# Patient Record
Sex: FEMALE | Race: WHITE | NOT HISPANIC OR LATINO | Employment: UNEMPLOYED | URBAN - METROPOLITAN AREA
[De-identification: names, ages, dates, MRNs, and addresses within clinical notes are randomized per-mention and may not be internally consistent; named-entity substitution may affect disease eponyms.]

---

## 2017-02-10 ENCOUNTER — NON-PROVIDER VISIT (OUTPATIENT)
Dept: OCCUPATIONAL MEDICINE | Facility: CLINIC | Age: 47
End: 2017-02-10

## 2017-02-10 DIAGNOSIS — Z02.1 PRE-EMPLOYMENT DRUG SCREENING: ICD-10-CM

## 2017-02-10 LAB
AMP AMPHETAMINE: NORMAL
COC COCAINE: NORMAL
INT CON NEG: NORMAL
INT CON POS: NORMAL
MET METHAMPHETAMINES: NORMAL
OPI OPIATES: NORMAL
PCP PHENCYCLIDINE: NORMAL
POC DRUG COMMENT 753798-OCCUPATIONAL HEALTH: NORMAL
THC: NORMAL

## 2017-02-10 PROCEDURE — 80305 DRUG TEST PRSMV DIR OPT OBS: CPT | Performed by: PREVENTIVE MEDICINE

## 2017-02-12 ENCOUNTER — HOSPITAL ENCOUNTER (EMERGENCY)
Facility: MEDICAL CENTER | Age: 47
End: 2017-02-12
Attending: EMERGENCY MEDICINE
Payer: COMMERCIAL

## 2017-02-12 VITALS
HEIGHT: 64 IN | SYSTOLIC BLOOD PRESSURE: 131 MMHG | WEIGHT: 159.61 LBS | DIASTOLIC BLOOD PRESSURE: 99 MMHG | BODY MASS INDEX: 27.25 KG/M2 | RESPIRATION RATE: 17 BRPM | HEART RATE: 88 BPM | OXYGEN SATURATION: 96 % | TEMPERATURE: 96.4 F

## 2017-02-12 DIAGNOSIS — K08.89 PAIN, DENTAL: ICD-10-CM

## 2017-02-12 PROCEDURE — A9270 NON-COVERED ITEM OR SERVICE: HCPCS | Performed by: EMERGENCY MEDICINE

## 2017-02-12 PROCEDURE — 700102 HCHG RX REV CODE 250 W/ 637 OVERRIDE(OP): Performed by: EMERGENCY MEDICINE

## 2017-02-12 PROCEDURE — 99283 EMERGENCY DEPT VISIT LOW MDM: CPT

## 2017-02-12 RX ORDER — IBUPROFEN 600 MG/1
600 TABLET ORAL ONCE
Status: COMPLETED | OUTPATIENT
Start: 2017-02-12 | End: 2017-02-12

## 2017-02-12 RX ORDER — TRAMADOL HYDROCHLORIDE 50 MG/1
50 TABLET ORAL EVERY 6 HOURS PRN
Qty: 10 TAB | Refills: 0 | Status: ON HOLD | OUTPATIENT
Start: 2017-02-12 | End: 2017-04-06

## 2017-02-12 RX ORDER — PENICILLIN V POTASSIUM 500 MG/1
500 TABLET ORAL 4 TIMES DAILY
Qty: 40 TAB | Refills: 0 | Status: SHIPPED | OUTPATIENT
Start: 2017-02-12 | End: 2020-05-03

## 2017-02-12 RX ADMIN — IBUPROFEN 600 MG: 600 TABLET, FILM COATED ORAL at 12:18

## 2017-02-12 ASSESSMENT — LIFESTYLE VARIABLES: DO YOU DRINK ALCOHOL: NO

## 2017-02-12 ASSESSMENT — PAIN SCALES - GENERAL: PAINLEVEL_OUTOF10: 10

## 2017-02-12 NOTE — ED AVS SNAPSHOT
ClrTouch Access Code: [email or web address]  Expires: 3/12/2017 12:41 PM    ClrTouch  A secure, online tool to manage your health information     [employer]’s ClrTouch® is a secure, online tool that connects you to your personalized health information from the privacy of your home -- day or night - making it very easy for you to manage your healthcare. Once the activation process is completed, you can even access your medical information using the ClrTouch radha, which is available for free in the Apple Radha store or Google Play store.     ClrTouch provides the following levels of access (as shown below):   My Chart Features   [facility] Primary Care Doctor [facility]  Specialists [facility]  Urgent  Care Non-[facility]  Primary Care  Doctor   Email your healthcare team securely and privately 24/7 X X X X   Manage appointments: schedule your next appointment; view details of past/upcoming appointments X      Request prescription refills. X      View recent personal medical records, including lab and immunizations X X X X   View health record, including health history, allergies, medications X X X X   Read reports about your outpatient visits, procedures, consult and ER notes X X X X   See your discharge summary, which is a recap of your hospital and/or ER visit that includes your diagnosis, lab results, and care plan. X X       How to register for ClrTouch:  1. Go to  [email or web address].  2. Click on the Sign Up Now box, which takes you to the New Member Sign Up page. You will need to provide the following information:  a. Enter your ClrTouch Access Code exactly as it appears at the top of this page. (You will not need to use this code after you’ve completed the sign-up process. If you do not sign up before the expiration date, you must request a new code.)   b. Enter your date of birth.   c. Enter your home email address.   d. Click Submit, and follow the next screen’s instructions.  3. Create a ClrTouch ID. This will be your ClrTouch  login ID and cannot be changed, so think of one that is secure and easy to remember.  4. Create a Ret[employer] password. You can change your password at any time.  5. Enter your Password Reset Question and Answer. This can be used at a later time if you forget your password.   6. Enter your e-mail address. This allows you to receive e-mail notifications when new information is available in RetentionGrid.  7. Click Sign Up. You can now view your health information.    For assistance activating your RetentionGrid account, call [phone]

## 2017-02-12 NOTE — ED AVS SNAPSHOT
Home Care Instructions                                                                                                                Kaela Rhodes   MRN: [id number]    Department:  [facility], Emergency Dept   Date of Visit:  2/12/2017            [facility], Emergency Dept    [address]    [place] [address]    Phone:  [phone]      You were seen by     Alex Adrian D.O.      Your Diagnosis Was     Pain, dental     K08.89       These are the medications you received during your hospitalization from 02/12/2017 1057 to 02/12/2017 1221     Date/Time Order Dose Route Action    02/12/2017 1218 ibuprofen (MOTRIN) tablet 600 mg 600 mg Oral Given      Follow-up Information     1. Follow up with [facility].    Contact information    [facility]  [place] [id number] [phone]        Medication Information     Review all of your home medications and newly ordered medications with your primary doctor and/or pharmacist as soon as possible. Follow medication instructions as directed by your doctor and/or pharmacist.     Please keep your complete medication list with you and share with your physician. Update the information when medications are discontinued, doses are changed, or new medications (including over-the-counter products) are added; and carry medication information at all times in the event of emergency situations.               Medication List      START taking these medications        Instructions    penicillin v potassium 500 MG Tabs   Commonly known as:  VEETID    Take 1 Tab by mouth 4 times a day.   Dose:  500 mg         ASK your doctor about these medications        Instructions    dicyclomine 20 MG Tabs   Commonly known as:  BENTYL    Take 1 Tab by mouth every 6 hours.   Dose:  20 mg       * ibuprofen 200 MG Tabs   Commonly known as:  MOTRIN    Take 800 mg by mouth 2 Times a Day.   Dose:  800 mg       * ibuprofen 600 MG Tabs   Commonly known  as:  MOTRIN    Take 1 Tab by mouth every 6 hours as needed.   Dose:  600 mg       * ibuprofen 600 MG Tabs   Commonly known as:  MOTRIN    Take 1 Tab by mouth 3 times a day, with meals.   Dose:  600 mg       omeprazole 20 MG delayed-release capsule   Commonly known as:  PRILOSEC    Take 1 Cap by mouth every day.   Dose:  20 mg       ondansetron 4 MG Tbdp   Commonly known as:  ZOFRAN ODT    Take 1 Tab by mouth every 8 hours as needed for Nausea/Vomiting.   Dose:  4 mg       * tramadol 50 MG Tabs   What changed:  Another medication with the same name was added. Make sure you understand how and when to take each.   Commonly known as:  ULTRAM   Ask about: Which instructions should I use?    Take 1 Tab by mouth every 6 hours as needed for Moderate Pain.   Dose:  50 mg       * tramadol 50 MG Tabs   What changed:  You were already taking a medication with the same name, and this prescription was added. Make sure you understand how and when to take each.   Commonly known as:  ULTRAM   Ask about: Which instructions should I use?    Take 1 Tab by mouth every 6 hours as needed.   Dose:  50 mg       * Notice:  This list has 5 medication(s) that are the same as other medications prescribed for you. Read the directions carefully, and ask your doctor or other care provider to review them with you.            Patient Information     Patient Information    Following emergency treatment: all patient requiring follow-up care must return either to a private physician or a clinic if your condition worsens before you are able to obtain further medical attention, please return to the emergency room.     Billing Information    At [facility], we work to make the billing process streamlined for our patients.  Our Representatives are here to answer any questions you may have regarding your hospital bill.  If you have insurance coverage and have supplied your insurance information to us, we will submit a claim to your insurer on your behalf.   Should you have any questions regarding your bill, we can be reached online or by phone as follows:  Online: You are able pay your bills online or live chat with our representatives about any billing questions you may have. We are here to help Monday - Friday from 8:00am to 7:30pm and 9:00am - 12:00pm on Saturdays.  Please visit [email or web address]  for more information.   Phone:  [phone] or [phone]    Please note that your emergency physician, surgeon, pathologist, radiologist, anesthesiologist, and other specialists are not employed by [facility] and will therefore bill separately for their services.  Please contact them directly for any questions concerning their bills at the numbers below:     Emergency Physician Services:  [phone]  [facility]:  [phone]  [facility]:  [phone]  [facility]:  [phone]    1. Your final bill may vary from the amount quoted upon discharge if all procedures are not complete at that time, or if your doctor has additional procedures of which we are not aware. You will receive an additional bill if you return to the Emergency Department at [facility] for suture removal regardless of the facility of which the sutures were placed.     2. Please arrange for settlement of this account at the emergency registration.    3. All self-pay accounts are due in full at the time of treatment.  If you are unable to meet this obligation then payment is expected within 4-5 days.     4. If you have had radiology studies (CT, X-ray, Ultrasound, MRI), you have received a preliminary result during your emergency department visit. Please contact the radiology department [phone] to receive a copy of your final result. Please discuss the Final result with your primary physician or with the follow up physician provided.     Crisis Hotline:  National Crisis Hotline:  3-859-GEAKNKL or  1-782.540.4880  [place] Crisis Hotline:    1-125.157.5621 or 928-301-8257         ED Discharge Follow Up Questions    1. In order to provide you with very good care, we would like to follow up with a phone call in the next few days.  May we have your permission to contact you?     YES /  NO    2. What is the best phone number to call you? (       )_____-__________    3. What is the best time to call you?      Morning  /  Afternoon  /  Evening                   Patient Signature:  ____________________________________________________________    Date:  ____________________________________________________________

## 2017-02-12 NOTE — ED PROVIDER NOTES
"ED Provider Note    CHIEF COMPLAINT  Chief Complaint   Patient presents with   • Oral Swelling   • Dental Pain       HPI  Kaela Rhodes is a 46 y.o. female here for evaluation of dental pain.  The pt states that she noticed some swelling over the lower left jaw, with associated pain.  She is able to eat/drink as per the usual.  No fever, no vomiting.  She states she has a long history of dental issues.     PAST MEDICAL HISTORY   has a past medical history of Hypertension.    SOCIAL HISTORY  Social History     Social History Main Topics   • Smoking status: Current Every Day Smoker -- 1.00 packs/day     Types: Cigarettes   • Smokeless tobacco: Not on file   • Alcohol Use: Yes      Comment: occasional   • Drug Use: No   • Sexual Activity: Not on file       SURGICAL HISTORY   has past surgical history that includes other orthopedic surgery.    CURRENT MEDICATIONS  Home Medications     **Home medications have not yet been reviewed for this encounter**          ALLERGIES  No Known Allergies    REVIEW OF SYSTEMS  See HPI for further details. Review of systems as above, otherwise all other systems are negative.     PHYSICAL EXAM  VITAL SIGNS: /99 mmHg  Pulse 91  Temp(Src) 35.8 °C (96.4 °F)  Resp 17  Ht 1.626 m (5' 4\")  Wt 72.4 kg (159 lb 9.8 oz)  BMI 27.38 kg/m2  SpO2 97%    Constitutional: No distress. Well nourished.  HENT: Head is atraumatic. Oropharynx is moist.  Dental caries noted, no abscess.  No trismus.   Eyes: Conjunctivae are normal. EOMI.   Respiratory: No respiratory distress. Equal chest expansion.   Musculoskeletal: Normal range of motion. No edema.   Neurological: Alert. No focal deficits noted.    Skin: No rash. No Pallor.   Psych: Appropriate for clinical situation. Normal affect.    PROCEDURES     MEDICAL RECORD  I have reviewed patient's medical record and pertinent results are listed above.    COURSE & MEDICAL DECISION MAKING  I have reviewed any medical record information, " laboratory studies and radiographic results as noted above.    Differential diagnoses include but not limited to: dental caries vs abscess    12:17 PM  Pt is nontoxic appearing, comfortable and without trismus.  No abscess.  I will start abx, and pain rx.      narc site checked, no sig rx.     Pt will follow up with pcp for blood pressure control    This patient presents with dental pain.  At this time, I have counseled the patient/family regarding their medications, pain control, and follow up.  They will continue their medications, if any, as prescribed.  They will return immediately for any worsening symptoms and/or any other medical concerns.  They will see their doctor, or contact the doctor provided, in 1-2 days for follow up.       FINAL IMPRESSION  1. Pain, dental            Electronically signed by: Alex Adrian, 2/12/2017 12:15 PM

## 2017-02-12 NOTE — ED AVS SNAPSHOT
2/12/2017          Kaela Rhodes  [address]  [place] [address]    Dear Kaela:    [facility] wants to ensure your discharge home is safe and you or your loved ones have had all your questions answered regarding your care after you leave the hospital.    You may receive a telephone call within two days of your discharge.  This call is to make certain you understand your discharge instructions as well as ensure we provided you with the best care possible during your stay with us.     The call will only last approximately 3-5 minutes and will be done by a nurse.    Once again, we want to ensure your discharge home is safe and that you have a clear understanding of any next steps in your care.  If you have any questions or concerns, please do not hesitate to contact us, we are here for you.  Thank you for choosing [facility] for your healthcare needs.    Sincerely,    Alek Oliva    [facility]

## 2017-03-25 ENCOUNTER — APPOINTMENT (OUTPATIENT)
Dept: RADIOLOGY | Facility: MEDICAL CENTER | Age: 47
DRG: 964 | End: 2017-03-25
Attending: DENTIST
Payer: COMMERCIAL

## 2017-03-25 ENCOUNTER — APPOINTMENT (OUTPATIENT)
Dept: RADIOLOGY | Facility: MEDICAL CENTER | Age: 47
DRG: 964 | End: 2017-03-25
Attending: EMERGENCY MEDICINE
Payer: COMMERCIAL

## 2017-03-25 ENCOUNTER — RESOLUTE PROFESSIONAL BILLING HOSPITAL PROF FEE (OUTPATIENT)
Dept: HOSPITALIST | Facility: MEDICAL CENTER | Age: 47
End: 2017-03-25
Payer: COMMERCIAL

## 2017-03-25 ENCOUNTER — APPOINTMENT (OUTPATIENT)
Dept: RADIOLOGY | Facility: MEDICAL CENTER | Age: 47
DRG: 964 | End: 2017-03-25
Attending: NEUROLOGICAL SURGERY
Payer: COMMERCIAL

## 2017-03-25 ENCOUNTER — APPOINTMENT (OUTPATIENT)
Dept: RADIOLOGY | Facility: MEDICAL CENTER | Age: 47
DRG: 964 | End: 2017-03-25
Attending: SURGERY
Payer: COMMERCIAL

## 2017-03-25 ENCOUNTER — HOSPITAL ENCOUNTER (INPATIENT)
Facility: MEDICAL CENTER | Age: 47
LOS: 12 days | DRG: 964 | End: 2017-04-06
Attending: EMERGENCY MEDICINE | Admitting: SURGERY
Payer: COMMERCIAL

## 2017-03-25 DIAGNOSIS — M25.512 ACUTE PAIN OF LEFT SHOULDER: ICD-10-CM

## 2017-03-25 DIAGNOSIS — S22.42XA CLOSED FRACTURE OF MULTIPLE RIBS OF LEFT SIDE, INITIAL ENCOUNTER: ICD-10-CM

## 2017-03-25 DIAGNOSIS — S36.039A SPLENIC LACERATION, INITIAL ENCOUNTER: ICD-10-CM

## 2017-03-25 PROBLEM — Z79.01 INADEQUATE ANTICOAGULATION: Status: ACTIVE | Noted: 2017-03-25

## 2017-03-25 PROBLEM — S27.0XXA TRAUMATIC PNEUMOTHORAX: Status: ACTIVE | Noted: 2017-03-25

## 2017-03-25 PROBLEM — F10.929 ALCOHOL INTOXICATION (HCC): Status: ACTIVE | Noted: 2017-03-25

## 2017-03-25 PROBLEM — S32.009A CLOSED FRACTURE OF LUMBAR VERTEBRA (HCC): Status: ACTIVE | Noted: 2017-03-25

## 2017-03-25 PROBLEM — S02.92XA CLOSED FRACTURE OF FACIAL BONE DUE TO MOTOR VEHICLE ACCIDENT (HCC): Status: ACTIVE | Noted: 2017-03-25
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
## 2017-03-25 PROBLEM — Z51.81 INADEQUATE ANTICOAGULATION: Status: ACTIVE | Noted: 2017-03-25

## 2017-03-25 LAB
ABO GROUP BLD: NORMAL
ABO GROUP BLD: NORMAL
ALBUMIN SERPL BCP-MCNC: 3.9 G/DL (ref 3.2–4.9)
ALBUMIN/GLOB SERPL: 1.5 G/DL
ALP SERPL-CCNC: 56 U/L (ref 30–99)
ALT SERPL-CCNC: 42 U/L (ref 2–50)
ANION GAP SERPL CALC-SCNC: 8 MMOL/L (ref 0–11.9)
APTT PPP: <20 SEC (ref 24.7–36)
AST SERPL-CCNC: 49 U/L (ref 12–45)
BILIRUB SERPL-MCNC: 0.3 MG/DL (ref 0.1–1.5)
BLD GP AB SCN SERPL QL: NORMAL
BUN SERPL-MCNC: 12 MG/DL (ref 8–22)
CALCIUM SERPL-MCNC: 8.9 MG/DL (ref 8.5–10.5)
CHLORIDE SERPL-SCNC: 106 MMOL/L (ref 96–112)
CO2 SERPL-SCNC: 24 MMOL/L (ref 20–33)
CREAT SERPL-MCNC: 0.7 MG/DL (ref 0.5–1.4)
ERYTHROCYTE [DISTWIDTH] IN BLOOD BY AUTOMATED COUNT: 44 FL (ref 35.9–50)
ETHANOL BLD-MCNC: 0.21 G/DL
GFR SERPL CREATININE-BSD FRML MDRD: >60 ML/MIN/1.73 M 2
GLOBULIN SER CALC-MCNC: 2.6 G/DL (ref 1.9–3.5)
GLUCOSE SERPL-MCNC: 131 MG/DL (ref 65–99)
HCG SERPL QL: NEGATIVE
HCT VFR BLD AUTO: 43.1 % (ref 37–47)
HGB BLD-MCNC: 13 G/DL (ref 12–16)
HGB BLD-MCNC: 14 G/DL (ref 12–16)
HGB BLD-MCNC: 14.2 G/DL (ref 12–16)
HGB BLD-MCNC: 14.3 G/DL (ref 12–16)
HGB BLD-MCNC: 15.4 G/DL (ref 12–16)
INR PPP: 0.93 (ref 0.87–1.13)
MCH RBC QN AUTO: 31 PG (ref 27–33)
MCHC RBC AUTO-ENTMCNC: 32.9 G/DL (ref 33.6–35)
MCV RBC AUTO: 94.1 FL (ref 81.4–97.8)
PLATELET # BLD AUTO: 247 K/UL (ref 164–446)
PMV BLD AUTO: 9.9 FL (ref 9–12.9)
POTASSIUM SERPL-SCNC: 3.6 MMOL/L (ref 3.6–5.5)
PROT SERPL-MCNC: 6.5 G/DL (ref 6–8.2)
PROTHROMBIN TIME: 12.8 SEC (ref 12–14.6)
RBC # BLD AUTO: 4.58 M/UL (ref 4.2–5.4)
RH BLD: NORMAL
SODIUM SERPL-SCNC: 138 MMOL/L (ref 135–145)
WBC # BLD AUTO: 13.3 K/UL (ref 4.8–10.8)

## 2017-03-25 PROCEDURE — 85610 PROTHROMBIN TIME: CPT

## 2017-03-25 PROCEDURE — 94668 MNPJ CHEST WALL SBSQ: CPT

## 2017-03-25 PROCEDURE — 80307 DRUG TEST PRSMV CHEM ANLYZR: CPT

## 2017-03-25 PROCEDURE — 70450 CT HEAD/BRAIN W/O DYE: CPT

## 2017-03-25 PROCEDURE — 85018 HEMOGLOBIN: CPT

## 2017-03-25 PROCEDURE — 80053 COMPREHEN METABOLIC PANEL: CPT

## 2017-03-25 PROCEDURE — 86850 RBC ANTIBODY SCREEN: CPT

## 2017-03-25 PROCEDURE — 96375 TX/PRO/DX INJ NEW DRUG ADDON: CPT

## 2017-03-25 PROCEDURE — 99291 CRITICAL CARE FIRST HOUR: CPT | Performed by: SURGERY

## 2017-03-25 PROCEDURE — 96376 TX/PRO/DX INJ SAME DRUG ADON: CPT

## 2017-03-25 PROCEDURE — 71010 DX-CHEST-PORTABLE (1 VIEW): CPT

## 2017-03-25 PROCEDURE — 96374 THER/PROPH/DIAG INJ IV PUSH: CPT

## 2017-03-25 PROCEDURE — 86901 BLOOD TYPING SEROLOGIC RH(D): CPT

## 2017-03-25 PROCEDURE — 85730 THROMBOPLASTIN TIME PARTIAL: CPT

## 2017-03-25 PROCEDURE — 770022 HCHG ROOM/CARE - ICU (200)

## 2017-03-25 PROCEDURE — 700111 HCHG RX REV CODE 636 W/ 250 OVERRIDE (IP)

## 2017-03-25 PROCEDURE — 99291 CRITICAL CARE FIRST HOUR: CPT

## 2017-03-25 PROCEDURE — 94667 MNPJ CHEST WALL 1ST: CPT

## 2017-03-25 PROCEDURE — 72125 CT NECK SPINE W/O DYE: CPT

## 2017-03-25 PROCEDURE — 86900 BLOOD TYPING SEROLOGIC ABO: CPT

## 2017-03-25 PROCEDURE — 72146 MRI CHEST SPINE W/O DYE: CPT

## 2017-03-25 PROCEDURE — 71260 CT THORAX DX C+: CPT

## 2017-03-25 PROCEDURE — 700101 HCHG RX REV CODE 250: Performed by: SURGERY

## 2017-03-25 PROCEDURE — 72131 CT LUMBAR SPINE W/O DYE: CPT

## 2017-03-25 PROCEDURE — 51798 US URINE CAPACITY MEASURE: CPT

## 2017-03-25 PROCEDURE — 84703 CHORIONIC GONADOTROPIN ASSAY: CPT

## 2017-03-25 PROCEDURE — 85027 COMPLETE CBC AUTOMATED: CPT

## 2017-03-25 PROCEDURE — 70486 CT MAXILLOFACIAL W/O DYE: CPT

## 2017-03-25 PROCEDURE — 700111 HCHG RX REV CODE 636 W/ 250 OVERRIDE (IP): Performed by: SURGERY

## 2017-03-25 PROCEDURE — 700105 HCHG RX REV CODE 258: Performed by: SURGERY

## 2017-03-25 PROCEDURE — 72128 CT CHEST SPINE W/O DYE: CPT

## 2017-03-25 PROCEDURE — G0390 TRAUMA RESPONS W/HOSP CRITI: HCPCS

## 2017-03-25 RX ORDER — FAMOTIDINE 20 MG/1
20 TABLET, FILM COATED ORAL 2 TIMES DAILY
Status: DISCONTINUED | OUTPATIENT
Start: 2017-03-25 | End: 2017-04-06 | Stop reason: HOSPADM

## 2017-03-25 RX ORDER — CHLORHEXIDINE GLUCONATE ORAL RINSE 1.2 MG/ML
15 SOLUTION DENTAL EVERY 12 HOURS
Status: DISCONTINUED | OUTPATIENT
Start: 2017-03-25 | End: 2017-03-25

## 2017-03-25 RX ORDER — ONDANSETRON 2 MG/ML
4 INJECTION INTRAMUSCULAR; INTRAVENOUS EVERY 4 HOURS PRN
Status: DISCONTINUED | OUTPATIENT
Start: 2017-03-25 | End: 2017-04-06 | Stop reason: HOSPADM

## 2017-03-25 RX ORDER — MORPHINE SULFATE 4 MG/ML
2-4 INJECTION, SOLUTION INTRAMUSCULAR; INTRAVENOUS
Status: DISCONTINUED | OUTPATIENT
Start: 2017-03-25 | End: 2017-03-31

## 2017-03-25 RX ORDER — THIAMINE MONONITRATE (VIT B1) 100 MG
100 TABLET ORAL DAILY
Status: DISCONTINUED | OUTPATIENT
Start: 2017-03-26 | End: 2017-03-29

## 2017-03-25 RX ORDER — ENEMA 19; 7 G/133ML; G/133ML
1 ENEMA RECTAL
Status: DISCONTINUED | OUTPATIENT
Start: 2017-03-25 | End: 2017-04-06 | Stop reason: HOSPADM

## 2017-03-25 RX ORDER — LORAZEPAM 2 MG/ML
3-4 INJECTION INTRAMUSCULAR
Status: DISCONTINUED | OUTPATIENT
Start: 2017-03-25 | End: 2017-03-31

## 2017-03-25 RX ORDER — DOCUSATE SODIUM 100 MG/1
100 CAPSULE, LIQUID FILLED ORAL 2 TIMES DAILY
Status: DISCONTINUED | OUTPATIENT
Start: 2017-03-25 | End: 2017-04-06 | Stop reason: HOSPADM

## 2017-03-25 RX ORDER — BISACODYL 10 MG
10 SUPPOSITORY, RECTAL RECTAL
Status: DISCONTINUED | OUTPATIENT
Start: 2017-03-25 | End: 2017-04-06 | Stop reason: HOSPADM

## 2017-03-25 RX ORDER — LORAZEPAM 2 MG/ML
1-2 INJECTION INTRAMUSCULAR
Status: DISCONTINUED | OUTPATIENT
Start: 2017-03-25 | End: 2017-03-31

## 2017-03-25 RX ORDER — FOLIC ACID 1 MG/1
1 TABLET ORAL DAILY
Status: DISCONTINUED | OUTPATIENT
Start: 2017-03-26 | End: 2017-03-29

## 2017-03-25 RX ORDER — SODIUM CHLORIDE, SODIUM LACTATE, POTASSIUM CHLORIDE, CALCIUM CHLORIDE 600; 310; 30; 20 MG/100ML; MG/100ML; MG/100ML; MG/100ML
INJECTION, SOLUTION INTRAVENOUS CONTINUOUS
Status: DISCONTINUED | OUTPATIENT
Start: 2017-03-25 | End: 2017-03-28

## 2017-03-25 RX ORDER — AMOXICILLIN 250 MG
1 CAPSULE ORAL NIGHTLY
Status: DISCONTINUED | OUTPATIENT
Start: 2017-03-25 | End: 2017-04-06 | Stop reason: HOSPADM

## 2017-03-25 RX ORDER — AMOXICILLIN 250 MG
1 CAPSULE ORAL
Status: DISCONTINUED | OUTPATIENT
Start: 2017-03-25 | End: 2017-04-06 | Stop reason: HOSPADM

## 2017-03-25 RX ORDER — POLYETHYLENE GLYCOL 3350 17 G/17G
1 POWDER, FOR SOLUTION ORAL 2 TIMES DAILY
Status: DISCONTINUED | OUTPATIENT
Start: 2017-03-25 | End: 2017-04-06 | Stop reason: HOSPADM

## 2017-03-25 RX ADMIN — MORPHINE SULFATE 4 MG: 4 INJECTION INTRAVENOUS at 12:00

## 2017-03-25 RX ADMIN — MORPHINE SULFATE 4 MG: 4 INJECTION INTRAVENOUS at 04:12

## 2017-03-25 RX ADMIN — FOLIC ACID: 5 INJECTION, SOLUTION INTRAMUSCULAR; INTRAVENOUS; SUBCUTANEOUS at 10:00

## 2017-03-25 RX ADMIN — LORAZEPAM 2 MG: 2 INJECTION INTRAMUSCULAR at 13:04

## 2017-03-25 RX ADMIN — HYDROMORPHONE HYDROCHLORIDE 0.5 MG: 1 INJECTION, SOLUTION INTRAMUSCULAR; INTRAVENOUS; SUBCUTANEOUS at 03:32

## 2017-03-25 RX ADMIN — FENTANYL CITRATE 100 MCG: 50 INJECTION, SOLUTION INTRAMUSCULAR; INTRAVENOUS at 02:03

## 2017-03-25 RX ADMIN — HYDROMORPHONE HYDROCHLORIDE 0.5 MG: 1 INJECTION, SOLUTION INTRAMUSCULAR; INTRAVENOUS; SUBCUTANEOUS at 02:35

## 2017-03-25 RX ADMIN — Medication 0.5 MG: at 02:35

## 2017-03-25 RX ADMIN — MORPHINE SULFATE 4 MG: 4 INJECTION INTRAVENOUS at 07:45

## 2017-03-25 RX ADMIN — LORAZEPAM 4 MG: 2 INJECTION INTRAMUSCULAR at 17:00

## 2017-03-25 RX ADMIN — MORPHINE SULFATE 4 MG: 4 INJECTION INTRAVENOUS at 15:20

## 2017-03-25 RX ADMIN — MORPHINE SULFATE 4 MG: 4 INJECTION INTRAVENOUS at 13:04

## 2017-03-25 RX ADMIN — MORPHINE SULFATE 4 MG: 4 INJECTION INTRAVENOUS at 09:56

## 2017-03-25 RX ADMIN — LORAZEPAM 2 MG: 2 INJECTION INTRAMUSCULAR at 08:28

## 2017-03-25 RX ADMIN — Medication 0.5 MG: at 03:32

## 2017-03-25 RX ADMIN — SODIUM CHLORIDE, POTASSIUM CHLORIDE, SODIUM LACTATE AND CALCIUM CHLORIDE: 600; 310; 30; 20 INJECTION, SOLUTION INTRAVENOUS at 04:16

## 2017-03-25 RX ADMIN — LORAZEPAM 2 MG: 2 INJECTION INTRAMUSCULAR at 21:20

## 2017-03-25 RX ADMIN — FAMOTIDINE 20 MG: 10 INJECTION INTRAVENOUS at 21:15

## 2017-03-25 RX ADMIN — MORPHINE SULFATE 4 MG: 4 INJECTION INTRAVENOUS at 17:00

## 2017-03-25 RX ADMIN — LORAZEPAM 2 MG: 2 INJECTION INTRAMUSCULAR at 04:30

## 2017-03-25 ASSESSMENT — PAIN SCALES - GENERAL
PAINLEVEL_OUTOF10: 5
PAINLEVEL_OUTOF10: 10
PAINLEVEL_OUTOF10: 8
PAINLEVEL_OUTOF10: 6
PAINLEVEL_OUTOF10: 6
PAINLEVEL_OUTOF10: 10
PAINLEVEL_OUTOF10: 9
PAINLEVEL_OUTOF10: 10
PAINLEVEL_OUTOF10: 6
PAINLEVEL_OUTOF10: 10
PAINLEVEL_OUTOF10: 6
PAINLEVEL_OUTOF10: 10
PAINLEVEL_OUTOF10: 5
PAINLEVEL_OUTOF10: 4
PAINLEVEL_OUTOF10: 7

## 2017-03-25 ASSESSMENT — LIFESTYLE VARIABLES
HAVE YOU EVER FELT YOU SHOULD CUT DOWN ON YOUR DRINKING: NO
TOTAL SCORE: 0
ON A TYPICAL DAY WHEN YOU DRINK ALCOHOL HOW MANY DRINKS DO YOU HAVE: 0
DO YOU DRINK ALCOHOL: YES
EVER FELT BAD OR GUILTY ABOUT YOUR DRINKING: NO
EVER_SMOKED: YES
TOTAL SCORE: 0
HOW MANY TIMES IN THE PAST YEAR HAVE YOU HAD 5 OR MORE DRINKS IN A DAY: 5
TOTAL SCORE: 0
AVERAGE NUMBER OF DAYS PER WEEK YOU HAVE A DRINK CONTAINING ALCOHOL: 2
EVER HAD A DRINK FIRST THING IN THE MORNING TO STEADY YOUR NERVES TO GET RID OF A HANGOVER: NO
HAVE PEOPLE ANNOYED YOU BY CRITICIZING YOUR DRINKING: NO
CONSUMPTION TOTAL: POSITIVE

## 2017-03-25 ASSESSMENT — COPD QUESTIONNAIRES
DURING THE PAST 4 WEEKS HOW MUCH DID YOU FEEL SHORT OF BREATH: SOME OF THE TIME
COPD SCREENING SCORE: 4
DO YOU EVER COUGH UP ANY MUCUS OR PHLEGM?: NO/ONLY WITH OCCASIONAL COLDS OR INFECTIONS
HAVE YOU SMOKED AT LEAST 100 CIGARETTES IN YOUR ENTIRE LIFE: YES
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
## 2017-03-25 NOTE — ED NOTES
Pt return from CT. Pt screaming/crying in pain. ERP made aware new orders received. PT resp shallow, painful per pt. Pt c/o L shoulder pain, positive swelling and deformity.

## 2017-03-25 NOTE — PROGRESS NOTES
Pt arrived to unit with RN and CCT.  Pt crying out through out transfer.  Arrived in unit approx 0405.  Morphine given for pain.  Pt does not tolerate being moved.  Blood assessed on L side of head.  Pt has piercing in genital area and tongue.  C/o pain in back, L side and L ankle.
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
## 2017-03-25 NOTE — IP AVS SNAPSHOT
4/6/2017          Kaela Rhodes  [address]  [place] [address]    Dear Kaela:    [facility] wants to ensure your discharge home is safe and you or your loved ones have had all your questions answered regarding your care after you leave the hospital.    You may receive a telephone call within two days of your discharge.  This call is to make certain you understand your discharge instructions as well as ensure we provided you with the best care possible during your stay with us.     The call will only last approximately 3-5 minutes and will be done by a nurse.    Once again, we want to ensure your discharge home is safe and that you have a clear understanding of any next steps in your care.  If you have any questions or concerns, please do not hesitate to contact us, we are here for you.  Thank you for choosing [facility] for your healthcare needs.    Sincerely,    Alek Oliva    [facility]
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
## 2017-03-25 NOTE — IP AVS SNAPSHOT
Wynlink Access Code: [email or web address]  Expires: 5/6/2017  4:18 PM    Wynlink  A secure, online tool to manage your health information     [employer]’s Wynlink® is a secure, online tool that connects you to your personalized health information from the privacy of your home -- day or night - making it very easy for you to manage your healthcare. Once the activation process is completed, you can even access your medical information using the Wynlink radha, which is available for free in the Apple Radha store or Google Play store.     Wynlink provides the following levels of access (as shown below):   My Chart Features   [facility] Primary Care Doctor [facility]  Specialists [facility]  Urgent  Care Non-[facility]  Primary Care  Doctor   Email your healthcare team securely and privately 24/7 X X X X   Manage appointments: schedule your next appointment; view details of past/upcoming appointments X      Request prescription refills. X      View recent personal medical records, including lab and immunizations X X X X   View health record, including health history, allergies, medications X X X X   Read reports about your outpatient visits, procedures, consult and ER notes X X X X   See your discharge summary, which is a recap of your hospital and/or ER visit that includes your diagnosis, lab results, and care plan. X X       How to register for Wynlink:  1. Go to  [email or web address].  2. Click on the Sign Up Now box, which takes you to the New Member Sign Up page. You will need to provide the following information:  a. Enter your Wynlink Access Code exactly as it appears at the top of this page. (You will not need to use this code after you’ve completed the sign-up process. If you do not sign up before the expiration date, you must request a new code.)   b. Enter your date of birth.   c. Enter your home email address.   d. Click Submit, and follow the next screen’s instructions.  3. Create a Wynlink ID. This will be your Wynlink  login ID and cannot be changed, so think of one that is secure and easy to remember.  4. Create a [employer] password. You can change your password at any time.  5. Enter your Password Reset Question and Answer. This can be used at a later time if you forget your password.   6. Enter your e-mail address. This allows you to receive e-mail notifications when new information is available in MeshApp.  7. Click Sign Up. You can now view your health information.    For assistance activating your MeshApp account, call [phone]

## 2017-03-25 NOTE — IP AVS SNAPSHOT
" <p align=\"LEFT\"><IMG SRC=\"//[email or web address]\" alt=\"Image\" WIDTH=\"50%\" HEIGHT=\"200\" BORDER=\"\"></p>                   Name:Kaela Rhodes  Medical Record Number:[id number]  CSN: [id number]    YOB: 1970   Age: 46 y.o.  Sex: female  HT:1.626 m (5' 4\") WT: 74 kg (163 lb 2.3 oz)          Admit Date: 3/25/2017     Discharge Date:   Today's Date: 4/6/2017  Attending Doctor:  Jolly Hernandez M.D.                  Allergies:  Review of patient's allergies indicates no known allergies.          Follow-up Information     1. Follow up with Rodolfo Virk M.D.. Go on 4/10/2017.    Specialty:  Family Medicine    Why:  PLEASE ARRIVE AT 8:30AM FOR A 9:00AM APPOINTMENT. BRING PHOTO ID, 2 MONTHS OF PAY STUBS, PROOF OF ADDRESS AND ALL MEDICATIONS. THANK YOU    Contact information    [address]  [address]  [place] [address]  [phone]          2. Follow up with Damián Felipe M.D.. Schedule an appointment as soon as possible for a visit on 5/20/2017.    Specialty:  Neurosurgery    Why:  follow-up MRI for spinal injury    Contact information    [address]  [place] [address]  [phone]          3. Follow up with Jolly Hernandez M.D.. Schedule an appointment as soon as possible for a visit in 1 week.    Specialty:  Surgery    Contact information    [address]  [address]  [place] [address] [phone]          4. Schedule an appointment as soon as possible for a visit with Blayne Wilhelm D.M.D.,MDOTTIE.    Specialty:  Oral Surgery    Why:  for facial fracture re-evaluation, As needed    Contact information    [address]  [place] [address]  [phone]          5. Follow up with Haim Hernandez M.D.. Schedule an appointment as soon as possible for a visit in 2 weeks.    Specialty:  Ophthalmology    Why:  for follow-up eye appointment    Contact information    [address] [place] [address] [id number] [phone]           Medication List      Take these Medications        Instructions   " dicyclomine 20 MG Tabs   Commonly known as:  BENTYL    Take 1 Tab by mouth every 6 hours.   Dose:  20 mg        MG Caps    Doctor's comments:  While on narcotic pain meds.   Take 100 mg by mouth 2 times a day.   Dose:  100 mg       omeprazole 20 MG delayed-release capsule   Commonly known as:  PRILOSEC    Take 1 Cap by mouth every day.   Dose:  20 mg       ondansetron 4 MG Tbdp   Commonly known as:  ZOFRAN ODT    Take 1 Tab by mouth every 8 hours as needed for Nausea/Vomiting.   Dose:  4 mg       oxycodone immediate release 10 MG immediate release tablet   Commonly known as:  ROXICODONE    Take 0.5-1 Tabs by mouth every 3 hours as needed for Moderate Pain ((4-6)).   Dose:  5-10 mg       penicillin v potassium 500 MG Tabs   Commonly known as:  VEETID    Take 1 Tab by mouth 4 times a day.   Dose:  500 mg

## 2017-03-25 NOTE — ED NOTES
Pt back from ct scan with co pain to lt shoulder and lt rib cage, deformity noted to lt shoulder, pt vss during ct scan, resp are even and mildly labored due to pain with breathing, resp rate stayed between 16-24/min during ct scan sao2 stayed between 96-99% on oxygen at 2l/min via nc, heart rate 80-95/min, pt screams out with any movement to body, splinting left shoulder and lt rib cage during ct, primary nurse at bedside

## 2017-03-25 NOTE — PROGRESS NOTES
Reviewed scan.     Left zygoma fracture. Minimally displaced. Not much depression. More of a cosmetic deformity possible.   I will discuss with patient when she is sober.  Best to also allow swelling to go down to make a decision.  At this   Time I don't see surgery as likely.
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
## 2017-03-25 NOTE — CARE PLAN
Problem: Oxygenation:  Goal: Maintain adequate oxygenation dependent on patient condition  Outcome: PROGRESSING AS EXPECTED    Problem: Hyperinflation:  Goal: Prevent or improve atelectasis  Outcome: PROGRESSING AS EXPECTED  PT IS is 750
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
## 2017-03-25 NOTE — IP AVS SNAPSHOT
" Home Care Instructions                                                                                                                  Name:Kaela Rhodes  Medical Record Number:[id number]  CSN: [id number]    YOB: 1970   Age: 46 y.o.  Sex: female  HT:1.626 m (5' 4\") WT: 74 kg (163 lb 2.3 oz)          Admit Date: 3/25/2017     Discharge Date:   Today's Date: 4/6/2017  Attending Doctor:  Jolly Hernandez M.D.                  Allergies:  Review of patient's allergies indicates no known allergies.            Discharge Instructions       Discharge Instructions    Discharged to home by car with relative. Discharged via walking, hospital escort: Refused.  Special equipment needed: Walker    Be sure to schedule a follow-up appointment with your primary care doctor or any specialists as instructed.     Discharge Plan:   Diet Plan: Discussed  Activity Level: Discussed  Smoking Cessation Offered: Patient Refused  Confirmed Follow up Appointment: Patient to Call and Schedule Appointment  Confirmed Symptoms Management: Discussed  Medication Reconciliation Updated: Yes  Influenza Vaccine Indication: Patient Refuses    I understand that a diet low in cholesterol, fat, and sodium is recommended for good health. Unless I have been given specific instructions below for another diet, I accept this instruction as my diet prescription.   Other diet: regular    Special Instructions: Call or seek medical attention for questions or concerns   - Follow up with Dr. Hernandez in 1 weeks time   - Follow up with Dr. Wilhelm in 1 weeks time for facial fracture follow-up   - Follow up with Dr. Hernandez in 2 weeks for opthalmology follow-up   - Follow up with Dr. Felipe on 5/20 for repeat MRI of spine   - Follow up with primary care provider within one weeks time   - Resume regular diet   - Continue daily over the counter stool softener while on narcotics   - No operation of machinery or motorized vehicles while under the influence of " narcotics   - No alcohol use while under the influence of narcotics   - No swimming, hot tubs, baths or wound submersion until cleared by outpatient provider. May shower   - No contact sports, strenuous activities, or heavy lifting until cleared by outpatient provider   - If respiratory decompensation, changes in neurologic status, changes in vision, uncontrolled pain, or signs or symptoms of infection occur seek medical attention    · Is patient discharged on Warfarin / Coumadin?   No     · Is patient Post Blood Transfusion?  No    Depression / Suicide Risk    As you are discharged from this [facility] facility, it is important to learn how to keep safe from harming yourself.    Recognize the warning signs:  · Abrupt changes in personality, positive or negative- including increase in energy   · Giving away possessions  · Change in eating patterns- significant weight changes-  positive or negative  · Change in sleeping patterns- unable to sleep or sleeping all the time   · Unwillingness or inability to communicate  · Depression  · Unusual sadness, discouragement and loneliness  · Talk of wanting to die  · Neglect of personal appearance   · Rebelliousness- reckless behavior  · Withdrawal from people/activities they love  · Confusion- inability to concentrate     If you or a loved one observes any of these behaviors or has concerns about self-harm, here's what you can do:  · Talk about it- your feelings and reasons for harming yourself  · Remove any means that you might use to hurt yourself (examples: pills, rope, extension cords, firearm)  · Get professional help from the community (Mental Health, Substance Abuse, psychological counseling)  · Do not be alone:Call your Safe Contact- someone whom you trust who will be there for you.  · Call your local CRISIS HOTLINE 241-8352 or 419-126-0435  · Call your local Children's Mobile Crisis Response Team [place] (452) 843-9336 or www.Convrrt  · Call the toll  free National Suicide Prevention Hotlines   · National Suicide Prevention Lifeline 533-307-YPAQ (7732)  · [facility] 800-SUICIDE (985-3522)    Fracture Care, Generic  The 206 bones in our body are important for supporting our body (skeleton) and also for production of blood cells by the bone marrow. A fracture is a break in a tissue. A tissue is a collection of cells that performs a function or job in our body. We most commonly think of fractures in bones.  When a bone is broken, or fractured, it affects not only blood production and function. There may also be other damage when structures near the bones are injured.  There are three main types of fractures:  · Open - where there is a wound leading to the fracture site or the bone is protruding from the skin.   · Closed - where the bone has fractured but has no external wound.   · Complicated - this may involve damage to associated vital organs and major blood vessels as a result of the fracture.   Fractures are usually managed by keeping the bones in place long enough for them to heal. This is usually done with splints or casts. Sometimes surgery is required and pins, plates and screws may be used to hold fractures in proper position. The amount of time it takes a fracture to heal depends mostly on the age and health of the patient.  Young children are prone to fractures. These fractures heal rather quickly. The common fractures suffered by children tend to be associated with the arms and wrists. As young bones do not harden for some years, children's fractures tend to 'bend and splinter', similar to a broken branch on a tree. This the reason for the name, 'greenstick fracture'.  As we grow older, there may be a loss of bone known as osteopenia or osteoporosis. These conditions make breaking a bone much easier. Sometimes a minor accident or simply over-use may produce a fracture. These fractures do not heal as fast a younger person's.  SYMPTOMS  The  signs and symptoms of fractures of bones depend on how bad the injury is. If shock is present, there may be pale, cool, clammy skin with a rapid, weak pulse. There is usually pain and tenderness in the area of the fracture. There may or may not be deformity of the bone. There may be injury to surrounding tissues.  TREATMENT  · Care and treatment of fractures relies on immobilization and adequate splinting of the injury. If the fracture is complex, the wound associated with an open fracture may be difficult to handle without professional help.   · If the pulse to the end part of the limb (distal pulse) cannot be restored by gentle traction, then the limb should be stabilized in its current position. Urgent ambulance transport should be obtained. Do not waste time with splinting.   · Generally, fractured limbs should be made immobile and left for medical aid. In remote areas or some distance from medical aid, you may be required to treat as follows.   FRACTURED FOREARM  · Check for pulse at the end part of the limb. If none - gentle traction until pulse returns   · Treat any wounds   · Pad bony prominences   · Apply adequate splinting.   · Secure above and below fracture, secure wrist.   · Reassess pulse or return of color and/or warmth.   · Elevate injury with arm sling.    FRACTURED UPPER ARM  · Check for pulse at the end part of the limb, if none - gentle traction until pulse returns.   · Treat any wounds.   · Pad between arm and chest.   · Apply 'collar and cuff' sling, secure above and below fracture firmly against chest with triangular bandages.   · Reassess pulse or return of color and/or warmth.    FRACTURED LEG  · Check for circulation and pulse at the end part of the limb (skin color and temperature). If no circulation, apply gentle traction until pulse or color returns.   · Call '911' for an ambulance.   · Treat any wounds.   · Immobilize (keep it from moving) the limb.   · Pad bony prominences.    · Reassess circulation below injury.   FRACTURED PELVIS  · Call '911' for an ambulance.   · Check for pulses in both legs.   · Bend legs at knees, elevate lower legs slightly and support on pillows or something padded.   · Support both hips with folded blankets either side.   · Discourage attempts to urinate.   · Care must be exercised with a suspected fractured pelvis. This injury may have serious complications. The casualty should always be transported by ambulance and not by alternative means unless absolutely necessary.   FRACTURED JAW  · A common injury in certain contact sports is dislocation, or fracture of the lower jaw (mandible). The casualty will have pain in the jaw, be unable to speak properly, and may have trouble swallowing.   · Call '911' for an ambulance.   · Support the jaw.   · Sit the injured person leaning slightly forward.   · Rest the injured jaw on a pad held by the injured person.   · DO NOT apply a bandage to support the jaw.   · Observe the casualty carefully for signs of breathing difficulties and any indication that he or she is becoming drowsy or unconscious.   SLINGS  · Slings are used to support an injured arm, or to supplement treatment for another injury such as fractured ribs. Generally, the most effective sling is made with a triangular bandage. Every first aid kit, no matter how small, should have at least one of these bandages.   · Although triangular bandages are preferable, any material (tie, belt, or piece of thick twine or rope) can be used in an emergency. If no likely material is at hand, an injured arm can be adequately supported by inserting it inside the injured person's shirt or blouse. Similarly, a safety pin applied to a sleeve and secured to clothing on the chest may work well enough.   · There are essentially three types of sling; the arm sling for injuries to the forearm, the elevated sling for injuries to the shoulder, and the 'collar-and-cuff' or clove hitch  "for injuries to the upper arm and as supplementary support to fractured ribs.   · After application of any sling, always check the circulation to the limb by feeling for the pulse at the wrist, or by squeezing a fingernail and observing for change of color in the nail bed. All slings must be in a position that is comfortable for the injured person. Never force an arm into the 'right position'.   ARM SLING  · Support the injured forearm approximately parallel to the ground with the wrist slightly higher than the elbow.   · Place an opened triangular bandage between the body and the arm, with its apex towards the elbow.   · Extend the upper point of the bandage over the shoulder on the uninjured side.   · Bring the lower point up over the arm, across the shoulder on the injured side to join the upper point and tie firmly with a reef knot.   · Ensure the elbow is secured by folding the excess bandage over the elbow and securing with a safety pin.   ELEVATED SLING  · Support the injured arm with the elbow beside the body and the hand extended towards the uninjured shoulder.   · Place an opened triangular bandage over the forearm and hand, with the apex towards the elbow.   · Extend the upper point of the bandage over the uninjured shoulder.   · Tuck the lower part of the bandage under the injured arm, bring it under the elbow and around the back and extend the lower point up to meet the upper point at the shoulder.   · Tie firmly with a reef knot.   · Secure the elbow by folding the excess material and applying a safety pin, then ensure that the sling is tucked under the arm giving firm support.   COLLAR-AND-CUFF (CLOVE HITCH)   · Allow the elbow to hang naturally at the side and place the hand extended towards the shoulder on the uninjured side.   · Form a clove hitch by forming two loops - one towards you, one away from you.   · Put the loops together by sliding your hands under the loops and closing with a \"clapping\" " motion. If you are experienced at forming a clove hitch, then apply a clove hitch directly on the wrist, but take care not to move the injured arm.   · Slide the clove hitch over the hand and gently pull it firmly to secure the wrist.   · Extend the points of the bandage to either side of the neck and tie firmly with a reef knot.   · Allow the arm to hang comfortably. For support to fractured ribs, apply triangular bandages around the body and upper arm to hold the arm firmly against the chest.   If your caregiver has given you a follow-up appointment, it is very important to keep that appointment. This includes any orthopedic referrals, physical therapy, and rehabilitation. Any delay in obtaining necessary care could result in a delay or failure of the bones to heal. Not keeping the appointment could result in a chronic or permanent injury, pain, and disability. If there is any problem keeping the appointment, you must call back to this facility for assistance.   Document Released: 12/22/2005 Document Revised: 03/11/2013 Document Reviewed: 07/24/2009  [employer]® Patient Information ©2013 [employer].      Splenic Injury  A splenic injury is an injury of the spleen. The spleen is an organ located in the upper left area of your abdomen, just under your ribs. Your spleen filters and cleans your blood. It also stores blood cells and destroys cells that are worn out. Your spleen is also important for fighting disease.   Splenic injuries can vary. In some cases, the spleen may only be bruised with some bleeding inside the covering and around the spleen. Splenic injuries may also cause a deep tear or cut into the spleen (lacerated spleen). Some splenic injuries can cause the spleen to break open (rupture).  CAUSES   Splenic injuries can be caused by a direct blow (blunt trauma) from:  · Car accidents.  · Contact sports.  · Falls.  Gunshot wounds or knife wounds (penetrating injuries) can also cause a splenic  injury.  RISK FACTORS  You may be at greater risk for a splenic injury if you have a disease that can cause the spleen to become enlarged. These include:  · Alcoholic liver disease.  · Viral infections, especially mononucleosis.  SIGNS AND SYMPTOMS   A minor splenic injury often causes no symptoms or only minor abdominal pain. If the injury causes severe bleeding, your blood pressure may rapidly decrease. This may cause:  · Dizziness or light-headedness.  · Rapid heart rate.  · Difficulty breathing.  · Fainting.  · Sweating with clammy skin.  Other signs and symptoms of a splenic injury can include:  · Very bad abdominal pain.  · Pain in the left shoulder.  · Pain when the abdomen is pressed (tenderness).  · Nausea.  · Swelling or bruising of the abdomen.  DIAGNOSIS   Your health care provider may suspect a splenic injury based on your signs and symptoms, especially if you were recently in an accident or you recently got hurt. Your health care provider will do a physical exam. Imaging tests may be done to confirm the diagnosis. These may include:  · Ultrasound.  · CT scan.  You may have frequent blood tests for a few days after the injury to monitor your condition.   TREATMENT   Treatment depends on the type of splenic injury you have and how bad it is. Your health care provider will develop a treatment plan specific to your needs.  · Less severe injuries may be treated with:  ¨ Observation.  ¨ Interventional radiology. This involves using flexible tubes (catheters) to stop the bleeding from inside the blood vessel.    · More severe injuries may require hospitalization in the intensive care unit (ICU). While you are in the ICU:    ¨ Your fluid and blood levels will be monitored closely.  ¨ You will get fluids through an IV tube as needed.    ¨ You may need follow-up scans to check whether your spleen is able to heal itself. If the injury is getting worse, you may need surgery.  ¨ You may receive donated blood  (transfusion).  ¨ You may have a long needle inserted into your abdomen to remove any blood that has collected inside the spleen (hematoma).  · Surgery. If your blood pressure is too low, you may need emergency surgery. This may include:  ¨ Repairing a laceration.  ¨ Removing part of the spleen.  ¨ Removing the entire spleen (splenectomy).  HOME CARE INSTRUCTIONS  · Take medicines only as directed by your health care provider.  · Rest at home.  · Do not participate in any strenuous activity until your health care provider says it is safe to do so.  · Do not lift anything that is heavier than 10 lb (4.5 kg).  · Do not participate in contact sports until your health care provider says it is safe to do so.  SEEK MEDICAL CARE IF:  · You have a fever.  · You have new or increasing pain in your abdomen or in your left shoulder.  SEEK IMMEDIATE MEDICAL CARE IF:  · You have signs or symptoms of internal bleeding. Watch for:  ¨ Sweating.  ¨ Dizziness.  ¨ Weakness.  ¨ Cold and clammy skin.  ¨ Fainting.  · You have chest pain or difficulty breathing.      This information is not intended to replace advice given to you by your health care provider. Make sure you discuss any questions you have with your health care provider.     Document Released: 10/09/2007 Document Revised: 01/08/2016 Document Reviewed: 09/02/2015  [employer] Patient Education ©2016 [employer]        Follow-up Information     1. Follow up with Rodolfo Vikr M.D.. Go on 4/10/2017.    Specialty:  Family Medicine    Why:  PLEASE ARRIVE AT 8:30AM FOR A 9:00AM APPOINTMENT. BRING PHOTO ID, 2 MONTHS OF PAY STUBS, PROOF OF ADDRESS AND ALL MEDICATIONS. THANK YOU    Contact information    [address]  [address]  [facility] [address]  [phone]          2. Follow up with Damián Felipe M.D.. Schedule an appointment as soon as possible for a visit on 5/20/2017.    Specialty:  Neurosurgery    Why:  follow-up MRI for spinal injury    Contact  information    [address]  [address]  [facility] [address]  [phone]          3. Follow up with Jolly Hernandez M.D.. Schedule an appointment as soon as possible for a visit in 1 week.    Specialty:  Surgery    Contact information    [address]  [address]  [facility] [address] [phone]          4. Schedule an appointment as soon as possible for a visit with Blayne Wilhelm D.M.D.,MDOTTIE.    Specialty:  Oral Surgery    Why:  for facial fracture re-evaluation, As needed    Contact information    [address]  [facility] [address]  [phone]          5. Follow up with Haim Hernandez M.D.. Schedule an appointment as soon as possible for a visit in 2 weeks.    Specialty:  Ophthalmology    Why:  for follow-up eye appointment    Contact information    [address] [facility] [address]  [phone]           Discharge Medication Instructions:    Below are the medications your physician expects you to take upon discharge:    Review all your home medications and newly ordered medications with your doctor and/or pharmacist. Follow medication instructions as directed by your doctor and/or pharmacist.    Please keep your medication list with you and share with your physician.               Medication List      START taking these medications        Instructions     MG Caps   Last time this was given:  100 mg on 4/6/2017 10:19 AM    Doctor's comments:  While on narcotic pain meds.   Take 100 mg by mouth 2 times a day.   Dose:  100 mg       oxycodone immediate release 10 MG immediate release tablet   Last time this was given:  10 mg on 4/6/2017  1:46 PM   Commonly known as:  ROXICODONE    Take 0.5-1 Tabs by mouth every 3 hours as needed for Moderate Pain ((4-6)).   Dose:  5-10 mg         CONTINUE taking these medications        Instructions    dicyclomine 20 MG Tabs   Commonly known as:  BENTYL    Take 1 Tab by mouth every 6 hours.   Dose:  20 mg       omeprazole 20 MG delayed-release capsule   Commonly known as:   PRILOSEC    Take 1 Cap by mouth every day.   Dose:  20 mg       ondansetron 4 MG Tbdp   Commonly known as:  ZOFRAN ODT    Take 1 Tab by mouth every 8 hours as needed for Nausea/Vomiting.   Dose:  4 mg       penicillin v potassium 500 MG Tabs   Commonly known as:  VEETID    Take 1 Tab by mouth 4 times a day.   Dose:  500 mg         STOP taking these medications     ibuprofen 200 MG Tabs   Commonly known as:  MOTRIN       ibuprofen 600 MG Tabs   Commonly known as:  MOTRIN       tramadol 50 MG Tabs   Commonly known as:  ULTRAM               Instructions           Diet / Nutrition:    Follow any diet instructions given to you by your doctor or the dietician, including how much salt (sodium) you are allowed each day.    If you are overweight, talk to your doctor about a weight reduction plan.    Activity:    Remain physically active following your doctor's instructions about exercise and activity.    Rest often.     Any time you become even a little tired or short of breath, SIT DOWN and rest.    Worsening Symptoms:    Report any of the following signs and symptoms to the doctor's office immediately:    *Pain of jaw, arm, or neck  *Chest pain not relieved by medication                               *Dizziness or loss of consciousness  *Difficulty breathing even when at rest   *More tired than usual                                       *Bleeding drainage or swelling of surgical site  *Swelling of feet, ankles, legs or stomach                 *Fever (>100ºF)  *Pink or blood tinged sputum  *Weight gain (3lbs/day or 5lbs /week)           *Shock from internal defibrillator (if applicable)  *Palpitations or irregular heartbeats                *Cool and/or numb extremities    Stroke Awareness    Common Risk Factors for Stroke include:    Age  Atrial Fibrillation  Carotid Artery Stenosis  Diabetes Mellitus  Excessive alcohol consumption  High blood pressure  Overweight   Physical inactivity  Smoking    Warning signs and  symptoms of a stroke include:    *Sudden numbness or weakness of the face, arm or leg (especially on one side of the body).  *Sudden confusion, trouble speaking or understanding.  *Sudden trouble seeing in one or both eyes.  *Sudden trouble walking, dizziness, loss of balance or coordination.Sudden severe headache with no known cause.    It is very important to get treatment quickly when a stroke occurs. If you experience any of the above warning signs, call 911 immediately.                   Disclaimer         Quit Smoking / Tobacco Use:    I understand the use of any tobacco products increases my chance of suffering from future heart disease or stroke and could cause other illnesses which may shorten my life. Quitting the use of tobacco products is the single most important thing I can do to improve my health. For further information on smoking / tobacco cessation call a Toll Free Quit Line at 1-142.842.1662 (*[employer] [facility] [place]) or 1-618.948.1037 (American Lung Association) or you can access the web based program at www.lungVoxli.org.    [place] Tobacco Users Help Line:  (953) 852-2672       Toll Free: 1-101.175.1940  Quit Tobacco Program [facility] (661)038-9241    Crisis Hotline:    [place] Crisis Hotline:  0-353-PSAHBBR or 1-964.193.6417    [place] Crisis Hotline:    1-907.518.9163 or 830-861-2741    Discharge Survey:   Thank you for choosing [facility]. We hope we did everything we could to make your hospital stay a pleasant one. You may be receiving a phone survey and we would appreciate your time and participation in answering the questions. Your input is very valuable to us in our efforts to improve our service to our patients and their families.        My signature on this form indicates that:    1. I have reviewed and understand the above information.  2. My questions regarding this information have been answered to my satisfaction.  3. I have formulated a plan with my  discharge nurse to obtain my prescribed medications for home.                  Disclaimer         __________________________________                     __________       ________                       Patient Signature                                                 Date                    Time

## 2017-03-25 NOTE — CONSULTS
DATE OF SERVICE:  03/25/2017    REQUESTING PHYSICIAN:  Jolly Hernandez MD    CHIEF COMPLAINT:  T7 lesion.    HISTORY OF PRESENT ILLNESS:  The patient is a 46-year-old woman who was   intoxicated and she either fell or jumped off of the second floor balcony last   night.  She is admitted to the trauma service by Dr. Hernandez.    I was consulted as she underwent a CT scan of the thoracic spine, which shows   an atypical lesion behind the body of T7.  What is curious about the lesion is   it appears to be calcified, but there does not appear to be a fracture   through the endplates or the posterior cortex at T7.  There is no deformity of   the vertebral body of T7 or T8, the accounting could be off because I do not   have a marker on the CT scan, but regardless the lesion, which is concerning,   seems to be distinct.  It is possible it is an old calcified disk.    In the radiology report, they also talked about sclerosis and expansion of the   posterior elements at T1 and C7, which could represent a neoplastic process   that will need to be evaluated in the future with a bone scan.    PAST MEDICAL HISTORY:  Significant for hypertension.    DRUG ALLERGIES:  No known drug allergies.    PREHOSPITAL MEDICATIONS:  Include ibuprofen.    PAST SURGICAL HISTORY:  Significant for knee surgery and arm surgery.    SOCIAL HISTORY:  She drinks consistently.    REVIEW OF SYSTEMS:  Not reviewed as she is trauma and she is presently on O2   and facemask and in significant pain.    On presentation, her blood pressure is 135/90, heart rate 100.    I examined her in the trauma unit in bed S-122.    She is heading towards intubation as she has a left flail chest.    I attempted a cranial nerve exam.  Pupils are small and reactive.  Extraocular   muscles are intact.  She will not follow up balance with the cranial nerve   examination.    She moves all 4 extremities spontaneously and strongly, but she would not   follow specific motor  instructions.    Nurse indicates to me that she has been climbing up and trying to get out of   the bed and is quite strong again in about both her upper and lower extremity.    Detailed sensory exam is not obtainable because of present condition.    She is rigid and in pain and I can assess her reflexes.    Gait testing was not performed for obvious reasons.    Coagulation studies demonstrated an INR of 0.93.  She does not have a TEG   platelet mapping study order.  White count 13.3, hemoglobin and hematocrit   14/43, platelet count is 247.  Sodium is 138, potassium is 3.6.    IMAGES:  As previously described, the patient a mysterious lesion behind the   _____ disk space.  This may represent an old calcified lesion.  We are going   to order an MRI scan of the thoracic spine to better evaluate that.  The MRI   scan cannot be done presently as she is probably going to be intubated   shortly.    ASSESSMENT AND PLAN:  A 46-year-old woman in a severely intoxicated either   jump or fell from the second floor [facility] presents as a polytrauma patient,   flail chest, among other issues.    I was consulted because of a mysterious lesion on her thoracic spine at the   T7-T8 interspace if the nomenclature is correct.    When appropriate, we will obtain an MRI scan of the thoracic spine.  At this   point, there is no emergent issues as she is moving her lower extremities   well.    We will follow.       ____________________________________     MD JAMIE REYES / BARBIE    DD:  03/25/2017 10:25:37  DT:  03/25/2017 11:45:32    D#:  [id number]  Job#:  [id number]

## 2017-03-25 NOTE — ED NOTES
Pt on cart screaming in pain. ERP made aware, new orders received. PT resp shallow labored.  Pt continues to c/o pain L chest.

## 2017-03-25 NOTE — ED NOTES
Bedside report given to Sis DELGADO. Pt to floor showing no acute signs of distress. Pt remains tearful c/o L sided chest pain. Pt resp even shallow, skin pink warm dry.

## 2017-03-26 ENCOUNTER — APPOINTMENT (OUTPATIENT)
Dept: RADIOLOGY | Facility: MEDICAL CENTER | Age: 47
DRG: 964 | End: 2017-03-26
Attending: SURGERY
Payer: COMMERCIAL

## 2017-03-26 LAB
ALBUMIN SERPL BCP-MCNC: 3.3 G/DL (ref 3.2–4.9)
ALBUMIN/GLOB SERPL: 1.4 G/DL
ALP SERPL-CCNC: 49 U/L (ref 30–99)
ALT SERPL-CCNC: 32 U/L (ref 2–50)
ANION GAP SERPL CALC-SCNC: 6 MMOL/L (ref 0–11.9)
AST SERPL-CCNC: 33 U/L (ref 12–45)
BASOPHILS # BLD AUTO: 0.4 % (ref 0–1.8)
BASOPHILS # BLD: 0.04 K/UL (ref 0–0.12)
BILIRUB SERPL-MCNC: 1.1 MG/DL (ref 0.1–1.5)
BUN SERPL-MCNC: 16 MG/DL (ref 8–22)
CALCIUM SERPL-MCNC: 8.6 MG/DL (ref 8.5–10.5)
CHLORIDE SERPL-SCNC: 107 MMOL/L (ref 96–112)
CO2 SERPL-SCNC: 26 MMOL/L (ref 20–33)
CREAT SERPL-MCNC: 0.57 MG/DL (ref 0.5–1.4)
EOSINOPHIL # BLD AUTO: 0.09 K/UL (ref 0–0.51)
EOSINOPHIL NFR BLD: 0.8 % (ref 0–6.9)
ERYTHROCYTE [DISTWIDTH] IN BLOOD BY AUTOMATED COUNT: 47.3 FL (ref 35.9–50)
GFR SERPL CREATININE-BSD FRML MDRD: >60 ML/MIN/1.73 M 2
GLOBULIN SER CALC-MCNC: 2.4 G/DL (ref 1.9–3.5)
GLUCOSE SERPL-MCNC: 122 MG/DL (ref 65–99)
HCT VFR BLD AUTO: 35.8 % (ref 37–47)
HGB BLD-MCNC: 11.9 G/DL (ref 12–16)
IMM GRANULOCYTES # BLD AUTO: 0.02 K/UL (ref 0–0.11)
IMM GRANULOCYTES NFR BLD AUTO: 0.2 % (ref 0–0.9)
LYMPHOCYTES # BLD AUTO: 2.49 K/UL (ref 1–4.8)
LYMPHOCYTES NFR BLD: 21.8 % (ref 22–41)
MCH RBC QN AUTO: 32 PG (ref 27–33)
MCHC RBC AUTO-ENTMCNC: 33.2 G/DL (ref 33.6–35)
MCV RBC AUTO: 96.2 FL (ref 81.4–97.8)
MONOCYTES # BLD AUTO: 1.01 K/UL (ref 0–0.85)
MONOCYTES NFR BLD AUTO: 8.8 % (ref 0–13.4)
NEUTROPHILS # BLD AUTO: 7.77 K/UL (ref 2–7.15)
NEUTROPHILS NFR BLD: 68 % (ref 44–72)
NRBC # BLD AUTO: 0 K/UL
NRBC BLD AUTO-RTO: 0 /100 WBC
PLATELET # BLD AUTO: 189 K/UL (ref 164–446)
PMV BLD AUTO: 10.4 FL (ref 9–12.9)
POTASSIUM SERPL-SCNC: 3.8 MMOL/L (ref 3.6–5.5)
PROT SERPL-MCNC: 5.7 G/DL (ref 6–8.2)
RBC # BLD AUTO: 3.72 M/UL (ref 4.2–5.4)
SODIUM SERPL-SCNC: 139 MMOL/L (ref 135–145)
WBC # BLD AUTO: 11.4 K/UL (ref 4.8–10.8)

## 2017-03-26 PROCEDURE — 770022 HCHG ROOM/CARE - ICU (200)

## 2017-03-26 PROCEDURE — 85025 COMPLETE CBC W/AUTO DIFF WBC: CPT

## 2017-03-26 PROCEDURE — A9270 NON-COVERED ITEM OR SERVICE: HCPCS | Performed by: SURGERY

## 2017-03-26 PROCEDURE — 71010 DX-CHEST-PORTABLE (1 VIEW): CPT

## 2017-03-26 PROCEDURE — 99233 SBSQ HOSP IP/OBS HIGH 50: CPT | Performed by: SURGERY

## 2017-03-26 PROCEDURE — 80053 COMPREHEN METABOLIC PANEL: CPT

## 2017-03-26 PROCEDURE — 700111 HCHG RX REV CODE 636 W/ 250 OVERRIDE (IP): Performed by: SURGERY

## 2017-03-26 PROCEDURE — 71010 DX-CHEST-LIMITED (1 VIEW): CPT

## 2017-03-26 PROCEDURE — 700112 HCHG RX REV CODE 229: Performed by: SURGERY

## 2017-03-26 PROCEDURE — 94668 MNPJ CHEST WALL SBSQ: CPT

## 2017-03-26 PROCEDURE — 700102 HCHG RX REV CODE 250 W/ 637 OVERRIDE(OP): Performed by: SURGERY

## 2017-03-26 RX ORDER — METHADONE HYDROCHLORIDE 10 MG/1
10 TABLET ORAL EVERY 8 HOURS
Status: DISCONTINUED | OUTPATIENT
Start: 2017-03-26 | End: 2017-03-28

## 2017-03-26 RX ORDER — OXYCODONE HYDROCHLORIDE 5 MG/1
5 TABLET ORAL EVERY 4 HOURS PRN
Status: DISCONTINUED | OUTPATIENT
Start: 2017-03-26 | End: 2017-03-28

## 2017-03-26 RX ADMIN — LORAZEPAM 2 MG: 2 INJECTION INTRAMUSCULAR at 07:33

## 2017-03-26 RX ADMIN — FAMOTIDINE 20 MG: 20 TABLET, FILM COATED ORAL at 10:06

## 2017-03-26 RX ADMIN — FAMOTIDINE 20 MG: 20 TABLET, FILM COATED ORAL at 19:02

## 2017-03-26 RX ADMIN — OXYCODONE HYDROCHLORIDE 5 MG: 5 TABLET ORAL at 19:02

## 2017-03-26 RX ADMIN — MORPHINE SULFATE 4 MG: 4 INJECTION INTRAVENOUS at 03:50

## 2017-03-26 RX ADMIN — MORPHINE SULFATE 4 MG: 4 INJECTION INTRAVENOUS at 07:33

## 2017-03-26 RX ADMIN — METHADONE HYDROCHLORIDE 10 MG: 10 TABLET ORAL at 23:05

## 2017-03-26 RX ADMIN — DOCUSATE SODIUM 100 MG: 100 CAPSULE ORAL at 19:02

## 2017-03-26 RX ADMIN — THERA TABS 1 TABLET: TAB at 10:06

## 2017-03-26 RX ADMIN — OXYCODONE HYDROCHLORIDE 5 MG: 5 TABLET ORAL at 11:35

## 2017-03-26 RX ADMIN — Medication 100 MG: at 10:06

## 2017-03-26 RX ADMIN — FOLIC ACID 1 MG: 1 TABLET ORAL at 10:06

## 2017-03-26 RX ADMIN — METHADONE HYDROCHLORIDE 10 MG: 10 TABLET ORAL at 13:43

## 2017-03-26 RX ADMIN — ONDANSETRON 4 MG: 2 INJECTION, SOLUTION INTRAMUSCULAR; INTRAVENOUS at 17:29

## 2017-03-26 RX ADMIN — MORPHINE SULFATE 4 MG: 4 INJECTION INTRAVENOUS at 23:05

## 2017-03-26 RX ADMIN — LORAZEPAM 2 MG: 2 INJECTION INTRAMUSCULAR at 17:28

## 2017-03-26 RX ADMIN — METHADONE HYDROCHLORIDE 10 MG: 10 TABLET ORAL at 10:07

## 2017-03-26 RX ADMIN — MORPHINE SULFATE 4 MG: 4 INJECTION INTRAVENOUS at 17:28

## 2017-03-26 ASSESSMENT — PAIN SCALES - GENERAL
PAINLEVEL_OUTOF10: 10
PAINLEVEL_OUTOF10: 7
PAINLEVEL_OUTOF10: 10
PAINLEVEL_OUTOF10: 5
PAINLEVEL_OUTOF10: 8
PAINLEVEL_OUTOF10: 8
PAINLEVEL_OUTOF10: 5
PAINLEVEL_OUTOF10: 4
PAINLEVEL_OUTOF10: 7
PAINLEVEL_OUTOF10: 6
PAINLEVEL_OUTOF10: 4
PAINLEVEL_OUTOF10: 8
PAINLEVEL_OUTOF10: 5
PAINLEVEL_OUTOF10: 5
PAINLEVEL_OUTOF10: 6
PAINLEVEL_OUTOF10: 10
PAINLEVEL_OUTOF10: 7
PAINLEVEL_OUTOF10: 4

## 2017-03-26 NOTE — CARE PLAN
Problem: Pain Management  Goal: Pain level will decrease to patient’s comfort goal  Intervention: Follow pain managment plan developed in collaboration with patient and Interdisciplinary Team  Pt medicated for pain prn per MAR

## 2017-03-26 NOTE — PROGRESS NOTES
"Neurosurgery :   Consulted due to T7-T8 lesion found on T-spine CT.    Exam:   In a lot of pain.  Moves all 4's. Detailed motor exam is limited due to pain.      Blood pressure 135/90, pulse 94, temperature 36.7 °C (98 °F), resp. rate 16, height 1.626 m (5' 4\"), weight 72.1 kg (158 lb 15.2 oz), SpO2 96 %.    Recent Labs      03/25/17   0233   03/25/17   1535  03/25/17   2146  03/26/17   0616   WBC  13.3*   --    --    --   11.4*   RBC  4.58   --    --    --   3.72*   HEMOGLOBIN  14.2   < >  14.0  13.0  11.9*   HEMATOCRIT  43.1   --    --    --   35.8*   MCV  94.1   --    --    --   96.2   MCH  31.0   --    --    --   32.0   MCHC  32.9*   --    --    --   33.2*   RDW  44.0   --    --    --   47.3   PLATELETCT  247   --    --    --   189   MPV  9.9   --    --    --   10.4    < > = values in this interval not displayed.     Recent Labs      03/25/17   0233  03/26/17   0616   SODIUM  138  139   POTASSIUM  3.6  3.8   CHLORIDE  106  107   CO2  24  26   GLUCOSE  131*  122*   BUN  12  16     Recent Labs      03/25/17   0233   APTT  <20.0*   INR  0.93         Date 03/26/17 0700 - 03/27/17 0659   Shift 1225-5433 [id number] 24 Hour Total   I  N  T  A  K  E   I.V. 284   284      IV Volume (MIVF) 200   200      IV Volume (rally bag) 84   84    Shift Total 284   284   O  U  T  P  U  T   Shift Total          284       Intake/Output Summary (Last 24 hours) at 03/26/17 1002  Last data filed at 03/26/17 0800   Gross per 24 hour   Intake   3124 ml   Output    550 ml   Net   2574 ml         • methadone  10 mg     • oxycodone immediate-release  5 mg     • Respiratory Care per Protocol       • docusate sodium  100 mg     • senna-docusate  1 Tab     • senna-docusate  1 Tab     • polyethylene glycol/lytes  1 Packet     • magnesium hydroxide  30 mL     • bisacodyl  10 mg     • fleet  1 Each     • LR   100 mL/hr at 03/25/17 0416   • morphine injection  2-4 mg     • famotidine  20 mg      Or   • famotidine  20 mg     • " ondansetron  4 mg     • lorazepam  1-2 mg      Or   • lorazepam  3-4 mg     • thiamine  100 mg      And   • folic acid  1 mg      And   • multivitamin  1 Tab           Assessment and Plan:  PID # 1.     MRI T - spine most consistent with an old calcified disc, incidental finding from the trauma presentation.     Repeat MRI in 8 weeks F/U with me in 8 weeks.      Imaging results  [unfilled]

## 2017-03-26 NOTE — PROGRESS NOTES
"  Trauma/Surgical Progress Note    Author: Jolly Hernandez Date & Time created: 3/26/2017   11:24 AM     Interval Events:  Pain control issues. Poor pulm toilet. Hg stable. Start clears. Start to mobilize. Pain management.    Hemodynamics:  Blood pressure 135/90, pulse 94, temperature 36.7 °C (98 °F), resp. rate 16, height 1.626 m (5' 4\"), weight 72.1 kg (158 lb 15.2 oz), SpO2 96 %.     Respiratory:    Respiration: 16, Pulse Oximetry: 96 %, O2 Daily Delivery Respiratory : Silicone Nasal Cannula     PEP/CPT Method: Positive Airway Pressure Device, Work Of Breathing / Effort: Shallow  RUL Breath Sounds: Coarse Crackles, RML Breath Sounds: Coarse Crackles, RLL Breath Sounds: Diminished, LUIS M Breath Sounds: Coarse Crackles, LLL Breath Sounds: Diminished  Fluids:    Intake/Output Summary (Last 24 hours) at 03/26/17 1124  Last data filed at 03/26/17 0800   Gross per 24 hour   Intake   3124 ml   Output    550 ml   Net   2574 ml     Admit Weight: 74.844 kg (165 lb)  Current Weight: 72.1 kg (158 lb 15.2 oz)    Physical Exam   Constitutional: She appears well-developed and well-nourished.   HENT:   Head: Normocephalic.   L periorbital ecchymosis   Neck: Neck supple.   Cardiovascular: Normal rate and regular rhythm.    Pulmonary/Chest: Effort normal. She exhibits tenderness.   Abdominal: Soft. She exhibits no distension. There is tenderness.   Genitourinary:   Walden to gravity   Musculoskeletal: Normal range of motion.   Neurological: She is alert.   Skin: Skin is warm.       Medical Decision Making/Problem List:    Active Hospital Problems    Diagnosis   • Closed fracture of multiple ribs of left side [S22.42XA]     Priority: High     Left second through ninth rib fractures with flail segment of third and fourth ribs.  Blunt chest protocol. Aggressive pulmonary hygiene and pain management.       • Traumatic pneumothorax [S27.0XXA]     Priority: High     Small left anterior and lung base pneumothorax  Serial CXRs unchanged     • " Splenic laceration [S36.039A]     Priority: High     Splenic fracture involving the hilum with hazy central density suggesting active extravasation  Serial hg  Hemodynamically stable,  hg stable     • Multiple facial fractures (CMS-HCC) [S02.92XA]     Priority: Medium     Comminuted fracture of lateral wall of the left maxillary sinus. Fracture of the anterolateral wall of the left maxillary sinus extending into the inferior and lateral wall of the left orbit which is slightly displaced. Left zygomatic arch fracture.  Follow up after swelling decreases  [facility] - Facial fractures     • Closed fracture of lumbar vertebra ([facility]) [S32.009A]     Priority: Medium      Left second through sixth transverse process fractures.  Bony projection into the spinal canal at T7 resulting component of canal narrowing.  Sclerosis and slight expansion of the posterior elements of T1 and C7  MRI - T7-T8 moderate-sized ventral extradural defect corresponding to a prominent ventral extradural calcification   [facility] - Neurosurgery     • Inadequate anticoagulation [Z51.81, Z79.01]     Priority: Medium     Prophylactic Lovenox initially contraindicated due to elevated bleeding risk from spleen injury  Will need screening duplex if lovenox not started within 48 hours of admission     • Alcohol intoxication (CMS-HCC) [F10.129]     Priority: Low     Admit BA .22  Monitor for withdrawal       Core Measures & Quality Metrics:  Labs reviewed, Medications reviewed and Radiology images reviewed  Walden catheter: Critically Ill - Requiring Accurate Measurement of Urinary Output      DVT Prophylaxis: Contraindicated - High bleeding risk  DVT prophylaxis - mechanical: SCDs  Ulcer prophylaxis: Yes    Assessed for rehab: Patient unable to tolerate rehabilitation therapeutic regimen    CHELSY Score  Discussed patient condition with RN, RT, Pharmacy and Patient.  CRITICAL CARE TIME EXCLUDING PROCEDURES: 38  minutes

## 2017-03-26 NOTE — CARE PLAN
Problem: Safety  Goal: Will remain free from injury  Patient fall risk assessed and documented. All interventions in place to keep patient free from accidental injury.    Problem: Skin Integrity  Goal: Risk for impaired skin integrity will decrease  Patient skin assessed and documented. All interventions in place to improve patient skin integrity.

## 2017-03-26 NOTE — CARE PLAN
Problem: Respiratory:  Goal: Respiratory status will improve  Intervention: Educate and encourage coughing and deep breathing  Pt educated, weak effort

## 2017-03-27 ENCOUNTER — APPOINTMENT (OUTPATIENT)
Dept: RADIOLOGY | Facility: MEDICAL CENTER | Age: 47
DRG: 964 | End: 2017-03-27
Attending: SURGERY
Payer: COMMERCIAL

## 2017-03-27 LAB
ALBUMIN SERPL BCP-MCNC: 3.5 G/DL (ref 3.2–4.9)
ALBUMIN/GLOB SERPL: 1.3 G/DL
ALP SERPL-CCNC: 52 U/L (ref 30–99)
ALT SERPL-CCNC: 27 U/L (ref 2–50)
ANION GAP SERPL CALC-SCNC: 5 MMOL/L (ref 0–11.9)
AST SERPL-CCNC: 26 U/L (ref 12–45)
BASE EXCESS BLDA CALC-SCNC: 2 MMOL/L (ref -4–3)
BASOPHILS # BLD AUTO: 0.3 % (ref 0–1.8)
BASOPHILS # BLD: 0.03 K/UL (ref 0–0.12)
BILIRUB SERPL-MCNC: 1.1 MG/DL (ref 0.1–1.5)
BODY TEMPERATURE: ABNORMAL DEGREES
BUN SERPL-MCNC: 12 MG/DL (ref 8–22)
CALCIUM SERPL-MCNC: 8.8 MG/DL (ref 8.5–10.5)
CHLORIDE SERPL-SCNC: 103 MMOL/L (ref 96–112)
CO2 BLDA-SCNC: 30 MMOL/L (ref 20–33)
CO2 SERPL-SCNC: 27 MMOL/L (ref 20–33)
CREAT SERPL-MCNC: 0.57 MG/DL (ref 0.5–1.4)
EOSINOPHIL # BLD AUTO: 0.12 K/UL (ref 0–0.51)
EOSINOPHIL NFR BLD: 1.1 % (ref 0–6.9)
ERYTHROCYTE [DISTWIDTH] IN BLOOD BY AUTOMATED COUNT: 46.2 FL (ref 35.9–50)
GFR SERPL CREATININE-BSD FRML MDRD: >60 ML/MIN/1.73 M 2
GLOBULIN SER CALC-MCNC: 2.6 G/DL (ref 1.9–3.5)
GLUCOSE SERPL-MCNC: 115 MG/DL (ref 65–99)
HCO3 BLDA-SCNC: 28.6 MMOL/L (ref 17–25)
HCT VFR BLD AUTO: 37.2 % (ref 37–47)
HGB BLD-MCNC: 11.9 G/DL (ref 12–16)
IMM GRANULOCYTES # BLD AUTO: 0.03 K/UL (ref 0–0.11)
IMM GRANULOCYTES NFR BLD AUTO: 0.3 % (ref 0–0.9)
LYMPHOCYTES # BLD AUTO: 2.6 K/UL (ref 1–4.8)
LYMPHOCYTES NFR BLD: 24.8 % (ref 22–41)
MAGNESIUM SERPL-MCNC: 1.9 MG/DL (ref 1.5–2.5)
MCH RBC QN AUTO: 31.5 PG (ref 27–33)
MCHC RBC AUTO-ENTMCNC: 32 G/DL (ref 33.6–35)
MCV RBC AUTO: 98.4 FL (ref 81.4–97.8)
MONOCYTES # BLD AUTO: 0.78 K/UL (ref 0–0.85)
MONOCYTES NFR BLD AUTO: 7.4 % (ref 0–13.4)
NEUTROPHILS # BLD AUTO: 6.93 K/UL (ref 2–7.15)
NEUTROPHILS NFR BLD: 66.1 % (ref 44–72)
NRBC # BLD AUTO: 0 K/UL
NRBC BLD AUTO-RTO: 0 /100 WBC
O2/TOTAL GAS SETTING VFR VENT: 40 %
PCO2 BLDA: 53 MMHG (ref 26–37)
PCO2 TEMP ADJ BLDA: 53 MMHG (ref 26–37)
PH BLDA: 7.34 [PH] (ref 7.4–7.5)
PH TEMP ADJ BLDA: 7.34 [PH] (ref 7.4–7.5)
PHOSPHATE SERPL-MCNC: 2.8 MG/DL (ref 2.5–4.5)
PLATELET # BLD AUTO: 176 K/UL (ref 164–446)
PMV BLD AUTO: 10 FL (ref 9–12.9)
PO2 BLDA: 104 MMHG (ref 64–87)
PO2 TEMP ADJ BLDA: 104 MMHG (ref 64–87)
POTASSIUM SERPL-SCNC: 3.9 MMOL/L (ref 3.6–5.5)
PROT SERPL-MCNC: 6.1 G/DL (ref 6–8.2)
RBC # BLD AUTO: 3.78 M/UL (ref 4.2–5.4)
SAO2 % BLDA: 98 % (ref 93–99)
SODIUM SERPL-SCNC: 135 MMOL/L (ref 135–145)
SPECIMEN DRAWN FROM PATIENT: ABNORMAL
WBC # BLD AUTO: 10.5 K/UL (ref 4.8–10.8)

## 2017-03-27 PROCEDURE — 700102 HCHG RX REV CODE 250 W/ 637 OVERRIDE(OP): Performed by: SURGERY

## 2017-03-27 PROCEDURE — 700111 HCHG RX REV CODE 636 W/ 250 OVERRIDE (IP): Performed by: SURGERY

## 2017-03-27 PROCEDURE — 82803 BLOOD GASES ANY COMBINATION: CPT

## 2017-03-27 PROCEDURE — A9270 NON-COVERED ITEM OR SERVICE: HCPCS | Performed by: SURGERY

## 2017-03-27 PROCEDURE — 71010 DX-CHEST-LIMITED (1 VIEW): CPT

## 2017-03-27 PROCEDURE — 770022 HCHG ROOM/CARE - ICU (200)

## 2017-03-27 PROCEDURE — 85025 COMPLETE CBC W/AUTO DIFF WBC: CPT

## 2017-03-27 PROCEDURE — 80053 COMPREHEN METABOLIC PANEL: CPT

## 2017-03-27 PROCEDURE — 36600 WITHDRAWAL OF ARTERIAL BLOOD: CPT

## 2017-03-27 PROCEDURE — 700112 HCHG RX REV CODE 229: Performed by: SURGERY

## 2017-03-27 PROCEDURE — 84100 ASSAY OF PHOSPHORUS: CPT

## 2017-03-27 PROCEDURE — 94668 MNPJ CHEST WALL SBSQ: CPT

## 2017-03-27 PROCEDURE — 83735 ASSAY OF MAGNESIUM: CPT

## 2017-03-27 PROCEDURE — 99233 SBSQ HOSP IP/OBS HIGH 50: CPT | Performed by: SURGERY

## 2017-03-27 RX ORDER — NICOTINE 21 MG/24HR
14 PATCH, TRANSDERMAL 24 HOURS TRANSDERMAL
Status: DISCONTINUED | OUTPATIENT
Start: 2017-03-27 | End: 2017-04-06 | Stop reason: HOSPADM

## 2017-03-27 RX ADMIN — ONDANSETRON 4 MG: 2 INJECTION, SOLUTION INTRAMUSCULAR; INTRAVENOUS at 21:57

## 2017-03-27 RX ADMIN — MORPHINE SULFATE 2 MG: 4 INJECTION INTRAVENOUS at 15:26

## 2017-03-27 RX ADMIN — ENOXAPARIN SODIUM 30 MG: 100 INJECTION SUBCUTANEOUS at 13:15

## 2017-03-27 RX ADMIN — POLYETHYLENE GLYCOL 3350 1 PACKET: 17 POWDER, FOR SOLUTION ORAL at 21:29

## 2017-03-27 RX ADMIN — METHADONE HYDROCHLORIDE 10 MG: 10 TABLET ORAL at 13:16

## 2017-03-27 RX ADMIN — POLYETHYLENE GLYCOL 3350 1 PACKET: 17 POWDER, FOR SOLUTION ORAL at 08:32

## 2017-03-27 RX ADMIN — NICOTINE 14 MG: 14 PATCH TRANSDERMAL at 13:15

## 2017-03-27 RX ADMIN — FOLIC ACID 1 MG: 1 TABLET ORAL at 08:31

## 2017-03-27 RX ADMIN — STANDARDIZED SENNA CONCENTRATE AND DOCUSATE SODIUM 1 TABLET: 8.6; 5 TABLET, FILM COATED ORAL at 21:29

## 2017-03-27 RX ADMIN — MORPHINE SULFATE 2 MG: 4 INJECTION INTRAVENOUS at 08:31

## 2017-03-27 RX ADMIN — OXYCODONE HYDROCHLORIDE 5 MG: 5 TABLET ORAL at 04:37

## 2017-03-27 RX ADMIN — METHADONE HYDROCHLORIDE 10 MG: 10 TABLET ORAL at 05:14

## 2017-03-27 RX ADMIN — DOCUSATE SODIUM 100 MG: 100 CAPSULE ORAL at 21:29

## 2017-03-27 RX ADMIN — Medication 100 MG: at 08:30

## 2017-03-27 RX ADMIN — FAMOTIDINE 20 MG: 10 INJECTION INTRAVENOUS at 08:31

## 2017-03-27 RX ADMIN — LORAZEPAM 2 MG: 2 INJECTION INTRAMUSCULAR at 02:11

## 2017-03-27 RX ADMIN — MORPHINE SULFATE 2 MG: 4 INJECTION INTRAVENOUS at 21:36

## 2017-03-27 RX ADMIN — THERA TABS 1 TABLET: TAB at 08:30

## 2017-03-27 RX ADMIN — FAMOTIDINE 20 MG: 20 TABLET, FILM COATED ORAL at 21:29

## 2017-03-27 RX ADMIN — ENOXAPARIN SODIUM 30 MG: 100 INJECTION SUBCUTANEOUS at 21:29

## 2017-03-27 RX ADMIN — DOCUSATE SODIUM 100 MG: 100 CAPSULE ORAL at 08:30

## 2017-03-27 RX ADMIN — METHADONE HYDROCHLORIDE 10 MG: 10 TABLET ORAL at 21:29

## 2017-03-27 ASSESSMENT — PAIN SCALES - GENERAL
PAINLEVEL_OUTOF10: 8
PAINLEVEL_OUTOF10: 4
PAINLEVEL_OUTOF10: 5
PAINLEVEL_OUTOF10: 7
PAINLEVEL_OUTOF10: 6
PAINLEVEL_OUTOF10: 4
PAINLEVEL_OUTOF10: 6
PAINLEVEL_OUTOF10: 7
PAINLEVEL_OUTOF10: 9
PAINLEVEL_OUTOF10: 4
PAINLEVEL_OUTOF10: 4
PAINLEVEL_OUTOF10: 8
PAINLEVEL_OUTOF10: 5

## 2017-03-27 ASSESSMENT — PATIENT HEALTH QUESTIONNAIRE - PHQ9
1. LITTLE INTEREST OR PLEASURE IN DOING THINGS: SEVERAL DAYS
2. FEELING DOWN, DEPRESSED, IRRITABLE, OR HOPELESS: SEVERAL DAYS
SUM OF ALL RESPONSES TO PHQ9 QUESTIONS 1 AND 2: 2
SUM OF ALL RESPONSES TO PHQ QUESTIONS 1-9: 2

## 2017-03-27 NOTE — PROGRESS NOTES
"Neurosurgery :      Exam:   In a lot of pain. Thick raspy cough,  BLE strength 5/5      Blood pressure 135/90, pulse 93, temperature 36.7 °C (98 °F), resp. rate 34, height 1.626 m (5' 4\"), weight 72.8 kg (160 lb 7.9 oz), SpO2 97 %.    Recent Labs      03/25/17   0233   03/25/17   2146  03/26/17   0616  03/27/17   0522   WBC  13.3*   --    --   11.4*  10.5   RBC  4.58   --    --   3.72*  3.78*   HEMOGLOBIN  14.2   < >  13.0  11.9*  11.9*   HEMATOCRIT  43.1   --    --   35.8*  37.2   MCV  94.1   --    --   96.2  98.4*   MCH  31.0   --    --   32.0  31.5   MCHC  32.9*   --    --   33.2*  32.0*   RDW  44.0   --    --   47.3  46.2   PLATELETCT  247   --    --   189  176   MPV  9.9   --    --   10.4  10.0    < > = values in this interval not displayed.     Recent Labs      03/25/17   0233  03/26/17   0616  03/27/17   0522   SODIUM  138  139  135   POTASSIUM  3.6  3.8  3.9   CHLORIDE  106  107  103   CO2  24  26  27   GLUCOSE  131*  122*  115*   BUN  12  16  12     Recent Labs      03/25/17   0233   APTT  <20.0*   INR  0.93            Intake/Output Summary (Last 24 hours) at 03/27/17 0908  Last data filed at 03/27/17 0600   Gross per 24 hour   Intake   3500 ml   Output    850 ml   Net   2650 ml         • methadone  10 mg     • oxycodone immediate-release  5 mg     • Respiratory Care per Protocol       • docusate sodium  100 mg     • senna-docusate  1 Tab     • senna-docusate  1 Tab     • polyethylene glycol/lytes  1 Packet     • magnesium hydroxide  30 mL     • bisacodyl  10 mg     • fleet  1 Each     • LR   75 mL/hr at 03/26/17 1124   • morphine injection  2-4 mg     • famotidine  20 mg      Or   • famotidine  20 mg     • ondansetron  4 mg     • lorazepam  1-2 mg      Or   • lorazepam  3-4 mg     • thiamine  100 mg      And   • folic acid  1 mg      And   • multivitamin  1 Tab           Assessment and Plan:  Hospital day #2    Neuromotor intact    MRI T - spine most consistent with an old calcified disc, incidental " finding from the trauma presentation.     Repeat MRI in 8 weeks F/U with Dr Felipe in 8 weeks.

## 2017-03-27 NOTE — CARE PLAN
Problem: Venous Thromboembolism (VTW)/Deep Vein Thrombosis (DVT) Prevention:  Goal: Patient will participate in Venous Thrombosis (VTE)/Deep Vein Thrombosis (DVT)Prevention Measures  Outcome: PROGRESSING AS EXPECTED  Patient makes frequent changes in position in bed. Patient started on Lovenox today and consents to the medication. Ambulation encouraged and performed as the patient is able to.     Problem: Pain Management  Goal: Pain level will decrease to patient’s comfort goal  Outcome: PROGRESSING SLOWER THAN EXPECTED  Patient is frequently somnolent, requiring careful use of pain medications. Patient does not maintain adequate arousal to participate in incentive spirometry. Patient given pain meds per MAR as necessary to participate in plan of care.

## 2017-03-27 NOTE — PROGRESS NOTES
"  Trauma/Surgical Progress Note    Author: Jolly Hernandez Date & Time created: 3/27/2017   11:06 AM     Interval Events:  Pain control issues continue. Poor pulm toilet. Hg stable. Start lovenox. Adv diet. Pain management.    Hemodynamics:  Blood pressure 135/90, pulse 93, temperature 36.7 °C (98 °F), resp. rate 34, height 1.626 m (5' 4\"), weight 72.8 kg (160 lb 7.9 oz), SpO2 97 %.     Respiratory:    Respiration: (!) 34, Pulse Oximetry: 97 %, O2 Daily Delivery Respiratory : Silicone Nasal Cannula     PEP/CPT Method: Positive Airway Pressure Device, Work Of Breathing / Effort: Shallow  RUL Breath Sounds: Crackles, RML Breath Sounds: Crackles, RLL Breath Sounds: Diminished, LUIS M Breath Sounds: Crackles, LLL Breath Sounds: Diminished  Fluids:    Intake/Output Summary (Last 24 hours) at 03/27/17 1107  Last data filed at 03/27/17 0600   Gross per 24 hour   Intake   2800 ml   Output    850 ml   Net   1950 ml         Admit Weight: 74.844 kg (165 lb)  Current Weight: 72.8 kg (160 lb 7.9 oz)    Physical Exam   Constitutional: She appears well-developed and well-nourished.   HENT:   Head: Normocephalic.   L periorbital ecchymosis   Neck: Neck supple.   Cardiovascular: Normal rate and regular rhythm.    Pulmonary/Chest: Effort normal. She exhibits tenderness.   Abdominal: Soft. She exhibits no distension. There is tenderness.   Genitourinary:   Walden to gravity   Musculoskeletal: Normal range of motion.   Neurological: She is alert.   Skin: Skin is warm.       Medical Decision Making/Problem List:    Active Hospital Problems    Diagnosis   • Closed fracture of multiple ribs of left side [S22.42XA]     Priority: High     Left second through ninth rib fractures with flail segment of third and fourth ribs  Blunt chest protocol. Aggressive pulmonary hygiene and pain management.       • Traumatic pneumothorax [S27.0XXA]     Priority: High     Small left anterior and lung base pneumothorax  Serial CXRs unchanged.     • Splenic " laceration [S36.039A]     Priority: High     Splenic fracture involving the hilum with hazy central density suggesting active extravasation  Serial hg  Hemodynamically stable     • Multiple facial fractures (CMS-HCC) [S02.92XA]     Priority: Medium     Comminuted fracture of lateral wall of left maxillary sinus. Fracture of the anterolateral wall of the left maxillary sinus extending into inferior and lateral wall of the left orbit which is slightly displaced. Left zygomatic arch fracture.  Follow up after swelling decreases  [place] - Facial fractures     • Closed fracture of lumbar vertebra ([facility]) [S32.009A]     Priority: Medium      Left second through sixth transverse process fractures.  Bony projection into spinal canal at T7 resulting component of canal narrowing.  Sclerosis and slight expansion of the posterior elements of T1 and C7.   MRI - T7-T8 moderate-sized ventral extradural defect corresponding to a prominent ventral extradural calcification   [facility] - Neurosurgery     • Inadequate anticoagulation [Z51.81, Z79.01]     Priority: Medium     Prophylactic Lovenox initially contraindicated due to elevated bleeding risk from spleen injury  Will need screening duplex if lovenox not started within 48 hours of admission.  3/27 - Hg stable - start lovenox     • Alcohol intoxication (CMS-HCC) [F10.129]     Priority: Low     Admit BA .22  Monitor for withdrawal       Core Measures & Quality Metrics:  Labs reviewed, Medications reviewed and Radiology images reviewed  Walden catheter: Critically Ill - Requiring Accurate Measurement of Urinary Output      DVT Prophylaxis: Contraindicated - High bleeding risk  DVT prophylaxis - mechanical: SCDs  Ulcer prophylaxis: Yes    Assessed for rehab: Patient unable to tolerate rehabilitation therapeutic regimen    CHELSY Score  Discussed patient condition with RN, RT, Therapies, Pharmacy, Dietary,  and Patient.  CRITICAL CARE TIME EXCLUDING PROCEDURES: 37   minutes

## 2017-03-27 NOTE — CARE PLAN
Problem: Psychosocial Needs:  Goal: Level of anxiety will decrease  POC discussed with patient at bedside. All questions answered. Patient encouraged to participate in care and ask questions.    Problem: Skin Integrity  Goal: Risk for impaired skin integrity will decrease  Patient skin assessed and documented. All interventions in place to improve patient skin integrity.

## 2017-03-28 ENCOUNTER — APPOINTMENT (OUTPATIENT)
Dept: RADIOLOGY | Facility: MEDICAL CENTER | Age: 47
DRG: 964 | End: 2017-03-28
Attending: SURGERY
Payer: COMMERCIAL

## 2017-03-28 PROBLEM — R45.89 SUICIDAL BEHAVIOR: Status: ACTIVE | Noted: 2017-03-28

## 2017-03-28 PROBLEM — E87.1 HYPONATREMIA: Status: ACTIVE | Noted: 2017-03-28

## 2017-03-28 LAB
ALBUMIN SERPL BCP-MCNC: 3.3 G/DL (ref 3.2–4.9)
ALBUMIN/GLOB SERPL: 1.1 G/DL
ALP SERPL-CCNC: 61 U/L (ref 30–99)
ALT SERPL-CCNC: 21 U/L (ref 2–50)
ANION GAP SERPL CALC-SCNC: 3 MMOL/L (ref 0–11.9)
AST SERPL-CCNC: 17 U/L (ref 12–45)
BASOPHILS # BLD AUTO: 0.2 % (ref 0–1.8)
BASOPHILS # BLD: 0.02 K/UL (ref 0–0.12)
BILIRUB SERPL-MCNC: 1 MG/DL (ref 0.1–1.5)
BUN SERPL-MCNC: 7 MG/DL (ref 8–22)
CALCIUM SERPL-MCNC: 8.6 MG/DL (ref 8.5–10.5)
CHLORIDE SERPL-SCNC: 98 MMOL/L (ref 96–112)
CO2 SERPL-SCNC: 33 MMOL/L (ref 20–33)
CREAT SERPL-MCNC: 0.56 MG/DL (ref 0.5–1.4)
EOSINOPHIL # BLD AUTO: 0.1 K/UL (ref 0–0.51)
EOSINOPHIL NFR BLD: 1.1 % (ref 0–6.9)
ERYTHROCYTE [DISTWIDTH] IN BLOOD BY AUTOMATED COUNT: 45.1 FL (ref 35.9–50)
GFR SERPL CREATININE-BSD FRML MDRD: >60 ML/MIN/1.73 M 2
GLOBULIN SER CALC-MCNC: 3 G/DL (ref 1.9–3.5)
GLUCOSE SERPL-MCNC: 121 MG/DL (ref 65–99)
HCT VFR BLD AUTO: 33.1 % (ref 37–47)
HGB BLD-MCNC: 10.9 G/DL (ref 12–16)
IMM GRANULOCYTES # BLD AUTO: 0.06 K/UL (ref 0–0.11)
IMM GRANULOCYTES NFR BLD AUTO: 0.7 % (ref 0–0.9)
LYMPHOCYTES # BLD AUTO: 1.23 K/UL (ref 1–4.8)
LYMPHOCYTES NFR BLD: 13.5 % (ref 22–41)
MCH RBC QN AUTO: 32.2 PG (ref 27–33)
MCHC RBC AUTO-ENTMCNC: 32.9 G/DL (ref 33.6–35)
MCV RBC AUTO: 97.6 FL (ref 81.4–97.8)
MONOCYTES # BLD AUTO: 0.8 K/UL (ref 0–0.85)
MONOCYTES NFR BLD AUTO: 8.8 % (ref 0–13.4)
NEUTROPHILS # BLD AUTO: 6.87 K/UL (ref 2–7.15)
NEUTROPHILS NFR BLD: 75.7 % (ref 44–72)
NRBC # BLD AUTO: 0 K/UL
NRBC BLD AUTO-RTO: 0 /100 WBC
PLATELET # BLD AUTO: 182 K/UL (ref 164–446)
PMV BLD AUTO: 10.1 FL (ref 9–12.9)
POTASSIUM SERPL-SCNC: 4 MMOL/L (ref 3.6–5.5)
PROT SERPL-MCNC: 6.3 G/DL (ref 6–8.2)
RBC # BLD AUTO: 3.39 M/UL (ref 4.2–5.4)
SODIUM SERPL-SCNC: 134 MMOL/L (ref 135–145)
WBC # BLD AUTO: 9.1 K/UL (ref 4.8–10.8)

## 2017-03-28 PROCEDURE — 700112 HCHG RX REV CODE 229: Performed by: SURGERY

## 2017-03-28 PROCEDURE — 94668 MNPJ CHEST WALL SBSQ: CPT

## 2017-03-28 PROCEDURE — 700111 HCHG RX REV CODE 636 W/ 250 OVERRIDE (IP): Performed by: SURGERY

## 2017-03-28 PROCEDURE — 71010 DX-CHEST-LIMITED (1 VIEW): CPT

## 2017-03-28 PROCEDURE — 80053 COMPREHEN METABOLIC PANEL: CPT

## 2017-03-28 PROCEDURE — 700105 HCHG RX REV CODE 258: Performed by: SURGERY

## 2017-03-28 PROCEDURE — A9270 NON-COVERED ITEM OR SERVICE: HCPCS | Performed by: SURGERY

## 2017-03-28 PROCEDURE — 700102 HCHG RX REV CODE 250 W/ 637 OVERRIDE(OP): Performed by: SURGERY

## 2017-03-28 PROCEDURE — 85025 COMPLETE CBC W/AUTO DIFF WBC: CPT

## 2017-03-28 PROCEDURE — 770022 HCHG ROOM/CARE - ICU (200)

## 2017-03-28 PROCEDURE — 99233 SBSQ HOSP IP/OBS HIGH 50: CPT | Performed by: SURGERY

## 2017-03-28 RX ORDER — METHADONE HYDROCHLORIDE 10 MG/1
10 TABLET ORAL DAILY
Status: DISCONTINUED | OUTPATIENT
Start: 2017-03-29 | End: 2017-04-02

## 2017-03-28 RX ORDER — OXYCODONE HYDROCHLORIDE 10 MG/1
10 TABLET ORAL EVERY 4 HOURS PRN
Status: DISCONTINUED | OUTPATIENT
Start: 2017-03-28 | End: 2017-04-01

## 2017-03-28 RX ORDER — SODIUM CHLORIDE 9 MG/ML
INJECTION, SOLUTION INTRAVENOUS CONTINUOUS
Status: DISCONTINUED | OUTPATIENT
Start: 2017-03-28 | End: 2017-03-29

## 2017-03-28 RX ORDER — OXYCODONE HYDROCHLORIDE 5 MG/1
5 TABLET ORAL EVERY 4 HOURS PRN
Status: DISCONTINUED | OUTPATIENT
Start: 2017-03-28 | End: 2017-04-01

## 2017-03-28 RX ADMIN — Medication 100 MG: at 09:13

## 2017-03-28 RX ADMIN — FAMOTIDINE 20 MG: 20 TABLET, FILM COATED ORAL at 09:13

## 2017-03-28 RX ADMIN — MORPHINE SULFATE 2 MG: 4 INJECTION INTRAVENOUS at 12:09

## 2017-03-28 RX ADMIN — OXYCODONE HYDROCHLORIDE 5 MG: 5 TABLET ORAL at 09:14

## 2017-03-28 RX ADMIN — THERA TABS 1 TABLET: TAB at 09:13

## 2017-03-28 RX ADMIN — POLYETHYLENE GLYCOL 3350 1 PACKET: 17 POWDER, FOR SOLUTION ORAL at 09:19

## 2017-03-28 RX ADMIN — DOCUSATE SODIUM 100 MG: 100 CAPSULE ORAL at 20:23

## 2017-03-28 RX ADMIN — FAMOTIDINE 20 MG: 20 TABLET, FILM COATED ORAL at 20:23

## 2017-03-28 RX ADMIN — DOCUSATE SODIUM 100 MG: 100 CAPSULE ORAL at 09:14

## 2017-03-28 RX ADMIN — OXYCODONE HYDROCHLORIDE 10 MG: 5 TABLET ORAL at 19:30

## 2017-03-28 RX ADMIN — ENOXAPARIN SODIUM 30 MG: 100 INJECTION SUBCUTANEOUS at 20:23

## 2017-03-28 RX ADMIN — NICOTINE 14 MG: 14 PATCH TRANSDERMAL at 05:51

## 2017-03-28 RX ADMIN — SODIUM CHLORIDE: 9 INJECTION, SOLUTION INTRAVENOUS at 09:18

## 2017-03-28 RX ADMIN — STANDARDIZED SENNA CONCENTRATE AND DOCUSATE SODIUM 1 TABLET: 8.6; 5 TABLET, FILM COATED ORAL at 20:24

## 2017-03-28 RX ADMIN — ENOXAPARIN SODIUM 30 MG: 100 INJECTION SUBCUTANEOUS at 09:17

## 2017-03-28 RX ADMIN — OXYCODONE HYDROCHLORIDE 10 MG: 5 TABLET ORAL at 14:37

## 2017-03-28 RX ADMIN — MORPHINE SULFATE 2 MG: 4 INJECTION INTRAVENOUS at 02:35

## 2017-03-28 RX ADMIN — MORPHINE SULFATE 4 MG: 4 INJECTION INTRAVENOUS at 00:01

## 2017-03-28 RX ADMIN — POLYETHYLENE GLYCOL 3350 1 PACKET: 17 POWDER, FOR SOLUTION ORAL at 20:23

## 2017-03-28 RX ADMIN — SODIUM CHLORIDE, POTASSIUM CHLORIDE, SODIUM LACTATE AND CALCIUM CHLORIDE: 600; 310; 30; 20 INJECTION, SOLUTION INTRAVENOUS at 05:51

## 2017-03-28 RX ADMIN — FOLIC ACID 1 MG: 1 TABLET ORAL at 09:13

## 2017-03-28 RX ADMIN — METHADONE HYDROCHLORIDE 10 MG: 10 TABLET ORAL at 05:51

## 2017-03-28 ASSESSMENT — PAIN SCALES - GENERAL
PAINLEVEL_OUTOF10: 8
PAINLEVEL_OUTOF10: 3
PAINLEVEL_OUTOF10: 10
PAINLEVEL_OUTOF10: 6
PAINLEVEL_OUTOF10: 10
PAINLEVEL_OUTOF10: 4
PAINLEVEL_OUTOF10: 5
PAINLEVEL_OUTOF10: 8
PAINLEVEL_OUTOF10: 3
PAINLEVEL_OUTOF10: 8
PAINLEVEL_OUTOF10: 8
PAINLEVEL_OUTOF10: 3
PAINLEVEL_OUTOF10: 8
PAINLEVEL_OUTOF10: 10
PAINLEVEL_OUTOF10: 8

## 2017-03-28 NOTE — CARE PLAN
Problem: Infection  Goal: Will remain free from infection  Intervention: Assess signs and symptoms of infection  Pt at increased risk of infection due to presence of lines and devices. Pt assessed regularly for signs and symptoms of infection and MD notified of any changes. Interventions in place.

## 2017-03-28 NOTE — PROGRESS NOTES
"  Trauma/Surgical Progress Note    Author: Jolly Hernandez Date & Time created: 3/28/2017   10:46 AM     Interval Events:  Pain control issues continue. Still poor pulm toilet - mobilizing with difficulty. Hg stable. Psych eval due to Legal 2000 placed.  Poor pulm toilet and oxygen requirements preclude orosco care.    Hemodynamics:  Blood pressure 135/90, pulse 85, temperature 36.7 °C (98.1 °F), resp. rate 25, height 1.626 m (5' 4\"), weight 75.7 kg (166 lb 14.2 oz), SpO2 94 %.     Respiratory:    Respiration: (!) 25, Pulse Oximetry: 94 %, O2 Daily Delivery Respiratory : Silicone Nasal Cannula     PEP/CPT Method: Positive Airway Pressure Device, Work Of Breathing / Effort: Moderate;Shallow;Tachypnea  RUL Breath Sounds: Diminished, RML Breath Sounds: Diminished, RLL Breath Sounds: Diminished, LUIS M Breath Sounds: Diminished, LLL Breath Sounds: Diminished  Fluids:    Intake/Output Summary (Last 24 hours) at 03/28/17 1046  Last data filed at 03/28/17 0600   Gross per 24 hour   Intake   2280 ml   Output   1135 ml   Net   1145 ml         Admit Weight: 74.844 kg (165 lb)  Current Weight: 75.7 kg (166 lb 14.2 oz)    Physical Exam   Constitutional: She appears well-developed and well-nourished.   HENT:   Head: Normocephalic.   L periorbital ecchymosis   Neck: Neck supple.   Cardiovascular: Normal rate and regular rhythm.    Pulmonary/Chest: Effort normal. She exhibits tenderness.   Abdominal: Soft. She exhibits no distension. There is tenderness.   Genitourinary:   Walden to gravity   Musculoskeletal: Normal range of motion.   Neurological: She is alert.   Skin: Skin is warm.       Medical Decision Making/Problem List:    Active Hospital Problems    Diagnosis   • Closed fracture of multiple ribs of left side [S22.42XA]     Priority: High     Left second through ninth rib fractures with flail segment of third and fourth ribs  Blunt chest protocol. Aggressive pulmonary hygiene and pain management.  Poor compliance with pulm " toilet       • Splenic laceration [S36.039A]     Priority: High     Splenic fracture involving the hilum with hazy central density suggesting active extravasation  Serial hg stable  Hemodynamically stable     • Suicidal behavior [R46.89]     Priority: Medium     Possible fall was intentional  Legal 2000 placed by police  Psych consult placed     • Multiple facial fractures (CMS-HCC) [S02.92XA]     Priority: Medium     Comminuted fracture of lateral wall of left maxillary sinus. Fracture of anterolateral wall of the left maxillary sinus extending into inferior and lateral wall of left orbit which is slightly displaced. Left zygomatic arch fracture.  Follow up after swelling decreases  [facility] - Facial fractures     • Traumatic pneumothorax [S27.0XXA]     Priority: Medium     Small left anterior and lung base pneumothorax  Serial CXRs unchanged.     • Closed fracture of lumbar vertebra ([facility]) [S32.009A]     Priority: Medium      Left second through sixth transverse process fractures.  Bony projection into spinal canal at T7 resulting component of canal narrowing.  Sclerosis and slight expansion of the posterior elements of T1 and C7.   MRI - T7-T8 moderate-sized ventral extradural defect corresponding to a prominent ventral extradural calcification   [facility] - Neurosurgery     • Inadequate anticoagulation [Z51.81, Z79.01]     Priority: Medium     Prophylactic Lovenox initially contraindicated due to elevated bleeding risk from spleen injury  3/27 - Hg stable - started lovenox     • Hyponatremia [E87.1]     Priority: Low     Replete and trend     • Alcohol intoxication (CMS-HCC) [F10.129]     Priority: Low     Admit BA .22  Monitor for withdrawal       Core Measures & Quality Metrics:  Labs reviewed, Medications reviewed and Radiology images reviewed  Walden catheter: Critically Ill - Requiring Accurate Measurement of Urinary Output      DVT Prophylaxis: Contraindicated - High bleeding risk  DVT prophylaxis -  mechanical: SCDs  Ulcer prophylaxis: Yes    Assessed for rehab: Patient unable to tolerate rehabilitation therapeutic regimen    CHELSY Score  Discussed patient condition with RN, RT, Therapies, Pharmacy, Dietary,  and Patient.  CRITICAL CARE TIME EXCLUDING PROCEDURES: 38  minutes

## 2017-03-28 NOTE — CARE PLAN
Problem: Pain Management  Goal: Pain level will decrease to patient’s comfort goal  Medicated per MAR for pain    Problem: Respiratory:  Goal: Respiratory status will improve  Coordinate with RT for optimal O2 saturations, encourage IS use and T/C/DB
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
## 2017-03-28 NOTE — DISCHARGE PLANNING
Legal Hold    Date of Legal Hold: 17  Time of Legal Hold: 0200    Where was patient when legal hold initiated: [facility], placed by [employer]    Legal Hold will : Filed on behalf of MD.     Medical Clearance Complete: NO    Psychiatric Certification Complete: YES       Paperwork sent to  for extension: YES     What doctor will be signing the extension: Calvin    Extension paperwork attained: IN PROCESS     Will patient present to [facility] mental health on Wednesday morning?: NO    Referral placed to Psychiatric Facility: NO    Ongoing Plan: Pt on legal hold, has been evaluated by psych and certification has been completed and all paperwork sent to Jelly Tabares's office. Filing on behalf of MD.

## 2017-03-28 NOTE — CARE PLAN
Problem: Respiratory:  Goal: Respiratory status will improve  Intervention: Assess and monitor pulmonary status  Pt on 6 L NC with diminished breath sounds. Weak cough, with weak effort on Incentive spirometry at this time. Pt encouraged to perform IS q 1hr when awake. Pt encouraged to deep breathe and cough. Splinting for pain covered.
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
## 2017-03-28 NOTE — DISCHARGE PLANNING
Medical Social Work    Referral: F/U with legal hold status    Intervention: Discussed with Aimee Stanley at Jelly Tabares's office, spoke to attending MD and pt will not be medically cleared within 72 hours therefore 's office will file on behalf of Dr. Hernandez and extend legal hold.     Spoke to Dr. Simpson, she has evaluated pt and completed certification.     Emailed completed legal hold to Aimee at Jelly Tabares's office to file for extension.     Plan: As above, pt on legal hold and certification complete. Pt is not medically cleared.

## 2017-03-29 ENCOUNTER — APPOINTMENT (OUTPATIENT)
Dept: RADIOLOGY | Facility: MEDICAL CENTER | Age: 47
DRG: 964 | End: 2017-03-29
Attending: SURGERY
Payer: COMMERCIAL

## 2017-03-29 PROBLEM — H53.2 DIPLOPIA: Status: ACTIVE | Noted: 2017-03-29

## 2017-03-29 PROBLEM — E87.1 HYPONATREMIA: Status: RESOLVED | Noted: 2017-03-28 | Resolved: 2017-03-29

## 2017-03-29 LAB
ALBUMIN SERPL BCP-MCNC: 3 G/DL (ref 3.2–4.9)
ALBUMIN/GLOB SERPL: 1 G/DL
ALP SERPL-CCNC: 52 U/L (ref 30–99)
ALT SERPL-CCNC: 17 U/L (ref 2–50)
ANION GAP SERPL CALC-SCNC: 3 MMOL/L (ref 0–11.9)
AST SERPL-CCNC: 13 U/L (ref 12–45)
BASOPHILS # BLD AUTO: 0.3 % (ref 0–1.8)
BASOPHILS # BLD: 0.02 K/UL (ref 0–0.12)
BILIRUB SERPL-MCNC: 0.7 MG/DL (ref 0.1–1.5)
BUN SERPL-MCNC: 8 MG/DL (ref 8–22)
CALCIUM SERPL-MCNC: 8.6 MG/DL (ref 8.5–10.5)
CHLORIDE SERPL-SCNC: 99 MMOL/L (ref 96–112)
CO2 SERPL-SCNC: 33 MMOL/L (ref 20–33)
CREAT SERPL-MCNC: 0.48 MG/DL (ref 0.5–1.4)
EOSINOPHIL # BLD AUTO: 0.12 K/UL (ref 0–0.51)
EOSINOPHIL NFR BLD: 1.7 % (ref 0–6.9)
ERYTHROCYTE [DISTWIDTH] IN BLOOD BY AUTOMATED COUNT: 43.9 FL (ref 35.9–50)
GFR SERPL CREATININE-BSD FRML MDRD: >60 ML/MIN/1.73 M 2
GLOBULIN SER CALC-MCNC: 2.9 G/DL (ref 1.9–3.5)
GLUCOSE SERPL-MCNC: 122 MG/DL (ref 65–99)
HCT VFR BLD AUTO: 31.7 % (ref 37–47)
HGB BLD-MCNC: 10.5 G/DL (ref 12–16)
IMM GRANULOCYTES # BLD AUTO: 0.02 K/UL (ref 0–0.11)
IMM GRANULOCYTES NFR BLD AUTO: 0.3 % (ref 0–0.9)
LYMPHOCYTES # BLD AUTO: 1.49 K/UL (ref 1–4.8)
LYMPHOCYTES NFR BLD: 20.7 % (ref 22–41)
MCH RBC QN AUTO: 32.1 PG (ref 27–33)
MCHC RBC AUTO-ENTMCNC: 33.1 G/DL (ref 33.6–35)
MCV RBC AUTO: 96.9 FL (ref 81.4–97.8)
MONOCYTES # BLD AUTO: 0.6 K/UL (ref 0–0.85)
MONOCYTES NFR BLD AUTO: 8.3 % (ref 0–13.4)
NEUTROPHILS # BLD AUTO: 4.95 K/UL (ref 2–7.15)
NEUTROPHILS NFR BLD: 68.7 % (ref 44–72)
NRBC # BLD AUTO: 0 K/UL
NRBC BLD AUTO-RTO: 0 /100 WBC
PLATELET # BLD AUTO: 188 K/UL (ref 164–446)
PMV BLD AUTO: 10 FL (ref 9–12.9)
POTASSIUM SERPL-SCNC: 4.1 MMOL/L (ref 3.6–5.5)
PROT SERPL-MCNC: 5.9 G/DL (ref 6–8.2)
RBC # BLD AUTO: 3.27 M/UL (ref 4.2–5.4)
SODIUM SERPL-SCNC: 135 MMOL/L (ref 135–145)
WBC # BLD AUTO: 7.2 K/UL (ref 4.8–10.8)

## 2017-03-29 PROCEDURE — 700102 HCHG RX REV CODE 250 W/ 637 OVERRIDE(OP): Performed by: SURGERY

## 2017-03-29 PROCEDURE — 94668 MNPJ CHEST WALL SBSQ: CPT

## 2017-03-29 PROCEDURE — 71010 DX-CHEST-LIMITED (1 VIEW): CPT

## 2017-03-29 PROCEDURE — 700111 HCHG RX REV CODE 636 W/ 250 OVERRIDE (IP): Performed by: SURGERY

## 2017-03-29 PROCEDURE — 700105 HCHG RX REV CODE 258: Performed by: SURGERY

## 2017-03-29 PROCEDURE — 770022 HCHG ROOM/CARE - ICU (200)

## 2017-03-29 PROCEDURE — 85025 COMPLETE CBC W/AUTO DIFF WBC: CPT

## 2017-03-29 PROCEDURE — A9270 NON-COVERED ITEM OR SERVICE: HCPCS | Performed by: SURGERY

## 2017-03-29 PROCEDURE — 700112 HCHG RX REV CODE 229: Performed by: SURGERY

## 2017-03-29 PROCEDURE — 99233 SBSQ HOSP IP/OBS HIGH 50: CPT | Performed by: SURGERY

## 2017-03-29 PROCEDURE — 80053 COMPREHEN METABOLIC PANEL: CPT

## 2017-03-29 RX ADMIN — MORPHINE SULFATE 2 MG: 4 INJECTION INTRAVENOUS at 02:06

## 2017-03-29 RX ADMIN — OXYCODONE HYDROCHLORIDE 10 MG: 5 TABLET ORAL at 04:39

## 2017-03-29 RX ADMIN — MAGNESIUM HYDROXIDE 30 ML: 400 SUSPENSION ORAL at 09:13

## 2017-03-29 RX ADMIN — METHADONE HYDROCHLORIDE 10 MG: 10 TABLET ORAL at 09:13

## 2017-03-29 RX ADMIN — FAMOTIDINE 20 MG: 20 TABLET, FILM COATED ORAL at 09:13

## 2017-03-29 RX ADMIN — SODIUM CHLORIDE: 9 INJECTION, SOLUTION INTRAVENOUS at 04:01

## 2017-03-29 RX ADMIN — POLYETHYLENE GLYCOL 3350 1 PACKET: 17 POWDER, FOR SOLUTION ORAL at 21:03

## 2017-03-29 RX ADMIN — OXYCODONE HYDROCHLORIDE 10 MG: 5 TABLET ORAL at 11:45

## 2017-03-29 RX ADMIN — NICOTINE 14 MG: 14 PATCH TRANSDERMAL at 05:07

## 2017-03-29 RX ADMIN — OXYCODONE HYDROCHLORIDE 10 MG: 5 TABLET ORAL at 00:02

## 2017-03-29 RX ADMIN — ENOXAPARIN SODIUM 30 MG: 100 INJECTION SUBCUTANEOUS at 09:13

## 2017-03-29 RX ADMIN — FAMOTIDINE 20 MG: 20 TABLET, FILM COATED ORAL at 21:03

## 2017-03-29 RX ADMIN — ENOXAPARIN SODIUM 30 MG: 100 INJECTION SUBCUTANEOUS at 21:03

## 2017-03-29 RX ADMIN — POLYETHYLENE GLYCOL 3350 1 PACKET: 17 POWDER, FOR SOLUTION ORAL at 09:13

## 2017-03-29 RX ADMIN — DOCUSATE SODIUM 100 MG: 100 CAPSULE ORAL at 09:14

## 2017-03-29 RX ADMIN — STANDARDIZED SENNA CONCENTRATE AND DOCUSATE SODIUM 1 TABLET: 8.6; 5 TABLET, FILM COATED ORAL at 21:03

## 2017-03-29 RX ADMIN — DOCUSATE SODIUM 100 MG: 100 CAPSULE ORAL at 21:03

## 2017-03-29 RX ADMIN — OXYCODONE HYDROCHLORIDE 10 MG: 5 TABLET ORAL at 21:03

## 2017-03-29 ASSESSMENT — PAIN SCALES - GENERAL
PAINLEVEL_OUTOF10: 5
PAINLEVEL_OUTOF10: 8
PAINLEVEL_OUTOF10: 6
PAINLEVEL_OUTOF10: 7
PAINLEVEL_OUTOF10: 8
PAINLEVEL_OUTOF10: 7
PAINLEVEL_OUTOF10: 3
PAINLEVEL_OUTOF10: 9
PAINLEVEL_OUTOF10: 3
PAINLEVEL_OUTOF10: 4
PAINLEVEL_OUTOF10: 8
PAINLEVEL_OUTOF10: 8
PAINLEVEL_OUTOF10: 2
PAINLEVEL_OUTOF10: 8
PAINLEVEL_OUTOF10: 8

## 2017-03-29 NOTE — CARE PLAN
Problem: Pain Management  Goal: Pain level will decrease to patient’s comfort goal  Intervention: Follow pain managment plan developed in collaboration with patient and Interdisciplinary Team  Pt with significant amount of pain L side due to rib fractures. Pt encouraged to use pillow to splint with coughing, and pain assessed and managed according to plan developed with interdisciplinary team. Alternative methods to pain control also explored with pt.      Problem: Mobility  Goal: Risk for activity intolerance will decrease  Intervention: Assess and monitor signs of activity intolerance  Pt with significant pain when mobilizing and moving about from bed to chair. Pt assessed by RN for tolerance for mobilization and encouraged to move slowly with RN assist. Pt encouraged to mobilize frequently and safely by RN.

## 2017-03-29 NOTE — CARE PLAN
Problem: Safety  Goal: Will remain free from injury  Intervention: Provide assistance with mobility  Safety precautions in place      Problem: Pain Management  Goal: Pain level will decrease to patient’s comfort goal  Medicated as needed for pain

## 2017-03-29 NOTE — CARE PLAN
Problem: Hyperinflation:  Goal: Prevent or improve atelectasis  Outcome: PROGRESSING AS EXPECTED  Intervention: Instruct incentive spirometry usage  Continue with PEP and IS.

## 2017-03-29 NOTE — PROGRESS NOTES
"  Trauma/Surgical Progress Note    Author: Jolly DARSHAN Kettydi Date & Time created: 3/29/2017   10:13 AM     Interval Events:  Pain control issues continue.Poor pulm toilet precludes orosco transfer. Hg stable. Legal 2000 extended. On reg diet.     Hemodynamics:  Blood pressure 135/90, pulse 71, temperature 36.4 °C (97.5 °F), resp. rate 23, height 1.626 m (5' 4\"), weight 76 kg (167 lb 8.8 oz), SpO2 97 %.     Respiratory:    Respiration: (!) 23, Pulse Oximetry: 97 %, O2 Daily Delivery Respiratory : Silicone Nasal Cannula     PEP/CPT Method: Positive Airway Pressure Device, Work Of Breathing / Effort: Mild  RUL Breath Sounds: Fine Crackles, RML Breath Sounds: Fine Crackles, RLL Breath Sounds: Diminished, LUIS M Breath Sounds: Fine Crackles, LLL Breath Sounds: Diminished  Fluids:    Intake/Output Summary (Last 24 hours) at 03/29/17 1013  Last data filed at 03/29/17 0600   Gross per 24 hour   Intake   2190 ml   Output   1100 ml   Net   1090 ml         Admit Weight: 74.844 kg (165 lb)  Current Weight: 76 kg (167 lb 8.8 oz)    Physical Exam   Constitutional: She appears well-developed and well-nourished.   HENT:   Head: Normocephalic.   L periorbital ecchymosis   Eyes: EOM are normal.   Neck: Neck supple.   Cardiovascular: Normal rate and regular rhythm.    Pulmonary/Chest: Effort normal. She exhibits tenderness.   Abdominal: Soft. She exhibits no distension. There is no tenderness.   Musculoskeletal: Normal range of motion.   Neurological: She is alert.   Skin: Skin is warm.       Medical Decision Making/Problem List:    Active Hospital Problems    Diagnosis   • Closed fracture of multiple ribs of left side [S22.42XA]     Priority: High     Left second through ninth rib fractures with flail segment of third and fourth ribs  Blunt chest protocol. Aggressive pulmonary hygiene and pain management.  Poor compliance with pulm toilet.       • Splenic laceration [S36.039A]     Priority: High     Splenic fracture involving hilum with hazy " central density suggesting active extravasation  Serial hg stable  Hemodynamically stable     • Diplopia [H53.2]     Priority: Medium     Complaining of visual disturbance  [facility] - Opthalomology     • Suicidal behavior [R46.89]     Priority: Medium     Possible fall was intentional  Legal 2000 placed by police  Psych consult placed - following     • Multiple facial fractures (CMS-HCC) [S02.92XA]     Priority: Medium     Comminuted fracture of lateral wall of left maxillary sinus. Fracture of anterolateral wall of the left maxillary sinus extending into inferior and lateral wall of left orbit which is slightly displaced. Left zygomatic arch fracture.  Follow up after swelling decreases  EOMI intact  [facility] - Facial fractures     • Closed fracture of lumbar vertebra ([facility]) [S32.009A]     Priority: Medium      Left second through sixth transverse process fractures.  Bony projection into spinal canal at T7 resulting component of canal narrowing.  Sclerosis and slight expansion of the posterior elements of T1 and C7.   MRI - T7-T8 moderate-sized ventral extradural defect corresponding to a prominent ventral extradural calcification  Need follow up after discharge  [facility] - Neurosurgery     • Inadequate anticoagulation [Z51.81, Z79.01]     Priority: Medium     Prophylactic Lovenox initially contraindicated due to elevated bleeding risk from spleen injury.  3/27 - Hg stable - started lovenox     • Traumatic pneumothorax [S27.0XXA]     Priority: Low     Small left anterior and lung base pneumothorax  Serial CXRs unchanged     • Alcohol intoxication (CMS-HCC) [F10.129]     Priority: Low     Admit BA .22  Monitor for withdrawal       Core Measures & Quality Metrics:  Labs reviewed, Medications reviewed and Radiology images reviewed  Walden catheter: No Walden      DVT Prophylaxis: Enoxaparin (Lovenox)  DVT prophylaxis - mechanical: SCDs  Ulcer prophylaxis: Yes    Assessed for rehab: Patient was assess for and/or received  rehabilitation services during this hospitalization    CHELSY Score  Discussed patient condition with RN, RT, Therapies, Pharmacy, Dietary,  and Patient.  CRITICAL CARE TIME EXCLUDING PROCEDURES: 37  minutes

## 2017-03-29 NOTE — PROGRESS NOTES
Patient complaining of double vision and blurry vision in left eye. Dr. Hernandez notified during rounds, call placed to Dr. Wilhelm per Dr. Hernandez

## 2017-03-29 NOTE — PROGRESS NOTES
RN called  per patient wife request, Melissa to bedside to speak with patient regarding discharge planning.

## 2017-03-30 ENCOUNTER — APPOINTMENT (OUTPATIENT)
Dept: RADIOLOGY | Facility: MEDICAL CENTER | Age: 47
DRG: 964 | End: 2017-03-30
Attending: SURGERY
Payer: COMMERCIAL

## 2017-03-30 PROBLEM — M25.572 ACUTE LEFT ANKLE PAIN: Status: ACTIVE | Noted: 2017-03-30

## 2017-03-30 LAB
ALBUMIN SERPL BCP-MCNC: 3.4 G/DL (ref 3.2–4.9)
ALBUMIN/GLOB SERPL: 1.1 G/DL
ALP SERPL-CCNC: 55 U/L (ref 30–99)
ALT SERPL-CCNC: 14 U/L (ref 2–50)
ANION GAP SERPL CALC-SCNC: 4 MMOL/L (ref 0–11.9)
AST SERPL-CCNC: 13 U/L (ref 12–45)
BASOPHILS # BLD AUTO: 0.4 % (ref 0–1.8)
BASOPHILS # BLD: 0.02 K/UL (ref 0–0.12)
BILIRUB SERPL-MCNC: 0.5 MG/DL (ref 0.1–1.5)
BUN SERPL-MCNC: 8 MG/DL (ref 8–22)
CALCIUM SERPL-MCNC: 8.8 MG/DL (ref 8.5–10.5)
CHLORIDE SERPL-SCNC: 99 MMOL/L (ref 96–112)
CO2 SERPL-SCNC: 33 MMOL/L (ref 20–33)
CREAT SERPL-MCNC: 0.49 MG/DL (ref 0.5–1.4)
EOSINOPHIL # BLD AUTO: 0.18 K/UL (ref 0–0.51)
EOSINOPHIL NFR BLD: 3.3 % (ref 0–6.9)
ERYTHROCYTE [DISTWIDTH] IN BLOOD BY AUTOMATED COUNT: 43.7 FL (ref 35.9–50)
GFR SERPL CREATININE-BSD FRML MDRD: >60 ML/MIN/1.73 M 2
GLOBULIN SER CALC-MCNC: 3.2 G/DL (ref 1.9–3.5)
GLUCOSE SERPL-MCNC: 124 MG/DL (ref 65–99)
HCT VFR BLD AUTO: 35.5 % (ref 37–47)
HGB BLD-MCNC: 11.5 G/DL (ref 12–16)
IMM GRANULOCYTES # BLD AUTO: 0.02 K/UL (ref 0–0.11)
IMM GRANULOCYTES NFR BLD AUTO: 0.4 % (ref 0–0.9)
LYMPHOCYTES # BLD AUTO: 1.81 K/UL (ref 1–4.8)
LYMPHOCYTES NFR BLD: 33.5 % (ref 22–41)
MAGNESIUM SERPL-MCNC: 2.4 MG/DL (ref 1.5–2.5)
MCH RBC QN AUTO: 31.4 PG (ref 27–33)
MCHC RBC AUTO-ENTMCNC: 32.4 G/DL (ref 33.6–35)
MCV RBC AUTO: 97 FL (ref 81.4–97.8)
MONOCYTES # BLD AUTO: 0.48 K/UL (ref 0–0.85)
MONOCYTES NFR BLD AUTO: 8.9 % (ref 0–13.4)
NEUTROPHILS # BLD AUTO: 2.9 K/UL (ref 2–7.15)
NEUTROPHILS NFR BLD: 53.5 % (ref 44–72)
NRBC # BLD AUTO: 0 K/UL
NRBC BLD AUTO-RTO: 0 /100 WBC
PHOSPHATE SERPL-MCNC: 3 MG/DL (ref 2.5–4.5)
PLATELET # BLD AUTO: 247 K/UL (ref 164–446)
PMV BLD AUTO: 10 FL (ref 9–12.9)
POTASSIUM SERPL-SCNC: 3.7 MMOL/L (ref 3.6–5.5)
PROT SERPL-MCNC: 6.6 G/DL (ref 6–8.2)
RBC # BLD AUTO: 3.66 M/UL (ref 4.2–5.4)
SODIUM SERPL-SCNC: 136 MMOL/L (ref 135–145)
WBC # BLD AUTO: 5.4 K/UL (ref 4.8–10.8)

## 2017-03-30 PROCEDURE — 700112 HCHG RX REV CODE 229: Performed by: SURGERY

## 2017-03-30 PROCEDURE — 73610 X-RAY EXAM OF ANKLE: CPT | Mod: LT

## 2017-03-30 PROCEDURE — 83735 ASSAY OF MAGNESIUM: CPT

## 2017-03-30 PROCEDURE — 99233 SBSQ HOSP IP/OBS HIGH 50: CPT | Performed by: SURGERY

## 2017-03-30 PROCEDURE — A9270 NON-COVERED ITEM OR SERVICE: HCPCS | Performed by: SURGERY

## 2017-03-30 PROCEDURE — 700111 HCHG RX REV CODE 636 W/ 250 OVERRIDE (IP): Performed by: SURGERY

## 2017-03-30 PROCEDURE — 80053 COMPREHEN METABOLIC PANEL: CPT

## 2017-03-30 PROCEDURE — 84100 ASSAY OF PHOSPHORUS: CPT

## 2017-03-30 PROCEDURE — 71010 DX-CHEST-LIMITED (1 VIEW): CPT

## 2017-03-30 PROCEDURE — 85025 COMPLETE CBC W/AUTO DIFF WBC: CPT

## 2017-03-30 PROCEDURE — 770022 HCHG ROOM/CARE - ICU (200)

## 2017-03-30 PROCEDURE — 700102 HCHG RX REV CODE 250 W/ 637 OVERRIDE(OP): Performed by: SURGERY

## 2017-03-30 RX ADMIN — MAGNESIUM HYDROXIDE 30 ML: 400 SUSPENSION ORAL at 09:29

## 2017-03-30 RX ADMIN — DOCUSATE SODIUM 100 MG: 100 CAPSULE ORAL at 09:29

## 2017-03-30 RX ADMIN — BISACODYL 10 MG: 10 SUPPOSITORY RECTAL at 05:42

## 2017-03-30 RX ADMIN — ENOXAPARIN SODIUM 30 MG: 100 INJECTION SUBCUTANEOUS at 20:33

## 2017-03-30 RX ADMIN — METHADONE HYDROCHLORIDE 10 MG: 10 TABLET ORAL at 09:29

## 2017-03-30 RX ADMIN — ENOXAPARIN SODIUM 30 MG: 100 INJECTION SUBCUTANEOUS at 09:28

## 2017-03-30 RX ADMIN — OXYCODONE HYDROCHLORIDE 10 MG: 5 TABLET ORAL at 12:51

## 2017-03-30 RX ADMIN — OXYCODONE HYDROCHLORIDE 10 MG: 5 TABLET ORAL at 05:42

## 2017-03-30 RX ADMIN — OXYCODONE HYDROCHLORIDE 10 MG: 5 TABLET ORAL at 20:35

## 2017-03-30 RX ADMIN — STANDARDIZED SENNA CONCENTRATE AND DOCUSATE SODIUM 1 TABLET: 8.6; 5 TABLET, FILM COATED ORAL at 20:34

## 2017-03-30 RX ADMIN — FAMOTIDINE 20 MG: 20 TABLET, FILM COATED ORAL at 20:34

## 2017-03-30 RX ADMIN — FAMOTIDINE 20 MG: 20 TABLET, FILM COATED ORAL at 09:29

## 2017-03-30 RX ADMIN — NICOTINE 14 MG: 14 PATCH TRANSDERMAL at 05:42

## 2017-03-30 RX ADMIN — DOCUSATE SODIUM 100 MG: 100 CAPSULE ORAL at 20:33

## 2017-03-30 ASSESSMENT — PAIN SCALES - GENERAL
PAINLEVEL_OUTOF10: 3
PAINLEVEL_OUTOF10: 6
PAINLEVEL_OUTOF10: 8
PAINLEVEL_OUTOF10: 3
PAINLEVEL_OUTOF10: 3
PAINLEVEL_OUTOF10: 6
PAINLEVEL_OUTOF10: 8
PAINLEVEL_OUTOF10: 6
PAINLEVEL_OUTOF10: 8
PAINLEVEL_OUTOF10: 6
PAINLEVEL_OUTOF10: 5
PAINLEVEL_OUTOF10: 8
PAINLEVEL_OUTOF10: 5
PAINLEVEL_OUTOF10: 5
PAINLEVEL_OUTOF10: 3
PAINLEVEL_OUTOF10: 5

## 2017-03-30 ASSESSMENT — ENCOUNTER SYMPTOMS
RESPIRATORY NEGATIVE: 1
GASTROINTESTINAL NEGATIVE: 1
CONSTITUTIONAL NEGATIVE: 1
EYES NEGATIVE: 1

## 2017-03-30 NOTE — CARE PLAN
Problem: Hyperinflation:  Goal: Prevent or improve atelectasis  Outcome: PROGRESSING AS EXPECTED  Intervention: Instruct incentive spirometry usage  Continue  PEP therapy

## 2017-03-30 NOTE — PROGRESS NOTES
"  Trauma/Surgical Progress Note    Author: Fortino Felipe Date & Time created: 3/30/2017   3:25 PM     Interval Events:    Pain control issues continue.  Legal 2000 extended.  Not candidate for transfer to floor due high risk for respiratory deterioration.  Mobilization    Review of Systems   Constitutional: Negative.    HENT: Negative.    Eyes: Negative.    Respiratory: Negative.    Cardiovascular: Positive for chest pain.   Gastrointestinal: Negative.    Genitourinary: Negative.    Musculoskeletal: Positive for joint pain.   Skin: Negative.      Hemodynamics:  Blood pressure 135/90, pulse 81, temperature 36.4 °C (97.5 °F), resp. rate 26, height 1.626 m (5' 4\"), weight 73 kg (160 lb 15 oz), SpO2 94 %.     Respiratory:    Respiration: (!) 26, Pulse Oximetry: 94 %, O2 Daily Delivery Respiratory : Silicone Nasal Cannula     PEP/CPT Method: Positive Airway Pressure Device, Work Of Breathing / Effort: Moderate  RUL Breath Sounds: Crackles, RML Breath Sounds: Crackles, RLL Breath Sounds: Diminished, LUIS M Breath Sounds: Crackles, LLL Breath Sounds: Diminished  Fluids:    Intake/Output Summary (Last 24 hours) at 03/30/17 1525  Last data filed at 03/30/17 0600   Gross per 24 hour   Intake    720 ml   Output   2550 ml   Net  -1830 ml     Admit Weight: 74.844 kg (165 lb)  Current Weight: 73 kg (160 lb 15 oz)    Physical Exam   Constitutional: She is oriented to person, place, and time. She appears well-developed and well-nourished.   HENT:   Head: Normocephalic.   L periorbital ecchymosis   Eyes: EOM are normal.   Neck: Neck supple.   Cardiovascular: Normal rate and regular rhythm.    Pulmonary/Chest: Effort normal. She exhibits tenderness.   Abdominal: Soft. She exhibits no distension. There is no tenderness.   Genitourinary:   voiding   Musculoskeletal: Normal range of motion. She exhibits edema and tenderness.   Left ankle pain / swelling   Neurological: She is alert and oriented to person, place, and time.   Skin: " Skin is warm and dry.   Nursing note and vitals reviewed.      Medical Decision Making/Problem List:    Active Hospital Problems    Diagnosis   • Closed fracture of multiple ribs of left side [S22.42XA]     Priority: High     Left second through ninth rib fractures with flail segment of third and fourth ribs  Blunt chest protocol. Aggressive pulmonary hygiene and pain management.  Poor compliance with pulm toilet.  High risk for respiratory deterioration     • Acute left ankle pain [M25.572]     Priority: Medium     Swelling and ecchymosis noted  Able to bare weight  3/30 X rays ordered     • Diplopia [H53.2]     Priority: Medium     Complaining of visual disturbance  Patch either eye  Re-check in office 1 month.  [facility] - Opthalomology     • Suicidal behavior [R46.89]     Priority: Medium     Possible fall was intentional  Legal 2000 placed by police  Psych consult placed - following     • Multiple facial fractures (CMS-HCC) [S02.92XA]     Priority: Medium     Comminuted fracture of lateral wall of left maxillary sinus.   Fracture of anterolateral wall of the left maxillary sinus extending into inferior and lateral wall of left orbit which is slightly displaced.   Left zygomatic arch fracture.  Follow up after swelling decreases  EOMI intact  [facility] - Facial fractures     • Splenic laceration [S36.039A]     Priority: Medium     Grade III splenic laceration  Splenic fracture involving hilum with hazy central density suggesting active extravasation  Serial Hb stable  Hemodynamically stable     • Closed fracture of lumbar vertebra (HCC) [S32.009A]     Priority: Medium      Left second through sixth transverse process fractures.  Bony projection into spinal canal at T7 resulting component of canal narrowing.  Sclerosis and slight expansion of the posterior elements of T1 and C7.   MRI - T7-T8 moderate-sized ventral extradural defect corresponding to a prominent ventral extradural calcification  Need follow up after  discharge  [facility] - Neurosurgery     • Inadequate anticoagulation [Z51.81, Z79.01]     Priority: Medium     Prophylactic Lovenox initially contraindicated due to elevated bleeding risk from spleen injury.  3/27 - Hb stable - started lovenox     • Traumatic pneumothorax [S27.0XXA]     Priority: Low     Small left anterior and lung base pneumothorax  Serial CXRs unchanged     • Alcohol intoxication (CMS-HCC) [F10.129]     Priority: Low     Admit BA .22  Monitor for withdrawal       Core Measures & Quality Metrics:  Labs reviewed, Medications reviewed and Radiology images reviewed  Walden catheter: No Walden      DVT Prophylaxis: Enoxaparin (Lovenox)  DVT prophylaxis - mechanical: SCDs  Ulcer prophylaxis: Yes    Assessed for rehab: Patient was assess for and/or received rehabilitation services during this hospitalization    CHELSY Score  Discussed patient condition with RN, RT, Pharmacy and Patient.

## 2017-03-30 NOTE — FLOWSHEET NOTE
03/30/17 1425   Events/Summary/Plan   Events/Summary/Plan IS done   Interdisciplinary Plan of Care-Goals (Indications)   Hyperinflation Protocol Indications Chest Trauma (follow Blunt Chest Protocol)   Interdisciplinary Plan of Care-Outcomes    Hyperinflation Protocol Goals/Outcome Greater Than 60% of Predicted I.S. Volume x 24 hrs   Education   Education Yes - Pt. / Family has been Instructed in use of Respiratory Equipment   Incentive Spirometry Group   Incentive Spirometry Instruction Yes   Breathing Exercises Yes   Incentive Spirometer Volume 1250 mL   Respiratory WDL   Respiratory (WDL) X   Chest Exam   Work Of Breathing / Effort Moderate   Respiration (!) 26   Pulse 81   Heart Rate (Monitored) 82   Breath Sounds   RUL Breath Sounds Crackles   RML Breath Sounds Crackles   RLL Breath Sounds Diminished   LUIS M Breath Sounds Crackles   LLL Breath Sounds Diminished   Oximetry   Continuous Oximetry Yes   Oxygen   Pulse Oximetry 94 %   O2 (LPM) 2   O2 Daily Delivery Respiratory  Silicone Nasal Cannula

## 2017-03-30 NOTE — CARE PLAN
Problem: Pain Management  Goal: Pain level will decrease to patient’s comfort goal  Intervention: Follow pain managment plan developed in collaboration with patient and Interdisciplinary Team  Pt receiving pain medication in accordance with MD orders. Pt verbalizes and expresses pain with incentive spirometry, deep breathing and mobilization. Alternative methods to pain control such as repositioning and ice pack also explored.

## 2017-03-30 NOTE — CARE PLAN
Problem: Bowel/Gastric:  Goal: Will not experience complications related to bowel motility  Intervention: Assess baseline bowel pattern  Pt has not had bowel movement since 3/26 despite administration of bowel meds. Pt to receive a suppository per MD order to aid with bowel evacuation. Procedure explained to pt.

## 2017-03-30 NOTE — CONSULTS
CC: double vision, OS >OD    HPI: pt S/P fall from 2 stories, multiple orbital/facxial fractures, rib fx, splenic laceration c/o double vision. Pt thinks occurring when both eyes are open and when one eye is open. No prior hx of diplopia. No previous eye surgeries. +eye pain OS, aching, occasionally sharp. Slight decrease vision OS.    POHx: RE  Nol surgeries    Past Medical History   Diagnosis Date   • Hypertension        Current facility-administered medications:   •  methadone (DOLOPHINE) tablet 10 mg, 10 mg, Oral, DAILY, Jolly Hernandez M.D., 10 mg at 03/29/17 0913  •  oxycodone immediate-release (ROXICODONE) tablet 5 mg, 5 mg, Oral, Q4HRS PRN **OR** oxycodone immediate-release (ROXICODONE) tablet 10 mg, 10 mg, Oral, Q4HRS PRN, Jolly Hernandez M.D., 10 mg at 03/29/17 1145  •  nicotine (NICODERM) 14 MG/24HR 14 mg, 14 mg, Transdermal, Daily-0600, Jolly Hernandez M.D., 14 mg at 03/29/17 0507  •  enoxaparin (LOVENOX) inj 30 mg, 30 mg, Subcutaneous, Q12HRS, Jolly Hernandez M.D., 30 mg at 03/29/17 0913  •  Respiratory Care per Protocol, , Nebulization, Continuous RT, Jolly Hernandez M.D.  •  docusate sodium (COLACE) capsule 100 mg, 100 mg, Oral, BID, Jolly Hernandez M.D., 100 mg at 03/29/17 0914  •  senna-docusate (PERICOLACE or SENOKOT S) 8.6-50 MG per tablet 1 Tab, 1 Tab, Oral, Nightly, Jolly Hernandez M.D., 1 Tab at 03/28/17 2024  •  senna-docusate (PERICOLACE or SENOKOT S) 8.6-50 MG per tablet 1 Tab, 1 Tab, Oral, Q24HRS PRN, Jolly Hernandez M.D.  •  polyethylene glycol/lytes (MIRALAX) PACKET 1 Packet, 1 Packet, Oral, BID, Jolly Hernandez M.D., 1 Packet at 03/29/17 0913  •  magnesium hydroxide (MILK OF MAGNESIA) suspension 30 mL, 30 mL, Oral, DAILY, Jolly Hernandez M.D., 30 mL at 03/29/17 0913  •  bisacodyl (DULCOLAX) suppository 10 mg, 10 mg, Rectal, Q24HRS PRN, Jolly Hernandez M.D.  •  fleet enema 133 mL, 1 Each, Rectal, Once PRN, Jolly Hernandez M.D.  •  morphine (pf) 4 mg/ml injection 2-4 mg, 2-4 mg,  Intravenous, Q HOUR PRN, Jolly Hernandez M.D., 2 mg at 03/29/17 0206  •  famotidine (PEPCID) tablet 20 mg, 20 mg, Oral, BID, 20 mg at 03/29/17 0913 **OR** [DISCONTINUED] famotidine (PEPCID) injection 20 mg, 20 mg, Intravenous, BID, Jolly Hernandez M.D., 20 mg at 03/27/17 0831  •  ondansetron (ZOFRAN) syringe/vial injection 4 mg, 4 mg, Intravenous, Q4HRS PRN, Jolly Hernandez M.D., 4 mg at 03/27/17 2157  •  lorazepam (ATIVAN) injection 1-2 mg, 1-2 mg, Intravenous, Q HOUR PRN, 2 mg at 03/27/17 0211 **OR** lorazepam (ATIVAN) injection 3-4 mg, 3-4 mg, Intravenous, Q HOUR PRN, Jolly Hernandez M.D., 4 mg at 03/25/17 1700    ROS:  Eyes as above  Rib fx  Abdominal pain  Facial pain  Other systems neg    Social History   Substance Use Topics   • Smoking status: Current Every Day Smoker -- 1.00 packs/day     Types: Cigarettes   • Smokeless tobacco: Not on file   • Alcohol Use: Yes      Comment: occasional     No family history on file.    Imaging:  Multiple facial fractures (CMS-[facility]) [S02.92XA]       Comminuted fracture of lateral wall of left maxillary sinus. Fracture of anterolateral wall of the left maxillary sinus extending into inferior and lateral wall of left orbit which is slightly displaced. Left zygomatic arch fracture.     EXAM:  Filed Vitals:    03/29/17 1400 03/29/17 1428 03/29/17 1500 03/29/17 1600   BP:       Pulse: 64 66 71 78   Temp: 36.6 °C (97.9 °F)   36.7 °C (98 °F)   Resp: 8 7 5 51   Height:       Weight:       SpO2: 99% 99% 97% 98%     A+O x4  Ext: ecchymosis, tr eyelid edema OS. Good closure, V1-V3 grossly intact  CVF full OU  Motility - slight decrease downgaze OS. +Exophoria  Pupils - early release OS, reactive OU  IOP - soft OU  SLE: ESSENCE OS, cornea clear OU, tr NSC OU.  AC deep and quiet OU.  DFE - C:D0/1 OU. no heme, edema, RT/RD OU.    A/P:    1. Diplopia - likely 2/2 facial/orbital fractures and orbital edema vs decompensated phoria. ?binocular vs monocular per pt subjectively. Of to patch either  eye to makeRe-check in office 1 month.    2. Orbital fractures - continue to follow ENT vs facial plastics.    3. Subconjunctival hemorrhage OS - mild. Will resolve without intervention. Monitor.    4. Cataract OU - mild OU. Follow as outpatient.      Haim Hernandez M.D.  [facility]  [phone]

## 2017-03-31 ENCOUNTER — APPOINTMENT (OUTPATIENT)
Dept: RADIOLOGY | Facility: MEDICAL CENTER | Age: 47
DRG: 964 | End: 2017-03-31
Attending: SURGERY
Payer: COMMERCIAL

## 2017-03-31 PROBLEM — S22.009A CLOSED FRACTURE OF THORACIC VERTEBRA (HCC): Status: ACTIVE | Noted: 2017-03-25

## 2017-03-31 LAB
ALBUMIN SERPL BCP-MCNC: 3.3 G/DL (ref 3.2–4.9)
ALBUMIN/GLOB SERPL: 1.1 G/DL
ALP SERPL-CCNC: 54 U/L (ref 30–99)
ALT SERPL-CCNC: 25 U/L (ref 2–50)
ANION GAP SERPL CALC-SCNC: 7 MMOL/L (ref 0–11.9)
AST SERPL-CCNC: 23 U/L (ref 12–45)
BASOPHILS # BLD AUTO: 0.5 % (ref 0–1.8)
BASOPHILS # BLD: 0.03 K/UL (ref 0–0.12)
BILIRUB SERPL-MCNC: 0.4 MG/DL (ref 0.1–1.5)
BUN SERPL-MCNC: 13 MG/DL (ref 8–22)
CALCIUM SERPL-MCNC: 8.7 MG/DL (ref 8.5–10.5)
CHLORIDE SERPL-SCNC: 101 MMOL/L (ref 96–112)
CO2 SERPL-SCNC: 27 MMOL/L (ref 20–33)
CREAT SERPL-MCNC: 0.51 MG/DL (ref 0.5–1.4)
EOSINOPHIL # BLD AUTO: 0.19 K/UL (ref 0–0.51)
EOSINOPHIL NFR BLD: 2.9 % (ref 0–6.9)
ERYTHROCYTE [DISTWIDTH] IN BLOOD BY AUTOMATED COUNT: 42.8 FL (ref 35.9–50)
GFR SERPL CREATININE-BSD FRML MDRD: >60 ML/MIN/1.73 M 2
GLOBULIN SER CALC-MCNC: 3.1 G/DL (ref 1.9–3.5)
GLUCOSE SERPL-MCNC: 118 MG/DL (ref 65–99)
HCT VFR BLD AUTO: 35.5 % (ref 37–47)
HGB BLD-MCNC: 11.9 G/DL (ref 12–16)
IMM GRANULOCYTES # BLD AUTO: 0.02 K/UL (ref 0–0.11)
IMM GRANULOCYTES NFR BLD AUTO: 0.3 % (ref 0–0.9)
LYMPHOCYTES # BLD AUTO: 2.31 K/UL (ref 1–4.8)
LYMPHOCYTES NFR BLD: 35.8 % (ref 22–41)
MCH RBC QN AUTO: 32 PG (ref 27–33)
MCHC RBC AUTO-ENTMCNC: 33.5 G/DL (ref 33.6–35)
MCV RBC AUTO: 95.4 FL (ref 81.4–97.8)
MONOCYTES # BLD AUTO: 0.58 K/UL (ref 0–0.85)
MONOCYTES NFR BLD AUTO: 9 % (ref 0–13.4)
NEUTROPHILS # BLD AUTO: 3.33 K/UL (ref 2–7.15)
NEUTROPHILS NFR BLD: 51.5 % (ref 44–72)
NRBC # BLD AUTO: 0 K/UL
NRBC BLD AUTO-RTO: 0 /100 WBC
PLATELET # BLD AUTO: 257 K/UL (ref 164–446)
PMV BLD AUTO: 9.9 FL (ref 9–12.9)
POTASSIUM SERPL-SCNC: 3.9 MMOL/L (ref 3.6–5.5)
PROT SERPL-MCNC: 6.4 G/DL (ref 6–8.2)
RBC # BLD AUTO: 3.72 M/UL (ref 4.2–5.4)
SODIUM SERPL-SCNC: 135 MMOL/L (ref 135–145)
WBC # BLD AUTO: 6.5 K/UL (ref 4.8–10.8)

## 2017-03-31 PROCEDURE — 97162 PT EVAL MOD COMPLEX 30 MIN: CPT

## 2017-03-31 PROCEDURE — 71010 DX-CHEST-LIMITED (1 VIEW): CPT

## 2017-03-31 PROCEDURE — 770001 HCHG ROOM/CARE - MED/SURG/GYN PRIV*

## 2017-03-31 PROCEDURE — G8979 MOBILITY GOAL STATUS: HCPCS | Mod: CI

## 2017-03-31 PROCEDURE — 80053 COMPREHEN METABOLIC PANEL: CPT

## 2017-03-31 PROCEDURE — A9270 NON-COVERED ITEM OR SERVICE: HCPCS | Performed by: SURGERY

## 2017-03-31 PROCEDURE — 700111 HCHG RX REV CODE 636 W/ 250 OVERRIDE (IP): Performed by: SURGERY

## 2017-03-31 PROCEDURE — 94668 MNPJ CHEST WALL SBSQ: CPT

## 2017-03-31 PROCEDURE — 94760 N-INVAS EAR/PLS OXIMETRY 1: CPT

## 2017-03-31 PROCEDURE — 99233 SBSQ HOSP IP/OBS HIGH 50: CPT | Performed by: SURGERY

## 2017-03-31 PROCEDURE — 85025 COMPLETE CBC W/AUTO DIFF WBC: CPT

## 2017-03-31 PROCEDURE — 700112 HCHG RX REV CODE 229: Performed by: SURGERY

## 2017-03-31 PROCEDURE — 700102 HCHG RX REV CODE 250 W/ 637 OVERRIDE(OP): Performed by: SURGERY

## 2017-03-31 PROCEDURE — G8978 MOBILITY CURRENT STATUS: HCPCS | Mod: CK

## 2017-03-31 RX ORDER — MORPHINE SULFATE 4 MG/ML
2 INJECTION, SOLUTION INTRAMUSCULAR; INTRAVENOUS
Status: DISCONTINUED | OUTPATIENT
Start: 2017-03-31 | End: 2017-04-01

## 2017-03-31 RX ADMIN — OXYCODONE HYDROCHLORIDE 10 MG: 5 TABLET ORAL at 00:39

## 2017-03-31 RX ADMIN — DOCUSATE SODIUM 100 MG: 100 CAPSULE ORAL at 09:41

## 2017-03-31 RX ADMIN — FAMOTIDINE 20 MG: 20 TABLET, FILM COATED ORAL at 09:41

## 2017-03-31 RX ADMIN — OXYCODONE HYDROCHLORIDE 10 MG: 5 TABLET ORAL at 11:37

## 2017-03-31 RX ADMIN — OXYCODONE HYDROCHLORIDE 10 MG: 5 TABLET ORAL at 07:27

## 2017-03-31 RX ADMIN — FAMOTIDINE 20 MG: 20 TABLET, FILM COATED ORAL at 20:10

## 2017-03-31 RX ADMIN — OXYCODONE HYDROCHLORIDE 10 MG: 5 TABLET ORAL at 20:10

## 2017-03-31 RX ADMIN — NICOTINE 14 MG: 14 PATCH TRANSDERMAL at 06:02

## 2017-03-31 RX ADMIN — POLYETHYLENE GLYCOL 3350 1 PACKET: 17 POWDER, FOR SOLUTION ORAL at 09:41

## 2017-03-31 RX ADMIN — METHADONE HYDROCHLORIDE 10 MG: 10 TABLET ORAL at 09:41

## 2017-03-31 RX ADMIN — ENOXAPARIN SODIUM 30 MG: 100 INJECTION SUBCUTANEOUS at 20:10

## 2017-03-31 RX ADMIN — ENOXAPARIN SODIUM 30 MG: 100 INJECTION SUBCUTANEOUS at 09:45

## 2017-03-31 RX ADMIN — OXYCODONE HYDROCHLORIDE 10 MG: 5 TABLET ORAL at 16:09

## 2017-03-31 ASSESSMENT — PAIN SCALES - GENERAL
PAINLEVEL_OUTOF10: 6
PAINLEVEL_OUTOF10: 10
PAINLEVEL_OUTOF10: 8
PAINLEVEL_OUTOF10: 5
PAINLEVEL_OUTOF10: 4
PAINLEVEL_OUTOF10: 6
PAINLEVEL_OUTOF10: 7
PAINLEVEL_OUTOF10: 4
PAINLEVEL_OUTOF10: 4
PAINLEVEL_OUTOF10: 8
PAINLEVEL_OUTOF10: 4
PAINLEVEL_OUTOF10: 5

## 2017-03-31 ASSESSMENT — LIFESTYLE VARIABLES
EVER HAD A DRINK FIRST THING IN THE MORNING TO STEADY YOUR NERVES TO GET RID OF A HANGOVER: NO
ON A TYPICAL DAY WHEN YOU DRINK ALCOHOL HOW MANY DRINKS DO YOU HAVE: 0
EVER FELT BAD OR GUILTY ABOUT YOUR DRINKING: NO
TOTAL SCORE: 0
TOTAL SCORE: 0
HAVE YOU EVER FELT YOU SHOULD CUT DOWN ON YOUR DRINKING: NO
CONSUMPTION TOTAL: POSITIVE
TOTAL SCORE: 0
TOTAL SCORE: 0
HAVE PEOPLE ANNOYED YOU BY CRITICIZING YOUR DRINKING: NO
HOW MANY TIMES IN THE PAST YEAR HAVE YOU HAD 5 OR MORE DRINKS IN A DAY: 5
ALCOHOL_USE: YES
DO YOU DRINK ALCOHOL: YES
ON A TYPICAL DAY WHEN YOU DRINK ALCOHOL HOW MANY DRINKS DO YOU HAVE: 0
EVER HAD A DRINK FIRST THING IN THE MORNING TO STEADY YOUR NERVES TO GET RID OF A HANGOVER: NO
HAVE YOU EVER FELT YOU SHOULD CUT DOWN ON YOUR DRINKING: NO
TOTAL SCORE: 0
HAVE PEOPLE ANNOYED YOU BY CRITICIZING YOUR DRINKING: NO
HAVE YOU EVER FELT YOU SHOULD CUT DOWN ON YOUR DRINKING: NO
CONSUMPTION TOTAL: INCOMPLETE
TOTAL SCORE: 0
TOTAL SCORE: 0
AVERAGE NUMBER OF DAYS PER WEEK YOU HAVE A DRINK CONTAINING ALCOHOL: 2
EVER_SMOKED: YES
CONSUMPTION TOTAL: POSITIVE
EVER HAD A DRINK FIRST THING IN THE MORNING TO STEADY YOUR NERVES TO GET RID OF A HANGOVER: NO
TOTAL SCORE: 0
AVERAGE NUMBER OF DAYS PER WEEK YOU HAVE A DRINK CONTAINING ALCOHOL: 2
EVER FELT BAD OR GUILTY ABOUT YOUR DRINKING: NO
DO YOU DRINK ALCOHOL: YES
AVERAGE NUMBER OF DAYS PER WEEK YOU HAVE A DRINK CONTAINING ALCOHOL: 2
HOW MANY TIMES IN THE PAST YEAR HAVE YOU HAD 5 OR MORE DRINKS IN A DAY: 5
HAVE PEOPLE ANNOYED YOU BY CRITICIZING YOUR DRINKING: NO
EVER_SMOKED: YES
EVER FELT BAD OR GUILTY ABOUT YOUR DRINKING: NO
TOTAL SCORE: 0
HOW MANY TIMES IN THE PAST YEAR HAVE YOU HAD 5 OR MORE DRINKS IN A DAY: 5

## 2017-03-31 ASSESSMENT — COPD QUESTIONNAIRES
HAVE YOU SMOKED AT LEAST 100 CIGARETTES IN YOUR ENTIRE LIFE: YES
DURING THE PAST 4 WEEKS HOW MUCH DID YOU FEEL SHORT OF BREATH: SOME OF THE TIME
DO YOU EVER COUGH UP ANY MUCUS OR PHLEGM?: NO/ONLY WITH OCCASIONAL COLDS OR INFECTIONS

## 2017-03-31 ASSESSMENT — ENCOUNTER SYMPTOMS
FOCAL WEAKNESS: 0
FEVER: 0
BACK PAIN: 1
CHILLS: 0
HEADACHES: 0
NAUSEA: 0
VOMITING: 0
MYALGIAS: 1
ABDOMINAL PAIN: 0
SPEECH CHANGE: 0
BLURRED VISION: 1
SHORTNESS OF BREATH: 1

## 2017-03-31 ASSESSMENT — GAIT ASSESSMENTS
GAIT LEVEL OF ASSIST: CONTACT GUARD ASSIST
DISTANCE (FEET): 40
DEVIATION: ANTALGIC;DECREASED BASE OF SUPPORT
ASSISTIVE DEVICE: FRONT WHEEL WALKER

## 2017-03-31 NOTE — CARE PLAN
Problem: Respiratory:  Goal: Respiratory status will improve  Encourage use of is q1h while awake.  Instruct in use of yankour for self-suctioning.  Continuously monitor o2 sat.    encourage to turn cough and deep breathe

## 2017-03-31 NOTE — FLOWSHEET NOTE
03/31/17 1245   Events/Summary/Plan   Events/Summary/Plan IS and PEP done in a chair with fair effort.  Tolerated well.  No distress at this time.   Interdisciplinary Plan of Care-Goals (Indications)   Hyperinflation Protocol Indications Chest Trauma (follow Blunt Chest Protocol)   Interdisciplinary Plan of Care-Outcomes    Hyperinflation Protocol Goals/Outcome Stable Vital Capacity x24 hrs and Patient Understands / uses I.S.   Education   Education Yes - Pt. / Family has been Instructed in use of Respiratory Equipment   PEP/CPT Group   PEP/CPT/Airway Clearance Therapy Yes   #PEP/CPT (Manual) Subsequent Subsequent   PEP/CPT Method Positive Airway Pressure Device   CPT Settings Yes   Pressure 5   Incentive Spirometry Group   Incentive Spirometry Instruction Yes   Breathing Exercises Yes   Incentive Spirometer Volume 56836 mL   Chest Exam   Chest Observation Flail Chest Right   Work Of Breathing / Effort Mild   Respiration 17   Pulse 73   Heart Rate (Monitored) 73   Breath Sounds   RUL Breath Sounds Clear   RML Breath Sounds Clear   RLL Breath Sounds Diminished   LUIS M Breath Sounds Clear   LLL Breath Sounds Diminished   Secretions   Cough Moist;Non Productive   How Sputum Obtained Cough on Request   Sputum Amount Unable to Evaluate   Sputum Color Unable to Evaluate   Sputum Consistency Unable to Evaluate   Oxygen   Pulse Oximetry 98 %   O2 (LPM) 2   O2 Daily Delivery Respiratory  Silicone Nasal Cannula

## 2017-03-31 NOTE — PROGRESS NOTES
Problem: risk for impaired skin integrity   Goal: maintain skin integrity throughout duration of pt stay.   Outcome: progressing as expected  -paco scale completed q shift, see CCU obs  -Pt turned q2 hours and documented, pillows used for repositioning  -pt assessed for skin breakdown, any abnormalities recorded in wound flowsheet      pts pain will be assessed q 2 hour and treated accordingly to ordered medications. PT will be reassessed in a timely manner and appropriate action taken. Pt will use 0-10 pain score and set a comfort score for themselves at the beginning of the shift    Pt assessed every 15 minutes for close observation precautions

## 2017-03-31 NOTE — PROGRESS NOTES
"  Trauma/Surgical Progress Note    Author: Curt Esposito / Fortino Felipe MD Date & Time created: 3/31/2017   10:28 AM     Interval Events:    Tertiary exam completed. Legal hold in place.  Up to chair. Adequate pain control. Room air.  Clinically stable at this time, transfer to [facility] with sitter and suicide precautions.  Counseled     Review of Systems   Constitutional: Negative for fever and chills.   HENT: Negative for hearing loss.    Eyes: Positive for blurred vision (left eye).   Respiratory: Positive for shortness of breath.    Cardiovascular: Negative for chest pain.   Gastrointestinal: Negative for nausea, vomiting and abdominal pain.        3/30  Last BM   Genitourinary: Negative for dysuria.   Musculoskeletal: Positive for myalgias, back pain and joint pain.        Rib fracture pain    Skin: Negative for rash.   Neurological: Negative for speech change, focal weakness and headaches.   Psychiatric/Behavioral:        Denies suicidal ideations at this time     Hemodynamics:  Blood pressure 135/90, pulse 71, temperature 36.1 °C (97 °F), resp. rate 17, height 1.626 m (5' 4\"), weight 74 kg (163 lb 2.3 oz), SpO2 96 %.     Respiratory:    Respiration: 17, Pulse Oximetry: 96 %, O2 Daily Delivery Respiratory : Silicone Nasal Cannula     PEP/CPT Method: Positive Airway Pressure Device, Work Of Breathing / Effort: Mild  RUL Breath Sounds: Clear (after coughing), RML Breath Sounds: Clear, RLL Breath Sounds: Diminished, LUIS M Breath Sounds: Clear (after coughing), LLL Breath Sounds: Diminished  Fluids:    Intake/Output Summary (Last 24 hours) at 03/31/17 1028  Last data filed at 03/31/17 0600   Gross per 24 hour   Intake   1430 ml   Output   1850 ml   Net   -420 ml     Admit Weight: 74.844 kg (165 lb)  Current Weight: 74 kg (163 lb 2.3 oz)    Physical Exam   Constitutional: She is oriented to person, place, and time. She appears well-developed and well-nourished.   HENT:   Head: Normocephalic.   Eyes: Conjunctivae are " normal.   Left periorbital ecchymosis    Neck: Neck supple. No JVD present.   Cardiovascular: Normal rate and regular rhythm.    Pulmonary/Chest: Effort normal. No respiratory distress. She exhibits tenderness (Rib fracture pain ).   IS 1000 -1250 cc   Abdominal: Soft. She exhibits no distension. There is no tenderness.   Musculoskeletal: Normal range of motion. She exhibits edema and tenderness.   Left ankle pain / swelling   Neurological: She is alert and oriented to person, place, and time.   Skin: Skin is warm and dry. She is not diaphoretic.   Nursing note and vitals reviewed.      Medical Decision Making/Problem List:    Active Hospital Problems    Diagnosis   • Suicidal behavior [R46.89]     Priority: High     Possible fall was intentional  Legal 2000 placed by police  3/28 Legal hold extended  Mabel Simpson MD. Psychiatry      • Closed fracture of multiple ribs of left side [S22.42XA]     Priority: High     Left second through ninth rib fractures with flail segment of third and fourth ribs  Aggressive pulmonary hygiene, pain control and radiographic studies     • Multiple facial fractures (CMS-HCC) [S02.92XA]     Priority: Medium     3/25 Maxillofacial CT -Comminuted fracture of lateral wall of left maxillary sinus.   - Fracture of anterolateral wall of the left maxillary sinus extending into inferior and lateral wall of left orbit which is slightly displaced.   - Left zygomatic arch fracture.  Nonoperative management at this time. Follow up after swelling decreases.  Blayne Wilhelm MD - Facial fractures     • Splenic laceration [S36.039A]     Priority: Medium     3/25 CT imaging with Grade III splenic laceration. Splenic fracture involving hilum with hazy central density suggesting active extravasation  Trend abdominal exam and laboratory studies     • Closed fracture of lumbar vertebra (HCC) [S32.009A]     Priority: Medium     3/25 CT imaging - Left second through sixth transverse process fractures.    -  Bony projection into spinal canal at T7 resulting component of canal narrowing.  - Sclerosis and slight expansion of the posterior elements of T1 and C7.   MRI spine most consistent with an old calcified disc, incidental finding from the trauma presentation  Nonoperative management. No bracing needed  Follow up in 8 weeks with repeat MRI imaging  Damián Felipe MD. Neurosurgery     • Acute left ankle pain [M25.572]     Priority: Low     Swelling and ecchymosis   3/30 Diagnostic imaging with soft tissue swelling and no acute fracture identified     • Diplopia [H53.2]     Priority: Low     Complaining of visual disturbance  Likely secondary to facial/orbital fractures and orbital edema vs decompensated phoria  Nonoperative management: Patch either eye  Re-check in office 1 month.  Haim Hernandez MD. Opthalomology     • Traumatic pneumothorax [S27.0XXA]     Priority: Low     3/25 CT imaging with a small left anterior and lung base pneumothorax  3/31 CXR no pneumothorax     • Alcohol intoxication (CMS-HCC) [F10.129]     Priority: Low     Admit BA .22  SBIRT completed     • Inadequate anticoagulation [Z51.81, Z79.01]     Priority: Low     Prophylactic Lovenox initially contraindicated due to elevated bleeding risk from spleen injury.  RAP score  3/27  Lovenox initiated   Lower extremity trauma sonogram as clinically indicated        Core Measures & Quality Metrics:  Labs reviewed, Medications reviewed and Radiology images reviewed  Walden catheter: No Walden      DVT Prophylaxis: Enoxaparin (Lovenox)  DVT prophylaxis - mechanical: SCDs  Ulcer prophylaxis: Yes    Assessed for rehab: Patient was assess for and/or received rehabilitation services during this hospitalization    Total Score: 8    ETOH Screening  CAGE Score: 0  Intervention complete date: 3/31/2017  Patient response to intervention: Denies illicit drug/alcohol abuse. Smokes 1 pack of cigarettes a day. .   Patient demonstrats understanding of intervention.Plan of  care: patient refused substance abuse cessation resources     has not been contacted.Follow up with: PCP and Clinic  Total ETOH intervention time: 15 - 30 mintues    Discussed patient condition with RN, Patient and trauma surgery, Dr.John Felipe.    Patient seen, data reviewed and discussed.  Agree with assessment and plan.  GARIMA

## 2017-03-31 NOTE — THERAPY
"Physical Therapy Evaluation completed.   Bed Mobility:     Transfers: Sit to Stand: Contact Guard Assist  Gait: Level Of Assist: Contact Guard Assist with Front-Wheel Walker       Plan of Care: Will benefit from Physical Therapy 3 times per week  Discharge Recommendations: Equipment: Will Continue to Assess for Equipment Needs. Post-acute therapy Discharge to a transitional care facility for continued skilled therapy services. and Discharge to home with outpatient or home health for additional skilled therapy services.    Pt is a pleasant 45 yo F who had fallen down some stairs resulting in L rib Fx's 2-9 with flailed ribs at 3 and 4.  Pt also sustained thoracic spine transverse process Fx's at T2-6.  Pt presents upon assessment with generalized weakness exacerbated by increased pain.  Pt also presents with decreased balance and activity tolerance.  Pt required use of FWW with CGA for transfers and gait x 40'.   Pt will continue to benefit from skilled PT while here at [facility] to increase functional mobility and independence.    See \"Rehab Therapy-Acute\" Patient Summary Report for complete documentation.     "

## 2017-03-31 NOTE — CARE PLAN
Problem: Pain Management  Goal: Pain level will decrease to patient’s comfort goal  Outcome: PROGRESSING AS EXPECTED  pts pain will be assessed q 2 hour and treated accordingly to ordered medications. PT will be reassessed in a timely manner and appropriate action taken. Pt will use 0-10 pain score and set a comfort score for themselves at the beginning of the shift    Problem: Skin Integrity  Goal: Risk for impaired skin integrity will decrease  Outcome: PROGRESSING AS EXPECTED  Problem: risk for impaired skin integrity   Goal: maintain skin integrity throughout duration of pt stay.   Outcome: progressing as expected  -paco scale completed q shift, see CCU obs  -Pt turned q2 hours and documented, pillows used for repositioning  -pt assessed for skin breakdown, any abnormalities recorded in wound flowsheet

## 2017-03-31 NOTE — PROGRESS NOTES
"Received report from SNADY Cosby and assumed care of patient upon arrival to [facility] in [facility].  Assessment complete; discussed POC with patient.  AO x4, patient calls for assistance.  Patient appears calm and exhibits no signs of distress.  Patient reports pain of 8/10, medicating per MAR PRN.  Patient tolerating current diet.  BS normoactive x 4 LBM 3/30/17, patient voids ambulating to bathroom.  Patient is standby assist with FWW or handheld assist, gait shuffling.  Call light within reach, bed in lowest position, SCDs in use, bed alarm refused, 1:1 sitter in place for legal hold.  Will continue to monitor pt for changes in status and provide care.    Pt has been allowed phone and visitor privileges, denied any attempt at self-harm and verbalized that she \"fell out of the window\" after falling asleep.  Pt demeanor appears positive and optimistic.  Laughing and making jokes with staff.  Pt's primary concern at this point is pain management.  Pt verbalized request to shower.  Will plan care for shower in a safe manner for patient.  "

## 2017-03-31 NOTE — CARE PLAN
Problem: Mobility  Goal: Risk for activity intolerance will decrease  Encourage progressive mobilization qs.  Provide for periods of rest.  Pillows for support and positioning

## 2017-03-31 NOTE — CARE PLAN
Problem: Infection  Goal: Will remain free from infection  Outcome: PROGRESSING AS EXPECTED  Reminded patient of the signs and symptoms of infection and encouraged patient to report any of these findings in order to promote best outcomes.    Problem: Pain Management  Goal: Pain level will decrease to patient’s comfort goal  Outcome: PROGRESSING AS EXPECTED  Assessed pain and medicated per MAR.  Reminded patient to report any changes in severity or quality of pain in order to best manage pain.    Problem: Psychosocial Needs:  Goal: Level of anxiety will decrease  Outcome: PROGRESSING AS EXPECTED  Decreased stimuli, dimmed lighting, provided emotional support, reduced noise levels and minimized interruptions.

## 2017-04-01 ENCOUNTER — APPOINTMENT (OUTPATIENT)
Dept: RADIOLOGY | Facility: MEDICAL CENTER | Age: 47
DRG: 964 | End: 2017-04-01
Attending: SURGERY
Payer: COMMERCIAL

## 2017-04-01 PROCEDURE — A9270 NON-COVERED ITEM OR SERVICE: HCPCS | Performed by: SURGERY

## 2017-04-01 PROCEDURE — 700102 HCHG RX REV CODE 250 W/ 637 OVERRIDE(OP): Performed by: SURGERY

## 2017-04-01 PROCEDURE — 71010 DX-CHEST-LIMITED (1 VIEW): CPT

## 2017-04-01 PROCEDURE — 770001 HCHG ROOM/CARE - MED/SURG/GYN PRIV*

## 2017-04-01 PROCEDURE — 94760 N-INVAS EAR/PLS OXIMETRY 1: CPT

## 2017-04-01 PROCEDURE — 700111 HCHG RX REV CODE 636 W/ 250 OVERRIDE (IP): Performed by: SURGERY

## 2017-04-01 PROCEDURE — A9270 NON-COVERED ITEM OR SERVICE: HCPCS | Performed by: NURSE PRACTITIONER

## 2017-04-01 PROCEDURE — 94668 MNPJ CHEST WALL SBSQ: CPT

## 2017-04-01 PROCEDURE — 700102 HCHG RX REV CODE 250 W/ 637 OVERRIDE(OP): Performed by: NURSE PRACTITIONER

## 2017-04-01 PROCEDURE — 700112 HCHG RX REV CODE 229: Performed by: SURGERY

## 2017-04-01 RX ORDER — OXYCODONE HYDROCHLORIDE 10 MG/1
10 TABLET ORAL
Status: DISCONTINUED | OUTPATIENT
Start: 2017-04-01 | End: 2017-04-06 | Stop reason: HOSPADM

## 2017-04-01 RX ORDER — MORPHINE SULFATE 4 MG/ML
2 INJECTION, SOLUTION INTRAMUSCULAR; INTRAVENOUS
Status: DISCONTINUED | OUTPATIENT
Start: 2017-04-01 | End: 2017-04-03

## 2017-04-01 RX ORDER — OXYCODONE HYDROCHLORIDE 5 MG/1
5 TABLET ORAL
Status: DISCONTINUED | OUTPATIENT
Start: 2017-04-01 | End: 2017-04-06 | Stop reason: HOSPADM

## 2017-04-01 RX ADMIN — MAGNESIUM HYDROXIDE 30 ML: 400 SUSPENSION ORAL at 07:59

## 2017-04-01 RX ADMIN — OXYCODONE HYDROCHLORIDE 10 MG: 10 TABLET ORAL at 07:58

## 2017-04-01 RX ADMIN — OXYCODONE HYDROCHLORIDE 10 MG: 5 TABLET ORAL at 05:43

## 2017-04-01 RX ADMIN — METHADONE HYDROCHLORIDE 10 MG: 10 TABLET ORAL at 07:58

## 2017-04-01 RX ADMIN — FAMOTIDINE 20 MG: 20 TABLET, FILM COATED ORAL at 07:59

## 2017-04-01 RX ADMIN — OXYCODONE HYDROCHLORIDE 10 MG: 10 TABLET ORAL at 20:08

## 2017-04-01 RX ADMIN — STANDARDIZED SENNA CONCENTRATE AND DOCUSATE SODIUM 1 TABLET: 8.6; 5 TABLET, FILM COATED ORAL at 19:57

## 2017-04-01 RX ADMIN — ENOXAPARIN SODIUM 30 MG: 100 INJECTION SUBCUTANEOUS at 07:59

## 2017-04-01 RX ADMIN — ENOXAPARIN SODIUM 30 MG: 100 INJECTION SUBCUTANEOUS at 19:57

## 2017-04-01 RX ADMIN — OXYCODONE HYDROCHLORIDE 10 MG: 10 TABLET ORAL at 16:16

## 2017-04-01 RX ADMIN — DOCUSATE SODIUM 100 MG: 100 CAPSULE ORAL at 07:59

## 2017-04-01 RX ADMIN — OXYCODONE HYDROCHLORIDE 10 MG: 5 TABLET ORAL at 00:21

## 2017-04-01 RX ADMIN — FAMOTIDINE 20 MG: 20 TABLET, FILM COATED ORAL at 19:57

## 2017-04-01 RX ADMIN — POLYETHYLENE GLYCOL 3350 1 PACKET: 17 POWDER, FOR SOLUTION ORAL at 19:56

## 2017-04-01 RX ADMIN — DOCUSATE SODIUM 100 MG: 100 CAPSULE ORAL at 19:57

## 2017-04-01 RX ADMIN — POLYETHYLENE GLYCOL 3350 1 PACKET: 17 POWDER, FOR SOLUTION ORAL at 07:59

## 2017-04-01 ASSESSMENT — ENCOUNTER SYMPTOMS
NECK PAIN: 0
NAUSEA: 0
FEVER: 0
HEADACHES: 0
FOCAL WEAKNESS: 0
COUGH: 0
ABDOMINAL PAIN: 0
BLURRED VISION: 1
DIZZINESS: 0
VOMITING: 0
BACK PAIN: 1
ROS GI COMMENTS: 3/30
DOUBLE VISION: 0
CHILLS: 0
SHORTNESS OF BREATH: 1
MYALGIAS: 1
DIAPHORESIS: 0
SENSORY CHANGE: 0

## 2017-04-01 ASSESSMENT — PAIN SCALES - GENERAL
PAINLEVEL_OUTOF10: 7
PAINLEVEL_OUTOF10: 10
PAINLEVEL_OUTOF10: 9
PAINLEVEL_OUTOF10: 3

## 2017-04-01 NOTE — CARE PLAN
Problem: Hyperinflation:  Goal: Prevent or improve atelectasis  Outcome: PROGRESSING AS EXPECTED  Intervention: Instruct incentive spirometry usage  60% 1410 best IS value 1250  Intervention: Perform hyperinflation therapy as indicated by assessment  PEP QID

## 2017-04-01 NOTE — PROGRESS NOTES
Bedside report completed. Pt has no complaints at this time. Pt AAOx4. Sitter at bedside. CNA and RN numbers given to pt. Bed is locked and in lowest position. Hourly rounding in place.

## 2017-04-01 NOTE — PROGRESS NOTES
"  Trauma/Surgical Progress Note    Author: Brenna Marx Date & Time created: 4/1/2017   7:17 AM     Interval Events:    Labile mood and behavior.  Pain is an issue. Increased frequency of Oxycodone.  AM chest xray stable.  Legal hold per psychiatry - Sitter.    Review of Systems   Constitutional: Positive for malaise/fatigue. Negative for fever, chills and diaphoresis.   HENT: Negative.    Eyes: Positive for blurred vision (left eye). Negative for double vision.   Respiratory: Positive for shortness of breath. Negative for cough.    Cardiovascular: Positive for chest pain (rib fractures). Negative for leg swelling.   Gastrointestinal: Negative for nausea, vomiting and abdominal pain.        3/30   Genitourinary: Negative for dysuria.        Voiding   Musculoskeletal: Positive for myalgias, back pain and joint pain. Negative for neck pain.        Right rib cage, mid back pain.  Left ankle mild pain.   Skin: Negative.  Negative for itching and rash.   Neurological: Negative for dizziness, sensory change, focal weakness and headaches.   Psychiatric/Behavioral:        Tearful, denies suicidal ideas, reports her roommate committed suicide this week.     Hemodynamics:  Blood pressure 133/74, pulse 76, temperature 36.1 °C (97 °F), resp. rate 16, height 1.626 m (5' 4\"), weight 74 kg (163 lb 2.3 oz), last menstrual period 03/25/2017, SpO2 93 %, not currently breastfeeding.     Respiratory:    Respiration: 16, Pulse Oximetry: 93 %, O2 Daily Delivery Respiratory : Room Air with O2 Available     PEP/CPT Method: Positive Airway Pressure Device, Work Of Breathing / Effort: Mild  RUL Breath Sounds: Clear, RML Breath Sounds: Clear, RLL Breath Sounds: Diminished, LUIS M Breath Sounds: Clear, LLL Breath Sounds: Diminished  Fluids:    Intake/Output Summary (Last 24 hours) at 04/01/17 0717  Last data filed at 04/01/17 0230   Gross per 24 hour   Intake      0 ml   Output   2650 ml   Net  -2650 ml     Admit Weight: 74.844 kg (165 " lb)  Current      Physical Exam   Constitutional: She is oriented to person, place, and time. She appears well-nourished. She appears distressed.   HENT:   Head: Normocephalic.   Left periorbital ecchymosis. Minimal swelling.   Eyes: Conjunctivae and EOM are normal.   Neck: Neck supple. No JVD present. No tracheal deviation present.   Cardiovascular: Normal rate, regular rhythm and intact distal pulses.    No murmur heard.  Wears glasses.    Pulmonary/Chest: Effort normal and breath sounds normal. No respiratory distress. She exhibits tenderness (left rib fractures).   Abdominal: Soft. Bowel sounds are normal. She exhibits no distension. There is tenderness. There is no rebound.   Left abdominal / flank bruising - tender.   Musculoskeletal: Normal range of motion. She exhibits edema (mild edema left ankle) and tenderness (left torso, flank, and hip. Mild left ankle tenderness.).   Neurological: She is alert and oriented to person, place, and time.   Skin: Skin is warm and dry. She is not diaphoretic. No erythema. No pallor.   Psychiatric: Her affect is labile.   Labile mood.   Nursing note and vitals reviewed.      Medical Decision Making/Problem List:    Active Hospital Problems    Diagnosis   • Suicidal behavior [R46.89]     Priority: High     Possible fall was intentional  Legal 2000 placed by police  3/28 Legal hold extended.  Mabel Simpson MD. Psychiatry      • Closed fracture of multiple ribs of left side [S22.42XA]     Priority: High     Left second through ninth rib fractures with flail segment of third and fourth ribs  Aggressive pulmonary hygiene, pain control and radiographic studies.     • Multiple facial fractures (CMS-HCC) [S02.92XA]     Priority: Medium     3/25 Maxillofacial CT -Comminuted fracture of lateral wall of left maxillary sinus.   - Fracture of anterolateral wall of the left maxillary sinus extending into inferior and lateral wall of left orbit which is slightly displaced.   - Left  zygomatic arch fracture.  Nonoperative management at this time. Follow up after swelling decreases.  Blayne Wilhelm MD - Facial Surgeon.     • Splenic laceration [S36.039A]     Priority: Medium     3/25 CT imaging with Grade III splenic laceration. Splenic fracture involving hilum with hazy central density suggesting active extravasation.  Trend abdominal exam and laboratory studies.     • Closed fracture of thoracic vertebra (CMS-[facility]) [S22.009A]     Priority: Medium     3/25 CT imaging - Left second through sixth transverse process fractures.    - Bony projection into spinal canal at T7 resulting component of canal narrowing.  - Sclerosis and slight expansion of the posterior elements of T1 and C7.   MRI spine most consistent with an old calcified disc, incidental finding from the trauma presentation  Nonoperative management. No bracing needed.  Follow up in 8 weeks with repeat MRI imaging  Damián Felipe MD. Neurosurgery     • Acute left ankle pain [M25.572]     Priority: Low     Swelling and ecchymosis   3/30 Diagnostic imaging with soft tissue swelling and no acute fracture identified.     • Diplopia [H53.2]     Priority: Low     Complaining of visual disturbance.  Likely secondary to facial/orbital fractures and orbital edema vs decompensated phoria  Nonoperative management: Patch either eye.  Re-check in office 1 month.  Haim Hernandez MD. Opthalomology     • Traumatic pneumothorax [S27.0XXA]     Priority: Low     3/25 CT imaging with a small left anterior and lung base pneumothorax  4/1 CXR Persistent left basilar opacity, consistent with a combination of airspace disease, atelectasis, and pleural effusion. No pneumothorax.     • Alcohol intoxication (CMS-[facility]) [F10.129]     Priority: Low     Admit BA .22  SBIRT completed.     • Inadequate anticoagulation [Z51.81, Z79.01]     Priority: Low     Prophylactic Lovenox initially contraindicated due to elevated bleeding risk from spleen injury.  RAP score 8  3/27   Lovenox initiated   Lower extremity trauma sonogram as clinically indicated.       Core Measures & Quality Metrics:  Labs reviewed, Medications reviewed and Radiology images reviewed  Walden catheter: No Walden      DVT Prophylaxis: Enoxaparin (Lovenox)  DVT prophylaxis - mechanical: SCDs  Ulcer prophylaxis: Yes    Assessed for rehab: Patient was assess for and/or received rehabilitation services during this hospitalization    Total Score: 8  ETOH Screening  CAGE Score: 0  Intervention complete date: 3/31/2017  Patient response to intervention: Denies illicit drug/alcohol abuse. Smokes 1 pack of cigarettes a day. .   Patient demonstrats understanding of intervention.Plan of care: patient refused substance abuse cessation resources     has not been contacted.Follow up with: PCP and Clinic  Total ETOH intervention time: 15 - 30 mintues    Discussed patient condition with RN, Patient and trauma surgery Dr Fortino Felipe.    Patient seen, data reviewed and discussed.  Agree with assessment and plan.  GARIMA

## 2017-04-01 NOTE — CARE PLAN
Problem: Safety  Goal: Will remain free from injury  Outcome: PROGRESSING AS EXPECTED  1:1 sitter at bedside. Pt verbalized no plan for hurting self or others    Problem: Venous Thromboembolism (VTW)/Deep Vein Thrombosis (DVT) Prevention:  Goal: Patient will participate in Venous Thrombosis (VTE)/Deep Vein Thrombosis (DVT)Prevention Measures  Outcome: PROGRESSING AS EXPECTED  lovenox given per MAR    Problem: Psychosocial Needs:  Goal: Level of anxiety will decrease  Outcome: PROGRESSING AS EXPECTED  Pt grimaces in pain. Pt appears to be depressed.

## 2017-04-01 NOTE — PROGRESS NOTES
Shift note:  Assumed care of pt at 0700  Pt. was seen by APN at bedside  Pt grimacing in pain  Medicated with oxycodone oral prn per MAR  Assisted to bathroom with FWW, gait unsteady  Bruising noted to left rib cage and left flank  IV Saline lock  1:1 Sitter at bedside  Pt states she does not have plan on self harm  POC discussed

## 2017-04-02 ENCOUNTER — APPOINTMENT (OUTPATIENT)
Dept: RADIOLOGY | Facility: MEDICAL CENTER | Age: 47
DRG: 964 | End: 2017-04-02
Attending: SURGERY
Payer: COMMERCIAL

## 2017-04-02 PROCEDURE — A9270 NON-COVERED ITEM OR SERVICE: HCPCS | Performed by: SURGERY

## 2017-04-02 PROCEDURE — 700111 HCHG RX REV CODE 636 W/ 250 OVERRIDE (IP): Performed by: SURGERY

## 2017-04-02 PROCEDURE — A9270 NON-COVERED ITEM OR SERVICE: HCPCS | Performed by: NURSE PRACTITIONER

## 2017-04-02 PROCEDURE — 700102 HCHG RX REV CODE 250 W/ 637 OVERRIDE(OP): Performed by: NURSE PRACTITIONER

## 2017-04-02 PROCEDURE — 700102 HCHG RX REV CODE 250 W/ 637 OVERRIDE(OP): Performed by: SURGERY

## 2017-04-02 PROCEDURE — 770001 HCHG ROOM/CARE - MED/SURG/GYN PRIV*

## 2017-04-02 PROCEDURE — 71010 DX-CHEST-LIMITED (1 VIEW): CPT

## 2017-04-02 PROCEDURE — 700112 HCHG RX REV CODE 229: Performed by: SURGERY

## 2017-04-02 RX ORDER — METHADONE HYDROCHLORIDE 10 MG/1
5 TABLET ORAL DAILY
Status: DISCONTINUED | OUTPATIENT
Start: 2017-04-02 | End: 2017-04-03

## 2017-04-02 RX ADMIN — OXYCODONE HYDROCHLORIDE 10 MG: 10 TABLET ORAL at 04:15

## 2017-04-02 RX ADMIN — FAMOTIDINE 20 MG: 20 TABLET, FILM COATED ORAL at 07:53

## 2017-04-02 RX ADMIN — ENOXAPARIN SODIUM 30 MG: 100 INJECTION SUBCUTANEOUS at 20:49

## 2017-04-02 RX ADMIN — POLYETHYLENE GLYCOL 3350 1 PACKET: 17 POWDER, FOR SOLUTION ORAL at 07:53

## 2017-04-02 RX ADMIN — OXYCODONE HYDROCHLORIDE 10 MG: 10 TABLET ORAL at 16:28

## 2017-04-02 RX ADMIN — METHADONE HYDROCHLORIDE 5 MG: 10 TABLET ORAL at 09:29

## 2017-04-02 RX ADMIN — DOCUSATE SODIUM 100 MG: 100 CAPSULE ORAL at 07:53

## 2017-04-02 RX ADMIN — OXYCODONE HYDROCHLORIDE 10 MG: 10 TABLET ORAL at 07:53

## 2017-04-02 RX ADMIN — OXYCODONE HYDROCHLORIDE 10 MG: 10 TABLET ORAL at 20:49

## 2017-04-02 RX ADMIN — ENOXAPARIN SODIUM 30 MG: 100 INJECTION SUBCUTANEOUS at 07:53

## 2017-04-02 RX ADMIN — OXYCODONE HYDROCHLORIDE 10 MG: 10 TABLET ORAL at 11:48

## 2017-04-02 RX ADMIN — FAMOTIDINE 20 MG: 20 TABLET, FILM COATED ORAL at 20:49

## 2017-04-02 RX ADMIN — OXYCODONE HYDROCHLORIDE 10 MG: 10 TABLET ORAL at 00:58

## 2017-04-02 RX ADMIN — MAGNESIUM HYDROXIDE 30 ML: 400 SUSPENSION ORAL at 07:53

## 2017-04-02 ASSESSMENT — ENCOUNTER SYMPTOMS
MYALGIAS: 1
EYE PAIN: 0
FEVER: 0
HEADACHES: 0
NECK PAIN: 0
EYE REDNESS: 0
NAUSEA: 0
CHILLS: 0
BACK PAIN: 1
VOMITING: 0
BLURRED VISION: 1
SHORTNESS OF BREATH: 0
COUGH: 0
ROS GI COMMENTS: BM 3/30
DIZZINESS: 0
DOUBLE VISION: 0
FOCAL WEAKNESS: 0
ABDOMINAL PAIN: 0
SENSORY CHANGE: 0

## 2017-04-02 ASSESSMENT — PAIN SCALES - GENERAL
PAINLEVEL_OUTOF10: 9
PAINLEVEL_OUTOF10: 5
PAINLEVEL_OUTOF10: 6
PAINLEVEL_OUTOF10: 9

## 2017-04-02 NOTE — PROGRESS NOTES
"  Trauma/Surgical Progress Note    Author: Brenna Marx Date & Time created: 4/2/2017   11:56 AM     Interval Events:    Doing well today. Adequate pain control.   Weaning off methadone.  CXR stable.  OOB and ambulating in hallway.  Continue legal hold / sitter. Repeatedly denies suicidal ideations.  Psychiatry to re-evaluate.    Review of Systems   Constitutional: Negative for fever and chills.   HENT: Negative.         Jaw pain   Eyes: Positive for blurred vision (left eye - improving ). Negative for double vision, pain and redness.        Wears glasses   Respiratory: Negative for cough and shortness of breath.    Cardiovascular: Positive for chest pain (rib fractures). Negative for leg swelling.   Gastrointestinal: Negative for nausea, vomiting and abdominal pain.        BM 3/30   Genitourinary: Negative.         Voiding   Musculoskeletal: Positive for myalgias, back pain and joint pain. Negative for neck pain.        Right rib cage, mid back pain.  Left ankle mild pain.   Skin: Negative.    Neurological: Negative for dizziness, sensory change, focal weakness and headaches.   Psychiatric/Behavioral: Negative for suicidal ideas.        Denies suicidal ideas.     Hemodynamics:  Blood pressure 114/75, pulse 68, temperature 36.3 °C (97.4 °F), resp. rate 18, height 1.626 m (5' 4\"), weight 74 kg (163 lb 2.3 oz), last menstrual period 03/25/2017, SpO2 95 %, not currently breastfeeding.     Respiratory:    Respiration: 18, Pulse Oximetry: 95 %, O2 Daily Delivery Respiratory : Room Air with O2 Available     PEP/CPT Method: Positive Airway Pressure Device, Work Of Breathing / Effort: Mild;Shallow  RUL Breath Sounds: Clear, RML Breath Sounds: Clear, RLL Breath Sounds: Diminished, LUIS M Breath Sounds: Clear, LLL Breath Sounds: Diminished  Fluids:    Intake/Output Summary (Last 24 hours) at 04/02/17 1156  Last data filed at 04/02/17 0715   Gross per 24 hour   Intake    360 ml   Output      0 ml   Net    360 ml     Admit " Weight: 74.844 kg (165 lb)  Current      Physical Exam   Constitutional: She is oriented to person, place, and time. She appears well-developed and well-nourished. No distress.   HENT:   Head: Normocephalic.   Left periorbital ecchymosis. Minimal swelling.   Eyes: Conjunctivae and EOM are normal.   Neck: Neck supple. No JVD present.   Cardiovascular: Normal rate, regular rhythm and intact distal pulses.    Wears glasses.    Pulmonary/Chest: Effort normal and breath sounds normal. No respiratory distress. She exhibits tenderness (left rib fractures).   Abdominal: Soft. Bowel sounds are normal. She exhibits no distension. There is no tenderness. There is no rebound.   Left abdominal / flank bruising - tender.   Musculoskeletal: Normal range of motion. She exhibits edema (mild edema left ankle) and tenderness (left torso, flank, and hip. Mild left ankle tenderness.).   Neurological: She is alert and oriented to person, place, and time.   Skin: Skin is warm and dry. She is not diaphoretic. No erythema. No pallor.   Psychiatric: She has a normal mood and affect.   Nursing note and vitals reviewed.      Medical Decision Making/Problem List:    Active Hospital Problems    Diagnosis   • Suicidal behavior [R46.89]     Priority: High     Possible fall was intentional  Legal 2000 placed by police  3/28 Legal hold extended.  4/2 Repeatedly denies suicidal ideations  Mabel Simpson MD. Psychiatry      • Closed fracture of multiple ribs of left side [S22.42XA]     Priority: High     Left second through ninth rib fractures with flail segment of third and fourth ribs  Aggressive pulmonary hygiene, pain control and radiographic studies.      • Multiple facial fractures (CMS-HCC) [S02.92XA]     Priority: Medium     3/25 Maxillofacial CT -Comminuted fracture of lateral wall of left maxillary sinus.   - Fracture of anterolateral wall of the left maxillary sinus extending into inferior and lateral wall of left orbit which is slightly  displaced.   - Left zygomatic arch fracture.  Nonoperative management at this time. Follow up after swelling decreases.  Blayne Wilhelm MD - Facial Surgeon.      • Splenic laceration [S36.039A]     Priority: Medium     3/25 CT imaging with Grade III splenic laceration. Splenic fracture involving hilum with hazy central density suggesting active extravasation.  Trend abdominal exam and laboratory studies.      • Closed fracture of thoracic vertebra (CMS-[facility]) [S22.009A]     Priority: Medium     3/25 CT imaging - Left second through sixth transverse process fractures.    - Bony projection into spinal canal at T7 resulting component of canal narrowing.  - Sclerosis and slight expansion of the posterior elements of T1 and C7.   MRI spine most consistent with an old calcified disc, incidental finding from the trauma presentation  Nonoperative management. No bracing needed.   Follow up in 8 weeks with repeat MRI imaging.  Damián Felipe MD. Neurosurgeon.     • Acute left ankle pain [M25.572]     Priority: Low     Swelling and ecchymosis   3/30 Diagnostic imaging with soft tissue swelling and no acute fracture identified.      • Diplopia [H53.2]     Priority: Low     Complaining of visual disturbance.  Likely secondary to facial/orbital fractures and orbital edema vs decompensated phoria  Nonoperative management: Patch either eye.  Re-check in office 1 month.  Haim Hernandez MD. Opthalmology      • Traumatic pneumothorax [S27.0XXA]     Priority: Low     3/25 CT imaging with a small left anterior and lung base pneumothorax  4/2 CXR Stable moderate left subpulmonic effusion, basilar atelectasis and possible consolidation. No pneumothorax.     • Alcohol intoxication (CMS-HCC) [F10.129]     Priority: Low     Admit BA .22  SBIRT completed.      • Inadequate anticoagulation [Z51.81, Z79.01]     Priority: Low     Prophylactic Lovenox initially contraindicated due to elevated bleeding risk from spleen injury.  RAP score 8  3/27   Lovenox initiated.  Lower extremity trauma sonogram as clinically indicated.       Core Measures & Quality Metrics:  Labs reviewed, Medications reviewed and Radiology images reviewed  Walden catheter: No Walden      DVT Prophylaxis: Enoxaparin (Lovenox)  DVT prophylaxis - mechanical: SCDs  Ulcer prophylaxis: Yes    Assessed for rehab: Patient was assess for and/or received rehabilitation services during this hospitalization    Total Score: 8  ETOH Screening  CAGE Score: 0  Intervention complete date: 3/31/2017  Patient response to intervention: Denies illicit drug/alcohol abuse. Smokes 1 pack of cigarettes a day. .   Patient demonstrats understanding of intervention.Plan of care: patient refused substance abuse cessation resources     has not been contacted.Follow up with: PCP and Clinic  Total ETOH intervention time: 15 - 30 mintues    Discussed patient condition with RN, Patient and trauma surgery Dr Felipe.    Patient seen, data reviewed and discussed.  Agree with assessment and plan.  GARIMA

## 2017-04-03 ENCOUNTER — APPOINTMENT (OUTPATIENT)
Dept: RADIOLOGY | Facility: MEDICAL CENTER | Age: 47
DRG: 964 | End: 2017-04-03
Attending: SURGERY
Payer: COMMERCIAL

## 2017-04-03 LAB
ALBUMIN SERPL BCP-MCNC: 4 G/DL (ref 3.2–4.9)
ALBUMIN/GLOB SERPL: 1.1 G/DL
ALP SERPL-CCNC: 95 U/L (ref 30–99)
ALT SERPL-CCNC: 74 U/L (ref 2–50)
ANION GAP SERPL CALC-SCNC: 9 MMOL/L (ref 0–11.9)
AST SERPL-CCNC: 40 U/L (ref 12–45)
BASOPHILS # BLD AUTO: 0.3 % (ref 0–1.8)
BASOPHILS # BLD: 0.03 K/UL (ref 0–0.12)
BILIRUB SERPL-MCNC: 0.6 MG/DL (ref 0.1–1.5)
BUN SERPL-MCNC: 17 MG/DL (ref 8–22)
CALCIUM SERPL-MCNC: 9.4 MG/DL (ref 8.5–10.5)
CHLORIDE SERPL-SCNC: 99 MMOL/L (ref 96–112)
CO2 SERPL-SCNC: 26 MMOL/L (ref 20–33)
CREAT SERPL-MCNC: 0.69 MG/DL (ref 0.5–1.4)
EOSINOPHIL # BLD AUTO: 0.12 K/UL (ref 0–0.51)
EOSINOPHIL NFR BLD: 1.2 % (ref 0–6.9)
ERYTHROCYTE [DISTWIDTH] IN BLOOD BY AUTOMATED COUNT: 40.5 FL (ref 35.9–50)
GFR SERPL CREATININE-BSD FRML MDRD: >60 ML/MIN/1.73 M 2
GLOBULIN SER CALC-MCNC: 3.8 G/DL (ref 1.9–3.5)
GLUCOSE SERPL-MCNC: 121 MG/DL (ref 65–99)
HCT VFR BLD AUTO: 41.3 % (ref 37–47)
HGB BLD-MCNC: 14.1 G/DL (ref 12–16)
IMM GRANULOCYTES # BLD AUTO: 0.1 K/UL (ref 0–0.11)
IMM GRANULOCYTES NFR BLD AUTO: 1 % (ref 0–0.9)
LYMPHOCYTES # BLD AUTO: 3.27 K/UL (ref 1–4.8)
LYMPHOCYTES NFR BLD: 33.9 % (ref 22–41)
MAGNESIUM SERPL-MCNC: 2.4 MG/DL (ref 1.5–2.5)
MCH RBC QN AUTO: 31.3 PG (ref 27–33)
MCHC RBC AUTO-ENTMCNC: 34.1 G/DL (ref 33.6–35)
MCV RBC AUTO: 91.6 FL (ref 81.4–97.8)
MONOCYTES # BLD AUTO: 0.92 K/UL (ref 0–0.85)
MONOCYTES NFR BLD AUTO: 9.5 % (ref 0–13.4)
NEUTROPHILS # BLD AUTO: 5.2 K/UL (ref 2–7.15)
NEUTROPHILS NFR BLD: 54.1 % (ref 44–72)
NRBC # BLD AUTO: 0 K/UL
NRBC BLD AUTO-RTO: 0 /100 WBC
PHOSPHATE SERPL-MCNC: 4.3 MG/DL (ref 2.5–4.5)
PLATELET # BLD AUTO: 374 K/UL (ref 164–446)
PMV BLD AUTO: 9.5 FL (ref 9–12.9)
POTASSIUM SERPL-SCNC: 4.1 MMOL/L (ref 3.6–5.5)
PROT SERPL-MCNC: 7.8 G/DL (ref 6–8.2)
RBC # BLD AUTO: 4.51 M/UL (ref 4.2–5.4)
SODIUM SERPL-SCNC: 134 MMOL/L (ref 135–145)
WBC # BLD AUTO: 9.6 K/UL (ref 4.8–10.8)

## 2017-04-03 PROCEDURE — G8979 MOBILITY GOAL STATUS: HCPCS | Mod: CJ

## 2017-04-03 PROCEDURE — 36415 COLL VENOUS BLD VENIPUNCTURE: CPT

## 2017-04-03 PROCEDURE — 80053 COMPREHEN METABOLIC PANEL: CPT

## 2017-04-03 PROCEDURE — 770001 HCHG ROOM/CARE - MED/SURG/GYN PRIV*

## 2017-04-03 PROCEDURE — G8987 SELF CARE CURRENT STATUS: HCPCS | Mod: CJ

## 2017-04-03 PROCEDURE — A9270 NON-COVERED ITEM OR SERVICE: HCPCS | Performed by: NURSE PRACTITIONER

## 2017-04-03 PROCEDURE — 700102 HCHG RX REV CODE 250 W/ 637 OVERRIDE(OP): Performed by: NURSE PRACTITIONER

## 2017-04-03 PROCEDURE — 71010 DX-CHEST-LIMITED (1 VIEW): CPT

## 2017-04-03 PROCEDURE — 83735 ASSAY OF MAGNESIUM: CPT

## 2017-04-03 PROCEDURE — A9270 NON-COVERED ITEM OR SERVICE: HCPCS | Performed by: SURGERY

## 2017-04-03 PROCEDURE — 700102 HCHG RX REV CODE 250 W/ 637 OVERRIDE(OP): Performed by: SURGERY

## 2017-04-03 PROCEDURE — 700111 HCHG RX REV CODE 636 W/ 250 OVERRIDE (IP): Performed by: NURSE PRACTITIONER

## 2017-04-03 PROCEDURE — 97165 OT EVAL LOW COMPLEX 30 MIN: CPT

## 2017-04-03 PROCEDURE — 700111 HCHG RX REV CODE 636 W/ 250 OVERRIDE (IP): Performed by: SURGERY

## 2017-04-03 PROCEDURE — 85025 COMPLETE CBC W/AUTO DIFF WBC: CPT

## 2017-04-03 PROCEDURE — 84100 ASSAY OF PHOSPHORUS: CPT

## 2017-04-03 PROCEDURE — G8988 SELF CARE GOAL STATUS: HCPCS | Mod: CJ

## 2017-04-03 RX ORDER — MORPHINE SULFATE 4 MG/ML
2 INJECTION, SOLUTION INTRAMUSCULAR; INTRAVENOUS EVERY 6 HOURS PRN
Status: DISCONTINUED | OUTPATIENT
Start: 2017-04-03 | End: 2017-04-05

## 2017-04-03 RX ADMIN — MORPHINE SULFATE 2 MG: 4 INJECTION INTRAVENOUS at 02:54

## 2017-04-03 RX ADMIN — OXYCODONE HYDROCHLORIDE 10 MG: 10 TABLET ORAL at 12:39

## 2017-04-03 RX ADMIN — ENOXAPARIN SODIUM 30 MG: 100 INJECTION SUBCUTANEOUS at 07:46

## 2017-04-03 RX ADMIN — OXYCODONE HYDROCHLORIDE 10 MG: 10 TABLET ORAL at 01:46

## 2017-04-03 RX ADMIN — METHADONE HYDROCHLORIDE 5 MG: 10 TABLET ORAL at 07:46

## 2017-04-03 RX ADMIN — OXYCODONE HYDROCHLORIDE 10 MG: 10 TABLET ORAL at 17:19

## 2017-04-03 RX ADMIN — OXYCODONE HYDROCHLORIDE 10 MG: 10 TABLET ORAL at 04:47

## 2017-04-03 RX ADMIN — ENOXAPARIN SODIUM 30 MG: 100 INJECTION SUBCUTANEOUS at 22:40

## 2017-04-03 RX ADMIN — NICOTINE 14 MG: 14 PATCH TRANSDERMAL at 04:53

## 2017-04-03 RX ADMIN — FAMOTIDINE 20 MG: 20 TABLET, FILM COATED ORAL at 22:41

## 2017-04-03 RX ADMIN — OXYCODONE HYDROCHLORIDE 10 MG: 10 TABLET ORAL at 20:47

## 2017-04-03 RX ADMIN — FAMOTIDINE 20 MG: 20 TABLET, FILM COATED ORAL at 07:46

## 2017-04-03 ASSESSMENT — ENCOUNTER SYMPTOMS
DIZZINESS: 0
FEVER: 0
SHORTNESS OF BREATH: 0
DOUBLE VISION: 0
HEADACHES: 0
COUGH: 0
NECK PAIN: 0
BACK PAIN: 1
MYALGIAS: 1
EYE PAIN: 0
CONSTITUTIONAL NEGATIVE: 1
ROS GI COMMENTS: BM 4/2
NAUSEA: 0
EYE REDNESS: 0
VOMITING: 0
ABDOMINAL PAIN: 0
SENSORY CHANGE: 0
BLURRED VISION: 1

## 2017-04-03 ASSESSMENT — PAIN SCALES - GENERAL
PAINLEVEL_OUTOF10: 9
PAINLEVEL_OUTOF10: 7
PAINLEVEL_OUTOF10: 9
PAINLEVEL_OUTOF10: 8
PAINLEVEL_OUTOF10: 3
PAINLEVEL_OUTOF10: 9

## 2017-04-03 ASSESSMENT — ACTIVITIES OF DAILY LIVING (ADL): TOILETING: INDEPENDENT

## 2017-04-03 NOTE — PROGRESS NOTES
"Pt A&OX4. VSS. SCD's implemented. Pt denies chest pain/shortness of breath. Pt c/o numbness/tingling to LUE since her \"fall\". Pt tolerating regular diet with no c/o nausea/vomiting. Legal hold remains in place, sitter at bedside. Bruising noted throughout pt's skin. Pt c/o rib cage pain this AM, medicated with PRN for pain. Continue to monitor pain level and provide intervention as needed. Plan of care reviewed. Bed in lowest position. Call light within reach. Continue to monitor.   "

## 2017-04-03 NOTE — PROGRESS NOTES
Received report from night RN. Resumed care of pt. Bed in lowest position. Call light within reach. Continue to monitor.

## 2017-04-03 NOTE — PROGRESS NOTES
"  Trauma/Surgical Progress Note    Author: Brenna Marx Date & Time created: 4/3/2017   7:53 AM     Interval Events:  Labile mood this AM. Adequate pain control.   D/c methadone today.  CXR stable.  OOB and ambulating in hallway.  Continue legal hold / sitter. Repeatedly denies suicidal ideations.  Medically cleared for Court on Wednesday.    Review of Systems   Constitutional: Negative.  Negative for fever.   HENT: Negative.         Jaw pain   Eyes: Positive for blurred vision (left eye - improving ). Negative for double vision, pain and redness.        Wears glasses   Respiratory: Negative for cough and shortness of breath.    Cardiovascular: Positive for chest pain. Negative for leg swelling.        Rib fractures   Gastrointestinal: Negative for nausea, vomiting and abdominal pain.        BM 4/2   Genitourinary: Negative.  Negative for dysuria.        Voiding   Musculoskeletal: Positive for myalgias, back pain and joint pain. Negative for neck pain.        Right rib cage, mid back pain.  Left ankle mild pain.   Skin: Negative.  Negative for itching and rash.   Neurological: Negative for dizziness, sensory change and headaches.   Psychiatric/Behavioral: Negative for suicidal ideas.        Denies suicidal ideas.     Hemodynamics:  Blood pressure 118/70, pulse 75, temperature 36.1 °C (97 °F), resp. rate 17, height 1.626 m (5' 4\"), weight 74 kg (163 lb 2.3 oz), last menstrual period 03/25/2017, SpO2 92 %, not currently breastfeeding.     Respiratory:    Respiration: 17, Pulse Oximetry: 92 %     Work Of Breathing / Effort: Mild;Shallow  RUL Breath Sounds: Clear, RML Breath Sounds: Clear, RLL Breath Sounds: Diminished, LUIS M Breath Sounds: Clear, LLL Breath Sounds: Diminished  Fluids:    Intake/Output Summary (Last 24 hours) at 04/03/17 4745  Last data filed at 04/03/17 0245   Gross per 24 hour   Intake      0 ml   Output      0 ml   Net      0 ml     Admit Weight: 74.844 kg (165 lb)  Current      Physical Exam "   Constitutional: She is oriented to person, place, and time. She appears well-developed and well-nourished.   HENT:   Head: Normocephalic.   Left periorbital ecchymosis. Minimal swelling.   Eyes: Conjunctivae and EOM are normal.   Neck: Neck supple. No JVD present. No tracheal deviation present.   Cardiovascular: Normal rate, regular rhythm and intact distal pulses.    Wears glasses.    Pulmonary/Chest: Effort normal and breath sounds normal. No respiratory distress. She exhibits tenderness (left rib fractures).   Abdominal: Soft. Bowel sounds are normal. She exhibits no distension. There is no tenderness. There is no rebound.   Left abdominal / flank bruising - tender.   Musculoskeletal: Normal range of motion. She exhibits edema (mild edema left ankle) and tenderness (left torso, flank, and hip. Mild left ankle tenderness.).   Neurological: She is alert and oriented to person, place, and time.   Skin: Skin is warm and dry.   Psychiatric:   Labile mood   Nursing note and vitals reviewed.      Medical Decision Making/Problem List:    Active Hospital Problems    Diagnosis   • Suicidal behavior [R46.89]     Priority: High     Possible fall was intentional  Legal 2000 placed by police  3/28 Legal hold extended.  4/2 Repeatedly denies suicidal ideations  Mabel Simpson MD. Psychiatry       • Closed fracture of multiple ribs of left side [S22.42XA]     Priority: High     Left second through ninth rib fractures with flail segment of third and fourth ribs  Aggressive pulmonary hygiene, pain control and radiographic studies.       • Multiple facial fractures (CMS-[facility]) [S02.92XA]     Priority: Medium     3/25 Maxillofacial CT -Comminuted fracture of lateral wall of left maxillary sinus.   - Fracture of anterolateral wall of the left maxillary sinus extending into inferior and lateral wall of left orbit which is slightly displaced.   - Left zygomatic arch fracture.  Nonoperative management at this time. Follow up after  swelling decreases.  Blayne Wilhelm MD - Facial Surgeon.       • Acute left ankle pain [M25.572]     Priority: Low     Swelling and ecchymosis   3/30 Diagnostic imaging with soft tissue swelling and no acute fracture identified.   Ace wrap for comfort and support.     • Diplopia [H53.2]     Priority: Low     Complaining of visual disturbance.  Likely secondary to facial/orbital fractures and orbital edema vs decompensated phoria  Nonoperative management: Patch either eye.  Re-check in office 1 month.  Haim Hernandez MD. Opthalmology       • Traumatic pneumothorax [S27.0XXA]     Priority: Low     3/25 CT imaging with a small left anterior and lung base pneumothorax  4/3 CXR Stable moderate left subpulmonic effusion, basilar atelectasis and possible consolidation No pneumothorax.     • Splenic laceration [S36.039A]     Priority: Low     3/25 CT imaging with Grade III splenic laceration. Splenic fracture involving hilum with hazy central density suggesting active extravasation.   Trend abdominal exam and laboratory studies.      • Closed fracture of thoracic vertebra (CMS-[facility]) [S22.009A]     Priority: Low     3/25 CT imaging - Left second through sixth transverse process fractures.    - Bony projection into spinal canal at T7 resulting component of canal narrowing.  - Sclerosis and slight expansion of the posterior elements of T1 and C7.   MRI spine most consistent with an old calcified disc, incidental finding from the trauma presentation  Nonoperative management. No bracing needed.   Follow up in 8 weeks with repeat MRI imaging.(5/20)  Damián Felipe MD. Neurosurgeon.     • Alcohol intoxication (CMS-[facility]) [F10.129]     Priority: Low     Admit BA .22  SBIRT completed.       • Inadequate anticoagulation [Z51.81, Z79.01]     Priority: Low     Prophylactic Lovenox initially contraindicated due to elevated bleeding risk from spleen injury.  RAP score 8  3/27  Lovenox initiated.  Lower extremity trauma sonogram as clinically  indicated.        Core Measures & Quality Metrics:  Labs reviewed, Medications reviewed and Radiology images reviewed  Walden catheter: No Walden      DVT Prophylaxis: Enoxaparin (Lovenox)  DVT prophylaxis - mechanical: SCDs  Ulcer prophylaxis: Yes    Assessed for rehab: Patient was assess for and/or received rehabilitation services during this hospitalization    Total Score: 8  ETOH Screening  CAGE Score: 0  Intervention complete date: 3/31/2017  Patient response to intervention: Denies illicit drug/alcohol abuse. Smokes 1 pack of cigarettes a day. .   Patient demonstrats understanding of intervention.Plan of care: patient refused substance abuse cessation resources     has not been contacted.Follow up with: PCP and Clinic  Total ETOH intervention time: 15 - 30 mintues    Discussed patient condition with RN, Patient and trauma surgery Dr Hernandez.

## 2017-04-03 NOTE — THERAPY
"Occupational Therapy Evaluation completed.   Functional Status:  necessitating Mod A ADLs and Min A txfrs and AE  Plan of Care: Will benefit from Occupational Therapy 3 times per week  Discharge Recommendations:  Equipment: Will Continue to Assess for Equipment Needs. Post-acute therapy Discharge to a transitional care facility for continued skilled therapy services. and Discharge to home with outpatient or home health for additional skilled therapy services.    Patient was apparently intoxicated and either fell/jumped from 2nd story window resulting in multiple rib fxs, bruises, and generalized stiffness with spinal precautions. Patient report I with ADLs, IADLs, and ambulation PTA including working two jobs. 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 Patient verbalize concern for dog care upon dc as dogs will be returned to her. Patient, upon eval, presents with generalized stiffness/soreness/pain, decreased balance, endurance, tolerance, and ADL participation necessitating Mod A ADLs and Min A txfrs and AE.Patient would benefit from skilled OT in this setting followed by rehab facility to max gains and promote safe DC to home with services/OP work hardening.     See \"Rehab Therapy-Acute\" Patient Summary Report for complete documentation.    "

## 2017-04-03 NOTE — DISCHARGE PLANNING
Medical Social Work    Referral: Medically cleared for court     Intervention: Per MD, pt is medically stable to appear for mental health court on Wednesday 04/05/17. Medical clearance has been signed by MD on legal hold documentation. Faxed signed medical clearance to Meagan @ [phone]. Received fax confirmation.     Plan: Pt to appear to mental health court on Wednesday 04/05/17 for legal hold determination. Will continue to follow.

## 2017-04-03 NOTE — DISCHARGE PLANNING
Medical Social Work    Referral: Discharge planning     Intervention: Per bedside RN, pt requesting to speak with SW'er regarding rental assistance and other resources needed in the community. Met w/ pt at bedside and she stated her rent and power is due in two days and she does not have the money needed to pay these bills. Pt states she has no family or friends to help her pay for rent in the next two days. She stated she would like to apply for disability to help with further financial barriers.     SW returned to bedside (pt had two visitors at bedside) and provided her with multiple community resources as well as a disability check-list. There is no disability application to print out for pt. Informed pt that [employer] can sometimes help with rental assistance and provided her the resource and phone number. JAMIE also wrote a letter for pt's [employer], requesting they accommodate pt's due bill as pt is in the hospital. Provided to pt. Pt is agreeable to the above plan.     Plan: Nothing further. Pt to call provided resources to try and assist with getting her rent paid for that is due in the next few days.

## 2017-04-03 NOTE — CARE PLAN
Problem: Safety  Goal: Will remain free from injury  Outcome: PROGRESSING AS EXPECTED  Pt remained safe and free from falls this shift. Room clear of any obstacles. Bed in lowest position. Call light within reach. Sitter at bedside. Legal hold in remains in place. Fall precautions in place.     Problem: Psychosocial Needs:  Goal: Level of anxiety will decrease  Outcome: PROGRESSING AS EXPECTED  Legal hold remains in place. Sitter at bedside at all times. Pt denies suicide/homicide thoughts/ideations this shift.

## 2017-04-03 NOTE — CARE PLAN
Problem: Safety  Goal: Will remain free from injury  Outcome: PROGRESSING AS EXPECTED  1:1 sitter at bedside.     Problem: Pain Management  Goal: Pain level will decrease to patient’s comfort goal  Outcome: PROGRESSING AS EXPECTED  Pain managed by oxycodone prn    Problem: Psychosocial Needs:  Goal: Level of anxiety will decrease  Outcome: PROGRESSING AS EXPECTED  Pt appears calm this shift. Denies anxiety. Pt states no thoughts of self harm    Problem: Mobility  Goal: Risk for activity intolerance will decrease  Outcome: PROGRESSING AS EXPECTED  Pt ambulated in halls today with FWW, accompanied by PSA

## 2017-04-04 ENCOUNTER — APPOINTMENT (OUTPATIENT)
Dept: RADIOLOGY | Facility: MEDICAL CENTER | Age: 47
DRG: 964 | End: 2017-04-04
Attending: SURGERY
Payer: COMMERCIAL

## 2017-04-04 ENCOUNTER — APPOINTMENT (OUTPATIENT)
Dept: RADIOLOGY | Facility: MEDICAL CENTER | Age: 47
DRG: 964 | End: 2017-04-04
Attending: NURSE PRACTITIONER
Payer: COMMERCIAL

## 2017-04-04 PROBLEM — M25.512 LEFT SHOULDER PAIN: Status: ACTIVE | Noted: 2017-04-04

## 2017-04-04 PROCEDURE — 700102 HCHG RX REV CODE 250 W/ 637 OVERRIDE(OP): Performed by: SURGERY

## 2017-04-04 PROCEDURE — 700102 HCHG RX REV CODE 250 W/ 637 OVERRIDE(OP): Performed by: NURSE PRACTITIONER

## 2017-04-04 PROCEDURE — 700111 HCHG RX REV CODE 636 W/ 250 OVERRIDE (IP): Performed by: NURSE PRACTITIONER

## 2017-04-04 PROCEDURE — 97116 GAIT TRAINING THERAPY: CPT

## 2017-04-04 PROCEDURE — 73030 X-RAY EXAM OF SHOULDER: CPT | Mod: LT

## 2017-04-04 PROCEDURE — A9270 NON-COVERED ITEM OR SERVICE: HCPCS | Performed by: NURSE PRACTITIONER

## 2017-04-04 PROCEDURE — 71010 DX-CHEST-LIMITED (1 VIEW): CPT

## 2017-04-04 PROCEDURE — A9270 NON-COVERED ITEM OR SERVICE: HCPCS | Performed by: SURGERY

## 2017-04-04 PROCEDURE — 770001 HCHG ROOM/CARE - MED/SURG/GYN PRIV*

## 2017-04-04 PROCEDURE — 700111 HCHG RX REV CODE 636 W/ 250 OVERRIDE (IP): Performed by: SURGERY

## 2017-04-04 PROCEDURE — 97530 THERAPEUTIC ACTIVITIES: CPT

## 2017-04-04 RX ADMIN — OXYCODONE HYDROCHLORIDE 10 MG: 10 TABLET ORAL at 13:01

## 2017-04-04 RX ADMIN — OXYCODONE HYDROCHLORIDE 10 MG: 10 TABLET ORAL at 20:53

## 2017-04-04 RX ADMIN — OXYCODONE HYDROCHLORIDE 10 MG: 10 TABLET ORAL at 05:34

## 2017-04-04 RX ADMIN — OXYCODONE HYDROCHLORIDE 10 MG: 10 TABLET ORAL at 01:50

## 2017-04-04 RX ADMIN — OXYCODONE HYDROCHLORIDE 10 MG: 10 TABLET ORAL at 17:53

## 2017-04-04 RX ADMIN — NICOTINE 14 MG: 14 PATCH TRANSDERMAL at 05:32

## 2017-04-04 RX ADMIN — MORPHINE SULFATE 2 MG: 4 INJECTION INTRAVENOUS at 00:09

## 2017-04-04 RX ADMIN — ENOXAPARIN SODIUM 30 MG: 100 INJECTION SUBCUTANEOUS at 08:41

## 2017-04-04 RX ADMIN — FAMOTIDINE 20 MG: 20 TABLET, FILM COATED ORAL at 08:39

## 2017-04-04 RX ADMIN — ENOXAPARIN SODIUM 30 MG: 100 INJECTION SUBCUTANEOUS at 20:53

## 2017-04-04 RX ADMIN — FAMOTIDINE 20 MG: 20 TABLET, FILM COATED ORAL at 20:53

## 2017-04-04 RX ADMIN — MORPHINE SULFATE 2 MG: 4 INJECTION INTRAVENOUS at 14:57

## 2017-04-04 RX ADMIN — OXYCODONE HYDROCHLORIDE 10 MG: 10 TABLET ORAL at 08:40

## 2017-04-04 ASSESSMENT — LIFESTYLE VARIABLES
AVERAGE NUMBER OF DAYS PER WEEK YOU HAVE A DRINK CONTAINING ALCOHOL: 0
TOTAL SCORE: 0
HAVE YOU EVER FELT YOU SHOULD CUT DOWN ON YOUR DRINKING: NO
EVER HAD A DRINK FIRST THING IN THE MORNING TO STEADY YOUR NERVES TO GET RID OF A HANGOVER: NO
TOTAL SCORE: 0
HAVE PEOPLE ANNOYED YOU BY CRITICIZING YOUR DRINKING: NO
TOTAL SCORE: 0
CONSUMPTION TOTAL: INCOMPLETE
ON A TYPICAL DAY WHEN YOU DRINK ALCOHOL HOW MANY DRINKS DO YOU HAVE: 0
EVER FELT BAD OR GUILTY ABOUT YOUR DRINKING: NO
DO YOU DRINK ALCOHOL: YES

## 2017-04-04 ASSESSMENT — PAIN SCALES - GENERAL
PAINLEVEL_OUTOF10: 5
PAINLEVEL_OUTOF10: 7
PAINLEVEL_OUTOF10: 6
PAINLEVEL_OUTOF10: 9
PAINLEVEL_OUTOF10: 7
PAINLEVEL_OUTOF10: 7
PAINLEVEL_OUTOF10: 8
PAINLEVEL_OUTOF10: 10
PAINLEVEL_OUTOF10: 6
PAINLEVEL_OUTOF10: 5
PAINLEVEL_OUTOF10: 8

## 2017-04-04 ASSESSMENT — COPD QUESTIONNAIRES
COPD SCREENING SCORE: 2
HAVE YOU SMOKED AT LEAST 100 CIGARETTES IN YOUR ENTIRE LIFE: YES
DO YOU EVER COUGH UP ANY MUCUS OR PHLEGM?: NO/ONLY WITH OCCASIONAL COLDS OR INFECTIONS
DURING THE PAST 4 WEEKS HOW MUCH DID YOU FEEL SHORT OF BREATH: NONE/LITTLE OF THE TIME

## 2017-04-04 ASSESSMENT — GAIT ASSESSMENTS
DISTANCE (FEET): 500
DEVIATION: ANTALGIC;DECREASED BASE OF SUPPORT
GAIT LEVEL OF ASSIST: MINIMAL ASSIST
ASSISTIVE DEVICE: FRONT WHEEL WALKER

## 2017-04-04 ASSESSMENT — PATIENT HEALTH QUESTIONNAIRE - PHQ9
SUM OF ALL RESPONSES TO PHQ QUESTIONS 1-9: 0
SUM OF ALL RESPONSES TO PHQ9 QUESTIONS 1 AND 2: 0
1. LITTLE INTEREST OR PLEASURE IN DOING THINGS: NOT AT ALL
2. FEELING DOWN, DEPRESSED, IRRITABLE, OR HOPELESS: NOT AT ALL

## 2017-04-04 ASSESSMENT — ENCOUNTER SYMPTOMS
NAUSEA: 0
MYALGIAS: 1
CONSTITUTIONAL NEGATIVE: 1
BACK PAIN: 1
FEVER: 0
EYE REDNESS: 0
COUGH: 0
EYE PAIN: 0
SHORTNESS OF BREATH: 0
VOMITING: 0
BLURRED VISION: 1
FOCAL WEAKNESS: 0
ROS GI COMMENTS: BM 4/2
NECK PAIN: 0
DIZZINESS: 1
ABDOMINAL PAIN: 0
SENSORY CHANGE: 1
DOUBLE VISION: 0
CHILLS: 0

## 2017-04-04 NOTE — CARE PLAN
Problem: Safety  Goal: Will remain free from falls  Outcome: PROGRESSING AS EXPECTED  Intervention: Assess risk factors for falls    04/03/17 2000 04/04/17 0500   OTHER   Fall Risk High Risk to Fall - 2 or more points  --    Risk for Injury-Any positive answers results in the pt being at high risk for fall related injury Alcohol Abuse --    Mobility Status Assessment 1-1 Healthcare Provider Required for Assistance with Ambulation & Transfer --    History of fall 2 --    Pt Calls for Assistance --  No       Intervention: Implement fall precautions    04/03/17 2000   OTHER   Environmental Precautions Treaded Slipper Socks on Patient;Personal Belongings, Wastebasket, Call Bell etc. in Easy Reach;Report Given to Other Health Care Providers Regarding Fall Risk;Bed in Low Position;Communication Sign for Patients & Families;Mobility Assessed & Appropriate Sign Placed   Bedrails Bedrails Closest to Bathroom Down           Problem: Venous Thromboembolism (VTW)/Deep Vein Thrombosis (DVT) Prevention:  Goal: Patient will participate in Venous Thrombosis (VTE)/Deep Vein Thrombosis (DVT)Prevention Measures  Outcome: PROGRESSING AS EXPECTED    04/03/17 2000   OTHER   Risk Assessment Score 4   VTE RISK High   Mechanical Prophylaxis SCDs, Sequentials (Intermittent Pneumatic Compression Devices)   Pharmacologic Prophylaxis Used LMWH: Enoxaparin(Lovenox)

## 2017-04-04 NOTE — PROGRESS NOTES
"Pt A&O x4. Restless, anxious.     Vitals: /78 mmHg  Pulse 79  Temp(Src) 36.6 °C (97.8 °F)  Resp 18  Ht 1.626 m (5' 4\")  Wt 74 kg (163 lb 2.3 oz)  BMI 27.99 kg/m2  SpO2 94%  LMP 03/25/2017 (Exact Date)  Breastfeeding? No    Pt rates pain 8 out of 10. Medicated for pain.    Neuro: IQBAL. LLE/ LUE weakness, 4/5 strength. LUE numbness, pt states numbness is now radiating to fingertips.     Cardiac: Denies new onset of chest pain.    Vascular: Pulses 2+ BUE, BLE. Mild edema noted around bilateral orbitals, more so on the left eye. Left ankle swelling, 1+.     Respiratory: Lungs sound diminished in bases. Cough strong. Denies SOB.    GI: Abdomen soft, rounded. + bowel sounds, + flatus, + BM today. On regular diet, tolerating well. - nausea/ vomiting.    : Pt voiding adequately.      MSK: Pt up to bathroom self, no assistance required, tolerating well.    Integumentary: Generalized bruising/ swelling noted around face, LUE, LLE, and left trunk.    Labs noted.    Fall precautions in place: Bed locked in lowest position, Upper bed rails up, treaded socks in place, personal belongings within reach, call light within reach, appropriate mobility signs in place, - bed alarm. Pt calls appropriately.     Pt updated on POC.     Pt on legal hold, 1:1 sitter at bedside continuously.   "

## 2017-04-04 NOTE — THERAPY
"Physical Therapy Treatment completed.   Bed Mobility:  Supine to Sit: Contact Guard Assist  Transfers: Sit to Stand: Contact Guard Assist  Gait: Level Of Assist: Minimal Assist with Front-Wheel Walker       Plan of Care: Will benefit from Physical Therapy 3 times per week  Discharge Recommendations: Equipment: Will Continue to Assess for Equipment Needs. Post-acute therapy Discharge to a transitional care facility for continued skilled therapy services. and Discharge to home with outpatient or home health for additional skilled therapy services.    Pt was willing to participate and just received IV pain meds.  Pt had Pt demonstrate log roll with HOB flat however Pt had too much pain rolling to her R side and therefore required HOB up.  Pt performed bed mobility with HOB up with CGA as pain had increased.  Pt performed transfers with CGA using FWW and ambulated 500' with Maryann using FWW.  Pt's gait was steady until she let go of her FWW for which she had two episodes of LOB requing Maryann.  Pt will continue to benefit from skilled PT while here at [facility].     See \"Rehab Therapy-Acute\" Patient Summary Report for complete documentation.       "

## 2017-04-04 NOTE — PROGRESS NOTES
Pt sitting up in bed, used call bell to get pain medication rating pain 8/10 with movement, and stiffness while in bed.  Pt given PRN Oxy 10mg. Education provided on alternatives ie: ice packs. Pt stated she did not want to use an ice pack at this time.  Pt reports loose BM this a.m., refused bowel medications.  Pt has bruising on left side of face, brown and purple in color, slightly edematous, slight edema on left ankle, where pt also reports pain w/amb.  Pt denies N&V, is oriented x 4, alert.  Pt reports numbness and tingling on left side of her body, mostly in the LUE.  PERRL. Pt waiting on psych consult.

## 2017-04-04 NOTE — PROGRESS NOTES
"  Trauma/Surgical Progress Note    Author: Brenna Marx Date & Time created: 4/4/2017   11:35 AM     Interval Events:  Left shoulder pain with associated numbness - xray ordered / no limitations of ROM.  CXR stable.  Follow up on facial surgery plan.  Legal hold and sitter.  Cleared for court tomorrow.    Review of Systems   Constitutional: Negative.  Negative for fever and chills.   HENT: Negative.         Jaw pain, left facial pain / pressure   Eyes: Positive for blurred vision (left eye ). Negative for double vision, pain and redness.        Wears glasses   Respiratory: Negative for cough and shortness of breath.    Cardiovascular: Positive for chest pain. Negative for leg swelling.        Rib fractures   Gastrointestinal: Negative for nausea, vomiting and abdominal pain.        BM 4/2   Genitourinary: Negative.  Negative for dysuria.        Voiding   Musculoskeletal: Positive for myalgias, back pain and joint pain. Negative for neck pain.        Right rib cage, mid back pain.  Left ankle mild pain.   Skin: Negative.    Neurological: Positive for dizziness (with position changes - intemittent, transient) and sensory change (left arm). Negative for focal weakness.   Psychiatric/Behavioral: Negative for suicidal ideas.        Denies suicidal ideas.     Hemodynamics:  Blood pressure 118/76, pulse 74, temperature 36.2 °C (97.2 °F), resp. rate 18, height 1.626 m (5' 4\"), weight 74 kg (163 lb 2.3 oz), last menstrual period 03/25/2017, SpO2 96 %, not currently breastfeeding.     Respiratory:    Respiration: 18, Pulse Oximetry: 96 %     Work Of Breathing / Effort: Mild;Shallow (pain with inspiration)  RUL Breath Sounds: Clear, RML Breath Sounds: Clear, RLL Breath Sounds: Clear, LUIS M Breath Sounds: Clear, LLL Breath Sounds: Diminished  Fluids:    Intake/Output Summary (Last 24 hours) at 04/04/17 1135  Last data filed at 04/04/17 0710   Gross per 24 hour   Intake    200 ml   Output      0 ml   Net    200 ml     Admit " Weight: 74.844 kg (165 lb)  Current      Physical Exam   Constitutional: She is oriented to person, place, and time. She appears well-developed and well-nourished. No distress.   HENT:   Head: Normocephalic.   Left periorbital ecchymosis. Minimal swelling.   Eyes: Conjunctivae and EOM are normal.   Neck: Neck supple. No JVD present. No tracheal deviation present.   Cardiovascular: Normal rate, regular rhythm and intact distal pulses.    Wears glasses.    Pulmonary/Chest: Effort normal and breath sounds normal. No respiratory distress. She exhibits tenderness (left rib fractures).   Abdominal: Soft. Bowel sounds are normal. She exhibits no distension. There is no tenderness. There is no rebound.   Left abdominal / flank bruising - tender.   Musculoskeletal: Normal range of motion. She exhibits edema (mild edema left ankle) and tenderness (left torso, flank, hip and shoulder. Mild left ankle tenderness.).   Neurological: She is alert and oriented to person, place, and time.   Skin: Skin is warm and dry. She is not diaphoretic.   Psychiatric: She has a normal mood and affect.   Nursing note and vitals reviewed.      Medical Decision Making/Problem List:    Active Hospital Problems    Diagnosis   • Suicidal behavior [R46.89]     Priority: High     Possible fall was intentional  Legal 2000 placed by police  3/28 Legal hold extended.  4/2 Repeatedly denies suicidal ideations  4/5 Cleared for court  Mabel Simpson MD. Psychiatry      • Multiple facial fractures (CMS-[facility]) [S02.92XA]     Priority: Medium     3/25 Maxillofacial CT -Comminuted fracture of lateral wall of left maxillary sinus.   - Fracture of anterolateral wall of the left maxillary sinus extending into inferior and lateral wall of left orbit which is slightly displaced.   - Left zygomatic arch fracture.  Nonoperative management at this time. Follow up after swelling decreases.  Blayne Wilhelm MD - Facial Surgeon.        • Closed fracture of multiple ribs of  left side [S22.42XA]     Priority: Medium     Left second through ninth rib fractures with flail segment of third and fourth ribs  Aggressive pulmonary hygiene, pain control and radiographic studies.         • Left shoulder pain [M25.512]     Priority: Low     Associated with progressively worsening left extremity numbness.  Normal ROM  Imaging pending     • Acute left ankle pain [M25.572]     Priority: Low     Swelling and ecchymosis   3/30 Diagnostic imaging with soft tissue swelling and no acute fracture identified.   Ace wrap for comfort and support.      • Diplopia [H53.2]     Priority: Low     Complaining of visual disturbance.  Likely secondary to facial/orbital fractures and orbital edema vs decompensated phoria  Nonoperative management: Patch either eye.  Re-check in office 1 month.   Haim Hernandez MD. Opthalmology       • Traumatic pneumothorax [S27.0XXA]     Priority: Low     3/25 CT imaging with a small left anterior and lung base pneumothorax  4/3 CXR Stable moderate left subpulmonic effusion, basilar atelectasis and possible consolidation No pneumothorax.      • Splenic laceration [S36.039A]     Priority: Low     3/25 CT imaging with Grade III splenic laceration. Splenic fracture involving hilum with hazy central density suggesting active extravasation.   Trend abdominal exam and laboratory studies.     • Closed fracture of thoracic vertebra (CMS-HCC) [S22.009A]     Priority: Low     3/25 CT imaging - Left second through sixth transverse process fractures.    - Bony projection into spinal canal at T7 resulting component of canal narrowing.  - Sclerosis and slight expansion of the posterior elements of T1 and C7.   MRI spine most consistent with an old calcified disc, incidental finding from the trauma presentation  Nonoperative management. No bracing needed.   Follow up in 8 weeks with repeat MRI imaging.(5/20)   Damián Felipe MD. Neurosurgeon.     • Alcohol intoxication (CMS-HCC) [F10.129]      Priority: Low     Admit BA .22  SBIRT completed.        • Inadequate anticoagulation [Z51.81, Z79.01]     Priority: Low     Prophylactic Lovenox initially contraindicated due to elevated bleeding risk from spleen injury.  RAP score 8  3/27  Lovenox initiated.  Lower extremity trauma sonogram as clinically indicated.         Core Measures & Quality Metrics:  Labs reviewed, Medications reviewed and Radiology images reviewed  Walden catheter: No Walden      DVT Prophylaxis: Enoxaparin (Lovenox)  DVT prophylaxis - mechanical: SCDs  Ulcer prophylaxis: Yes    Assessed for rehab: Patient was assess for and/or received rehabilitation services during this hospitalization    Total Score: 8  ETOH Screening  CAGE Score: 0  Intervention complete date: 3/31/2017  Patient response to intervention: Denies illicit drug/alcohol abuse. Smokes 1 pack of cigarettes a day. .   Patient demonstrats understanding of intervention.Plan of care: patient refused substance abuse cessation resources     has not been contacted.Follow up with: PCP and Clinic  Total ETOH intervention time: 15 - 30 mintues    Discussed patient condition with RN, Patient and trauma surgery Dr Hernandez.

## 2017-04-05 ENCOUNTER — APPOINTMENT (OUTPATIENT)
Dept: RADIOLOGY | Facility: MEDICAL CENTER | Age: 47
DRG: 964 | End: 2017-04-05
Attending: SURGERY
Payer: COMMERCIAL
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
## 2017-04-05 PROBLEM — S27.0XXA TRAUMATIC PNEUMOTHORAX: Status: RESOLVED | Noted: 2017-03-25 | Resolved: 2017-04-05

## 2017-04-05 LAB
ALBUMIN SERPL BCP-MCNC: 3.9 G/DL (ref 3.2–4.9)
ALBUMIN/GLOB SERPL: 1 G/DL
ALP SERPL-CCNC: 115 U/L (ref 30–99)
ALT SERPL-CCNC: 62 U/L (ref 2–50)
ANION GAP SERPL CALC-SCNC: 9 MMOL/L (ref 0–11.9)
AST SERPL-CCNC: 30 U/L (ref 12–45)
BASOPHILS # BLD AUTO: 0.4 % (ref 0–1.8)
BASOPHILS # BLD: 0.04 K/UL (ref 0–0.12)
BILIRUB SERPL-MCNC: 0.6 MG/DL (ref 0.1–1.5)
BUN SERPL-MCNC: 16 MG/DL (ref 8–22)
CALCIUM SERPL-MCNC: 9.6 MG/DL (ref 8.5–10.5)
CHLORIDE SERPL-SCNC: 97 MMOL/L (ref 96–112)
CO2 SERPL-SCNC: 30 MMOL/L (ref 20–33)
CREAT SERPL-MCNC: 0.75 MG/DL (ref 0.5–1.4)
EOSINOPHIL # BLD AUTO: 0.14 K/UL (ref 0–0.51)
EOSINOPHIL NFR BLD: 1.5 % (ref 0–6.9)
ERYTHROCYTE [DISTWIDTH] IN BLOOD BY AUTOMATED COUNT: 43 FL (ref 35.9–50)
GFR SERPL CREATININE-BSD FRML MDRD: >60 ML/MIN/1.73 M 2
GLOBULIN SER CALC-MCNC: 3.8 G/DL (ref 1.9–3.5)
GLUCOSE SERPL-MCNC: 109 MG/DL (ref 65–99)
HCT VFR BLD AUTO: 42.4 % (ref 37–47)
HGB BLD-MCNC: 14.1 G/DL (ref 12–16)
IMM GRANULOCYTES # BLD AUTO: 0.09 K/UL (ref 0–0.11)
IMM GRANULOCYTES NFR BLD AUTO: 0.9 % (ref 0–0.9)
LYMPHOCYTES # BLD AUTO: 3.76 K/UL (ref 1–4.8)
LYMPHOCYTES NFR BLD: 39.5 % (ref 22–41)
MCH RBC QN AUTO: 31.5 PG (ref 27–33)
MCHC RBC AUTO-ENTMCNC: 33.3 G/DL (ref 33.6–35)
MCV RBC AUTO: 94.9 FL (ref 81.4–97.8)
MONOCYTES # BLD AUTO: 0.72 K/UL (ref 0–0.85)
MONOCYTES NFR BLD AUTO: 7.6 % (ref 0–13.4)
NEUTROPHILS # BLD AUTO: 4.76 K/UL (ref 2–7.15)
NEUTROPHILS NFR BLD: 50.1 % (ref 44–72)
NRBC # BLD AUTO: 0 K/UL
NRBC BLD AUTO-RTO: 0 /100 WBC
PLATELET # BLD AUTO: 425 K/UL (ref 164–446)
PMV BLD AUTO: 9.7 FL (ref 9–12.9)
POTASSIUM SERPL-SCNC: 4 MMOL/L (ref 3.6–5.5)
PROT SERPL-MCNC: 7.7 G/DL (ref 6–8.2)
RBC # BLD AUTO: 4.47 M/UL (ref 4.2–5.4)
SODIUM SERPL-SCNC: 136 MMOL/L (ref 135–145)
WBC # BLD AUTO: 9.5 K/UL (ref 4.8–10.8)

## 2017-04-05 PROCEDURE — 700111 HCHG RX REV CODE 636 W/ 250 OVERRIDE (IP): Performed by: SURGERY

## 2017-04-05 PROCEDURE — 97535 SELF CARE MNGMENT TRAINING: CPT

## 2017-04-05 PROCEDURE — 80053 COMPREHEN METABOLIC PANEL: CPT

## 2017-04-05 PROCEDURE — 85025 COMPLETE CBC W/AUTO DIFF WBC: CPT

## 2017-04-05 PROCEDURE — 36415 COLL VENOUS BLD VENIPUNCTURE: CPT

## 2017-04-05 PROCEDURE — A9270 NON-COVERED ITEM OR SERVICE: HCPCS | Performed by: NURSE PRACTITIONER

## 2017-04-05 PROCEDURE — 700102 HCHG RX REV CODE 250 W/ 637 OVERRIDE(OP): Performed by: NURSE PRACTITIONER

## 2017-04-05 PROCEDURE — A9270 NON-COVERED ITEM OR SERVICE: HCPCS | Performed by: SURGERY

## 2017-04-05 PROCEDURE — 71010 DX-CHEST-LIMITED (1 VIEW): CPT

## 2017-04-05 PROCEDURE — 700102 HCHG RX REV CODE 250 W/ 637 OVERRIDE(OP): Performed by: SURGERY

## 2017-04-05 PROCEDURE — 94760 N-INVAS EAR/PLS OXIMETRY 1: CPT

## 2017-04-05 PROCEDURE — 770001 HCHG ROOM/CARE - MED/SURG/GYN PRIV*

## 2017-04-05 PROCEDURE — 700112 HCHG RX REV CODE 229: Performed by: SURGERY

## 2017-04-05 RX ADMIN — ENOXAPARIN SODIUM 30 MG: 100 INJECTION SUBCUTANEOUS at 21:26

## 2017-04-05 RX ADMIN — DOCUSATE SODIUM 100 MG: 100 CAPSULE ORAL at 21:25

## 2017-04-05 RX ADMIN — NICOTINE 14 MG: 14 PATCH TRANSDERMAL at 05:33

## 2017-04-05 RX ADMIN — STANDARDIZED SENNA CONCENTRATE AND DOCUSATE SODIUM 1 TABLET: 8.6; 5 TABLET, FILM COATED ORAL at 21:25

## 2017-04-05 RX ADMIN — FAMOTIDINE 20 MG: 20 TABLET, FILM COATED ORAL at 21:25

## 2017-04-05 RX ADMIN — OXYCODONE HYDROCHLORIDE 10 MG: 10 TABLET ORAL at 13:17

## 2017-04-05 RX ADMIN — OXYCODONE HYDROCHLORIDE 10 MG: 10 TABLET ORAL at 08:46

## 2017-04-05 RX ADMIN — OXYCODONE HYDROCHLORIDE 10 MG: 10 TABLET ORAL at 02:23

## 2017-04-05 RX ADMIN — DOCUSATE SODIUM 100 MG: 100 CAPSULE ORAL at 09:00

## 2017-04-05 RX ADMIN — FAMOTIDINE 20 MG: 20 TABLET, FILM COATED ORAL at 09:00

## 2017-04-05 RX ADMIN — OXYCODONE HYDROCHLORIDE 10 MG: 10 TABLET ORAL at 21:41

## 2017-04-05 RX ADMIN — OXYCODONE HYDROCHLORIDE 10 MG: 10 TABLET ORAL at 18:39

## 2017-04-05 RX ADMIN — OXYCODONE HYDROCHLORIDE 10 MG: 10 TABLET ORAL at 05:34

## 2017-04-05 RX ADMIN — ENOXAPARIN SODIUM 30 MG: 100 INJECTION SUBCUTANEOUS at 08:46

## 2017-04-05 ASSESSMENT — COPD QUESTIONNAIRES
HAVE YOU SMOKED AT LEAST 100 CIGARETTES IN YOUR ENTIRE LIFE: YES
DO YOU EVER COUGH UP ANY MUCUS OR PHLEGM?: NO/ONLY WITH OCCASIONAL COLDS OR INFECTIONS
COPD SCREENING SCORE: 2
DURING THE PAST 4 WEEKS HOW MUCH DID YOU FEEL SHORT OF BREATH: NONE/LITTLE OF THE TIME

## 2017-04-05 ASSESSMENT — ENCOUNTER SYMPTOMS
CHILLS: 0
SHORTNESS OF BREATH: 0
FOCAL WEAKNESS: 0
EYE PAIN: 0
FEVER: 0
NECK PAIN: 0
MYALGIAS: 1
HEADACHES: 1
BACK PAIN: 1
EYE REDNESS: 0
ROS GI COMMENTS: BM 4/4
VOMITING: 0
DIZZINESS: 1
DOUBLE VISION: 0
NAUSEA: 0
ABDOMINAL PAIN: 0
COUGH: 0
BLURRED VISION: 1

## 2017-04-05 ASSESSMENT — LIFESTYLE VARIABLES
TOTAL SCORE: 0
HAVE PEOPLE ANNOYED YOU BY CRITICIZING YOUR DRINKING: NO
EVER HAD A DRINK FIRST THING IN THE MORNING TO STEADY YOUR NERVES TO GET RID OF A HANGOVER: NO
TOTAL SCORE: 0
EVER FELT BAD OR GUILTY ABOUT YOUR DRINKING: NO
CONSUMPTION TOTAL: INCOMPLETE
HAVE YOU EVER FELT YOU SHOULD CUT DOWN ON YOUR DRINKING: NO
DO YOU DRINK ALCOHOL: YES
TOTAL SCORE: 0

## 2017-04-05 ASSESSMENT — PAIN SCALES - GENERAL
PAINLEVEL_OUTOF10: 7
PAINLEVEL_OUTOF10: 7
PAINLEVEL_OUTOF10: 9
PAINLEVEL_OUTOF10: 10
PAINLEVEL_OUTOF10: 9
PAINLEVEL_OUTOF10: 6
PAINLEVEL_OUTOF10: 8

## 2017-04-05 ASSESSMENT — PATIENT HEALTH QUESTIONNAIRE - PHQ9
1. LITTLE INTEREST OR PLEASURE IN DOING THINGS: NOT AT ALL
2. FEELING DOWN, DEPRESSED, IRRITABLE, OR HOPELESS: NOT AT ALL
SUM OF ALL RESPONSES TO PHQ9 QUESTIONS 1 AND 2: 0
SUM OF ALL RESPONSES TO PHQ QUESTIONS 1-9: 0

## 2017-04-05 NOTE — PROGRESS NOTES
Pt up and down in room to BR to void and reports a.m. BM, but stool sample was not taken.  Pt rates pain consistently 7-10/10 taking Oxy 10mg w/minor stated relief and one x Morphine 2mg IV for better relief.  Pt has a sling on LUE to assist w/comfort as well as ice and heat used.  Pt has a sitter 1:1, no visitors this shift.  Pt up ambulating w/FWW w/sitter.

## 2017-04-05 NOTE — PROGRESS NOTES
"Pt sitting up in bed, able to amb to BR to void, no BM reported at this time.  Pt continues to rate pain 8/10 in her rib cage area, as well as LUE shoulder. Pt encouraged to use sling/pillow to support LUE.  Pt verbalized understanding.  Pt requests PRN pain medication taking Oxy 10mg for relief rating of 7/10.  Pt's demeanor, body language and position, verbal output does not exhibit such a pain rating.  Re-educated pt on pain scale to clarify.  Pt verbalized understanding.  Pt is amb in the hallway using FWW, sitter present at all times.  Pt is nibbling on meals off and on throughout the day.  Pt has been refusing bowel medications, education provided to pt on need to take \"something\" to promote a regular stool and dangers of not.  Pt verbalized understanding and did take one ordered bowel medication  "

## 2017-04-05 NOTE — PROGRESS NOTES
"  Trauma/Surgical Progress Note    Author: Karen Walden Date & Time created: 4/5/2017   4:52 PM     Interval Events:  Psych court today - legal hold continued  Patient extremely frustrated and continues to deny suicidal ideation/attempt  Diagnostic imaging confirms left acromioclavicular separation, likely grade 3  Splint for comfort  Continue therapies     Review of Systems   Constitutional: Negative for fever and chills.   HENT:        Jaw pain, left facial pain / pressure   Eyes: Positive for blurred vision. Negative for double vision, pain and redness.        Wears glasses  Left eye diplopia   Respiratory: Negative for cough and shortness of breath.    Cardiovascular: Positive for chest pain. Negative for leg swelling.        Rib fractures   Gastrointestinal: Negative for nausea, vomiting and abdominal pain.        BM 4/4   Genitourinary: Negative for dysuria, urgency and frequency.        Voiding   Musculoskeletal: Positive for myalgias, back pain and joint pain. Negative for neck pain.        Right rib cage, mid back pain.  Left ankle mild pain.   Skin: Negative.    Neurological: Positive for dizziness and headaches. Negative for focal weakness.        With abrupt standing   Psychiatric/Behavioral: Negative for suicidal ideas.        Denies suicidal ideas.     Hemodynamics:  Blood pressure 109/70, pulse 64, temperature 36.6 °C (97.9 °F), resp. rate 18, height 1.626 m (5' 4\"), weight 74 kg (163 lb 2.3 oz), last menstrual period 03/25/2017, SpO2 93 %, not currently breastfeeding.     Respiratory:    Respiration: 18, Pulse Oximetry: 93 %        RUL Breath Sounds: Clear, RML Breath Sounds: Clear, RLL Breath Sounds: Diminished, LUIS M Breath Sounds: Clear, LLL Breath Sounds: Diminished  Fluids:    Intake/Output Summary (Last 24 hours) at 04/05/17 1652  Last data filed at 04/05/17 1500   Gross per 24 hour   Intake   1360 ml   Output      0 ml   Net   1360 ml     Admit Weight: 74.844 kg (165 lb)  Current      Physical " Exam   Constitutional: She is oriented to person, place, and time. She appears well-developed. No distress.   HENT:   Head: Normocephalic.   Left periorbital ecchymosis. Minimal swelling.   Eyes: Conjunctivae are normal.   Neck: Neck supple. No JVD present. No tracheal deviation present.   Cardiovascular: Normal rate, regular rhythm and intact distal pulses.        Pulmonary/Chest: Effort normal and breath sounds normal. No respiratory distress. She exhibits tenderness.   Left rib pain   Abdominal: Soft. Bowel sounds are normal. She exhibits no distension. There is no tenderness. There is no rebound.   Left abdominal / flank bruising - tender.   Musculoskeletal: Normal range of motion. She exhibits edema and tenderness.   Left ankle and shoulder tenderness  Sling at bedside   Neurological: She is alert and oriented to person, place, and time.   Skin: Skin is warm and dry. She is not diaphoretic.   Psychiatric: She has a normal mood and affect.   Anxious and frustrated   Nursing note and vitals reviewed.      Medical Decision Making/Problem List:    Active Hospital Problems    Diagnosis   • Suicidal behavior [R46.89]     Priority: High     Possible fall was intentional  Legal 2000 placed by police  3/28 - Legal hold extended  4/5 - Psych court, legal hold continued  Mabel Simpson MD. Psychiatry     • Multiple facial fractures (CMS-[facility]) [S02.92XA]     Priority: Medium     Comminuted fracture of lateral wall of left maxillary sinus. Fracture of anterolateral wall of the left maxillary sinus extending into inferior and lateral wall of left orbit which is slightly displaced. Left zygomatic arch fracture.  Nonoperative management at this time. Follow up after swelling decreases.  Blayne Wilhelm MD - Facial Surgeon.     • Closed fracture of multiple ribs of left side [S22.42XA]     Priority: Medium     Left second through ninth rib fractures with flail segment of third and fourth ribs.  Aggressive pulmonary hygiene, pain  control and radiographic studies.     • Fall [W19.XXXA]     Priority: Low     Fall from second story.     • Left shoulder pain [M25.512]     Priority: Low     Associated with progressively worsening left extremity numbness.  Normal ROM  4/4 - Imaging with left acromioclavicular separation which is likely grade 3  Per ortho recommendations - sling for comfort     • Acute left ankle pain [M25.572]     Priority: Low     Swelling and ecchymosis   Diagnostic imaging negative for acute fracture   Ace wrap for comfort and support     • Diplopia [H53.2]     Priority: Low     Complaining of visual disturbance.  Likely secondary to facial/orbital fractures and orbital edema vs decompensated phoria  Nonoperative management: Patch either eye.  Re-check in office 1 month.   Haim Hernandez MD. Opthalmology     • Splenic laceration [S36.039A]     Priority: Low     Grade III splenic laceration. Splenic fracture involving hilum with hazy central density suggesting active extravasation.   Serial abdominal exam and laboratory studies stable.     • Closed fracture of thoracic vertebra (CMS-HCC) [S22.009A]     Priority: Low     Left second through sixth transverse process fractures. Bony projection into spinal canal at T7 resulting component of canal narrowing. Sclerosis and slight expansion of the posterior elements of T1 and C7.   MRI spine most consistent with an old calcified disc, incidental finding from the trauma presentation.  Nonoperative management. No bracing needed.   Follow up in 8 weeks with repeat MRI imaging.(5/20)   Damián Felipe MD. Neurosurgeon.     • Alcohol intoxication (CMS-HCC) [F10.129]     Priority: Low     Admit BA 0.22  3/13 - SBIRT completed     • Inadequate anticoagulation [Z51.81, Z79.01]     Priority: Low     Prophylactic Lovenox initially contraindicated due to elevated bleeding risk from spleen injury.  RAP score 8  3/27 - Prophylactic Lovenox initiated  Trauma duplex as clinically indicated       Core  Measures & Quality Metrics:  Labs reviewed, Medications reviewed and Radiology images reviewed  Walden catheter: No Walden      DVT Prophylaxis: Enoxaparin (Lovenox)  DVT prophylaxis - mechanical: SCDs  Ulcer prophylaxis: Yes    Assessed for rehab: Patient was assess for and/or received rehabilitation services during this hospitalization    Total Score: 8  ETOH Screening  CAGE Score: 0  Intervention complete date: 3/31/2017  Patient response to intervention: Denies illicit drug/alcohol abuse. Smokes 1 pack of cigarettes a day. .   Patient demonstrats understanding of intervention.Plan of care: patient refused substance abuse cessation resources     has not been contacted.Follow up with: PCP and Clinic  Total ETOH intervention time: 15 - 30 mintues       Discussed patient condition with Family, RN, Patient and trauma surgery. Dr. Hernandez

## 2017-04-05 NOTE — PROGRESS NOTES
Pt amb w/sitter to [address], Family Room for court meeting to continue hold.  Hold is continued for one week.  Pt is crying and very upset.  Pt talking on phone

## 2017-04-05 NOTE — THERAPY
"Occupational Therapy Treatment completed with focus on ADLs, ADL transfers and patient education.  Functional Status:  Pt seen for OT tx today.  Pt was pleasant and cooperative throughout the session but continues to be limited by pain, weakness, endurance, and self care.  Pt completed supine to sit, sit to stand, room ambulation using FWW from bed to toilet with CGA.  Due to pain pt needs extra time but is able to complete gr/hy standing and toileting with CGA using FWW for support.  Pt is mod A for dressing due to pain.  Educated on AE and given a handout. Also given HEP for FMS per pt request due to decreased coordination in L hand during ADLs.  Pt also had concerns about  Pt would benefit from continued inpt OT to increase independence with functional transfers, functional endurance, and ADLs.  Plan of Care: Will benefit from Occupational Therapy 3 times per week  Discharge Recommendations:  Equipment Will Continue to Assess for Equipment Needs. Post-acute therapy Discharge to a transitional care facility for continued skilled therapy services.    See \"Rehab Therapy-Acute\" Patient Summary Report for complete documentation.   "

## 2017-04-05 NOTE — PROGRESS NOTES
"Pt A&O x4. Restless, anxious, talkative.     Vitals: /77 mmHg  Pulse 70  Temp(Src) 36.5 °C (97.7 °F)  Resp 19  Ht 1.626 m (5' 4\")  Wt 74 kg (163 lb 2.3 oz)  BMI 27.99 kg/m2  SpO2 94%  LMP 03/25/2017 (Exact Date)  Breastfeeding? No    Pt rates pain 6 out of 10. Medicated for pain.    Neuro: IQBAL. LLE/ LUE weakness, 4/5 strength. LUE numbness, pt states numbness continues to radiate from left side to shoulder to LUE fingertips. Pt can move/ use left hand/ arm/ and fingers. Warm, BCR, withdrawals from painful stimuli.     Cardiac: Denies new onset of chest pain.    Vascular: Pulses 2+ BUE, BLE. Mild edema noted around bilateral orbitals, more so on the left eye. Left ankle swelling, trace.     Respiratory: Lungs sound diminished in bases. Cough strong. Denies SOB. Pulling 1750 on IS.     GI: Abdomen soft, rounded. + bowel sounds, + flatus, + BM today, loose, watery, stool softeners held. On regular diet, tolerating well. - nausea/ vomiting.    : Pt voiding adequately.      MSK: Pt up to bathroom self, no assistance required, tolerating well.    Integumentary: Generalized bruising/ swelling noted around face, eyes, LUE, LLE, and left trunk.    Labs noted.    Fall precautions in place: Bed locked in lowest position, Upper bed rails up, treaded socks in place, call light within reach, appropriate mobility signs in place, - bed alarm. Pt calls appropriately.     Pt updated on POC.     Pt on legal hold, 1:1 sitter at bedside continuously.   "

## 2017-04-05 NOTE — DISCHARGE PLANNING
Medical Social Work    Referral: Legal hold court     Intervention: Pt presented for legal hold evaluation with . Recommendation per  is pt will meet later today and meet with court MD's via telemedicine monitor. Updated unit SW and bedside RN.     Plan: As above, pt will need to present later this afternoon to S6 floor to meet with court MD's via telemedicine monitor. Will call bedside RN once time has been determined.

## 2017-04-05 NOTE — CARE PLAN
Problem: Safety  Goal: Will remain free from injury  Outcome: PROGRESSING AS EXPECTED  Pt has a 1:1 sitter at bedside continuously to make sure the patient remains free from injury.   Goal: Will remain free from falls  Outcome: PROGRESSING AS EXPECTED  Intervention: Assess risk factors for falls    04/04/17 2000   OTHER   Fall Risk High Risk to Fall - 2 or more points    Risk for Injury-Any positive answers results in the pt being at high risk for fall related injury Alcohol Abuse   Mobility Status Assessment 1-1 Healthcare Provider Required for Assistance with Ambulation & Transfer   History of fall 2       Intervention: Implement fall precautions    04/04/17 2000   OTHER   Environmental Precautions Treaded Slipper Socks on Patient;Personal Belongings, Wastebasket, Call Bell etc. in Easy Reach;Report Given to Other Health Care Providers Regarding Fall Risk;Bed in Low Position;Communication Sign for Patients & Families;Mobility Assessed & Appropriate Sign Placed   Bedrails Bedrails Closest to Bathroom Down

## 2017-04-05 NOTE — PROGRESS NOTES
"Pt crying, family/children in room.  Pt told sitter to go outside and refused VS from CNA.  Pt very upset about court stating \"only my head was hurt\" and stated her whole body on her left side is hurt.  Attempted to calm pt down and trauma doc called to say she was on her way up to see pt.  Encouraged pt to discuss pt's injuries with the trauma team.  Pt verbalized understanding  "

## 2017-04-06 VITALS
DIASTOLIC BLOOD PRESSURE: 75 MMHG | BODY MASS INDEX: 27.85 KG/M2 | WEIGHT: 163.14 LBS | HEIGHT: 64 IN | OXYGEN SATURATION: 96 % | RESPIRATION RATE: 16 BRPM | SYSTOLIC BLOOD PRESSURE: 103 MMHG | HEART RATE: 78 BPM | TEMPERATURE: 96.8 F

## 2017-04-06 LAB
ALBUMIN SERPL BCP-MCNC: 3.8 G/DL (ref 3.2–4.9)
ALBUMIN/GLOB SERPL: 1.1 G/DL
ALP SERPL-CCNC: 120 U/L (ref 30–99)
ALT SERPL-CCNC: 54 U/L (ref 2–50)
ANION GAP SERPL CALC-SCNC: 6 MMOL/L (ref 0–11.9)
AST SERPL-CCNC: 23 U/L (ref 12–45)
BASOPHILS # BLD AUTO: 0.4 % (ref 0–1.8)
BASOPHILS # BLD: 0.04 K/UL (ref 0–0.12)
BILIRUB SERPL-MCNC: 0.5 MG/DL (ref 0.1–1.5)
BUN SERPL-MCNC: 16 MG/DL (ref 8–22)
CALCIUM SERPL-MCNC: 9.3 MG/DL (ref 8.5–10.5)
CHLORIDE SERPL-SCNC: 101 MMOL/L (ref 96–112)
CO2 SERPL-SCNC: 29 MMOL/L (ref 20–33)
CREAT SERPL-MCNC: 0.72 MG/DL (ref 0.5–1.4)
EOSINOPHIL # BLD AUTO: 0.12 K/UL (ref 0–0.51)
EOSINOPHIL NFR BLD: 1.3 % (ref 0–6.9)
ERYTHROCYTE [DISTWIDTH] IN BLOOD BY AUTOMATED COUNT: 43.5 FL (ref 35.9–50)
GFR SERPL CREATININE-BSD FRML MDRD: >60 ML/MIN/1.73 M 2
GLOBULIN SER CALC-MCNC: 3.4 G/DL (ref 1.9–3.5)
GLUCOSE SERPL-MCNC: 106 MG/DL (ref 65–99)
HCT VFR BLD AUTO: 40.7 % (ref 37–47)
HGB BLD-MCNC: 13.4 G/DL (ref 12–16)
IMM GRANULOCYTES # BLD AUTO: 0.08 K/UL (ref 0–0.11)
IMM GRANULOCYTES NFR BLD AUTO: 0.9 % (ref 0–0.9)
LYMPHOCYTES # BLD AUTO: 3.4 K/UL (ref 1–4.8)
LYMPHOCYTES NFR BLD: 36.8 % (ref 22–41)
MAGNESIUM SERPL-MCNC: 2.3 MG/DL (ref 1.5–2.5)
MCH RBC QN AUTO: 31.2 PG (ref 27–33)
MCHC RBC AUTO-ENTMCNC: 32.9 G/DL (ref 33.6–35)
MCV RBC AUTO: 94.9 FL (ref 81.4–97.8)
MONOCYTES # BLD AUTO: 0.69 K/UL (ref 0–0.85)
MONOCYTES NFR BLD AUTO: 7.5 % (ref 0–13.4)
NEUTROPHILS # BLD AUTO: 4.92 K/UL (ref 2–7.15)
NEUTROPHILS NFR BLD: 53.1 % (ref 44–72)
NRBC # BLD AUTO: 0 K/UL
NRBC BLD AUTO-RTO: 0 /100 WBC
PHOSPHATE SERPL-MCNC: 4.4 MG/DL (ref 2.5–4.5)
PLATELET # BLD AUTO: 449 K/UL (ref 164–446)
PMV BLD AUTO: 9.8 FL (ref 9–12.9)
POTASSIUM SERPL-SCNC: 3.8 MMOL/L (ref 3.6–5.5)
PROT SERPL-MCNC: 7.2 G/DL (ref 6–8.2)
RBC # BLD AUTO: 4.29 M/UL (ref 4.2–5.4)
SODIUM SERPL-SCNC: 136 MMOL/L (ref 135–145)
WBC # BLD AUTO: 9.3 K/UL (ref 4.8–10.8)

## 2017-04-06 PROCEDURE — 700112 HCHG RX REV CODE 229: Performed by: SURGERY

## 2017-04-06 PROCEDURE — 83735 ASSAY OF MAGNESIUM: CPT

## 2017-04-06 PROCEDURE — 85025 COMPLETE CBC W/AUTO DIFF WBC: CPT

## 2017-04-06 PROCEDURE — 700111 HCHG RX REV CODE 636 W/ 250 OVERRIDE (IP): Performed by: SURGERY

## 2017-04-06 PROCEDURE — A9270 NON-COVERED ITEM OR SERVICE: HCPCS | Performed by: SURGERY

## 2017-04-06 PROCEDURE — 94669 MECHANICAL CHEST WALL OSCILL: CPT

## 2017-04-06 PROCEDURE — 36415 COLL VENOUS BLD VENIPUNCTURE: CPT

## 2017-04-06 PROCEDURE — 84100 ASSAY OF PHOSPHORUS: CPT

## 2017-04-06 PROCEDURE — 80053 COMPREHEN METABOLIC PANEL: CPT

## 2017-04-06 PROCEDURE — 94760 N-INVAS EAR/PLS OXIMETRY 1: CPT

## 2017-04-06 PROCEDURE — 700102 HCHG RX REV CODE 250 W/ 637 OVERRIDE(OP): Performed by: SURGERY

## 2017-04-06 PROCEDURE — 700102 HCHG RX REV CODE 250 W/ 637 OVERRIDE(OP): Performed by: NURSE PRACTITIONER

## 2017-04-06 PROCEDURE — A9270 NON-COVERED ITEM OR SERVICE: HCPCS | Performed by: NURSE PRACTITIONER

## 2017-04-06 PROCEDURE — 94668 MNPJ CHEST WALL SBSQ: CPT

## 2017-04-06 RX ORDER — PSEUDOEPHEDRINE HCL 30 MG
100 TABLET ORAL 2 TIMES DAILY
Qty: 60 CAP | COMMUNITY
Start: 2017-04-06 | End: 2020-05-03

## 2017-04-06 RX ORDER — OXYCODONE HYDROCHLORIDE 10 MG/1
5-10 TABLET ORAL
Qty: 42 TAB | Refills: 0 | Status: SHIPPED | OUTPATIENT
Start: 2017-04-06 | End: 2020-05-03

## 2017-04-06 RX ORDER — MIRTAZAPINE 30 MG/1
7.5 TABLET, FILM COATED ORAL
Status: DISCONTINUED | OUTPATIENT
Start: 2017-04-06 | End: 2017-04-06 | Stop reason: HOSPADM

## 2017-04-06 RX ADMIN — FAMOTIDINE 20 MG: 20 TABLET, FILM COATED ORAL at 10:19

## 2017-04-06 RX ADMIN — DOCUSATE SODIUM 100 MG: 100 CAPSULE ORAL at 10:19

## 2017-04-06 RX ADMIN — ENOXAPARIN SODIUM 30 MG: 100 INJECTION SUBCUTANEOUS at 10:19

## 2017-04-06 RX ADMIN — NICOTINE 14 MG: 14 PATCH TRANSDERMAL at 05:38

## 2017-04-06 RX ADMIN — OXYCODONE HYDROCHLORIDE 10 MG: 10 TABLET ORAL at 16:40

## 2017-04-06 RX ADMIN — OXYCODONE HYDROCHLORIDE 10 MG: 10 TABLET ORAL at 13:46

## 2017-04-06 RX ADMIN — OXYCODONE HYDROCHLORIDE 10 MG: 10 TABLET ORAL at 03:25

## 2017-04-06 RX ADMIN — OXYCODONE HYDROCHLORIDE 10 MG: 10 TABLET ORAL at 06:21

## 2017-04-06 RX ADMIN — OXYCODONE HYDROCHLORIDE 10 MG: 10 TABLET ORAL at 10:19

## 2017-04-06 RX ADMIN — POLYETHYLENE GLYCOL 3350 1 PACKET: 17 POWDER, FOR SOLUTION ORAL at 10:20

## 2017-04-06 ASSESSMENT — ENCOUNTER SYMPTOMS
CONSTIPATION: 0
FOCAL WEAKNESS: 0
CHILLS: 0
EYE PAIN: 0
BLURRED VISION: 1
VOMITING: 0
ABDOMINAL PAIN: 0
COUGH: 0
NECK PAIN: 0
SHORTNESS OF BREATH: 0
NAUSEA: 0
DIZZINESS: 1
DOUBLE VISION: 0
BACK PAIN: 1
MYALGIAS: 1
ROS GI COMMENTS: BM 4/4
HEADACHES: 0
EYE REDNESS: 0
FEVER: 0

## 2017-04-06 ASSESSMENT — PAIN SCALES - GENERAL
PAINLEVEL_OUTOF10: 6
PAINLEVEL_OUTOF10: 4
PAINLEVEL_OUTOF10: 6
PAINLEVEL_OUTOF10: 4
PAINLEVEL_OUTOF10: 9
PAINLEVEL_OUTOF10: 7

## 2017-04-06 NOTE — PROGRESS NOTES
"/75 mmHg  Pulse 81  Temp(Src) 36 °C (96.8 °F)  Resp 16  Ht 1.626 m (5' 4\")  Wt 74 kg (163 lb 2.3 oz)  BMI 27.99 kg/m2  SpO2 98%  LMP 03/25/2017 (Exact Date)  Breastfeeding? No    Detailed discharge instructions provided to patient  Tertiary survey complete without additional findings  PCP established and appointment arranged for patient on 4/10/17  "

## 2017-04-06 NOTE — PROGRESS NOTES
Assumed care of patient at 0700. Patient intially had 1:1 sitter present with active legal hold. MD at bedside this AM and discontinued legal hold, 1:1 sitter no longer present. Patient denies any suicidal ideation. VSS. Tolerating regular diet. Voiding in BR. Small BM today. Up with stand by assist, FWW. 10 mg oxycodone given for pain with stated relief. Plan is for possible discharge to home later today. Will continue to monitor.

## 2017-04-06 NOTE — DISCHARGE PLANNING
Medical Social Work    Referral: Discharge today    Intervention: Per APN, plan is for pt to discharge home today. Per pt, she has a lot of family/friend support at home to assist her at home. She plans to call family/friends to come pick her up today. FWW needed for discharge, no other DME recommended. MD order has been placed. Pt would like to send referral to [facility]. Faxed choice to TANIYA Ortega @ 1925. Received fax confirmation.     Pt requesting PCP apt. Called hospital   and they will call back with an established PCP apt before pt discharges.     Plan: Awaiting FWW to be delivered to bedside and PCP apt arranged.

## 2017-04-06 NOTE — CARE PLAN
Problem: Venous Thromboembolism (VTW)/Deep Vein Thrombosis (DVT) Prevention:  Goal: Patient will participate in Venous Thrombosis (VTE)/Deep Vein Thrombosis (DVT)Prevention Measures  Outcome: PROGRESSING AS EXPECTED  Pt ambulates to hallway with FWW and standby assist.     Problem: Bowel/Gastric:  Goal: Normal bowel function is maintained or improved  Outcome: PROGRESSING AS EXPECTED  BS x 4 normoactive. Pt tolerating diet without having nausea and vomiting.

## 2017-04-06 NOTE — DISCHARGE INSTRUCTIONS
Discharge Instructions    Discharged to home by car with relative. Discharged via walking, hospital escort: Refused.  Special equipment needed: Walker    Be sure to schedule a follow-up appointment with your primary care doctor or any specialists as instructed.     Discharge Plan:   Diet Plan: Discussed  Activity Level: Discussed  Smoking Cessation Offered: Patient Refused  Confirmed Follow up Appointment: Patient to Call and Schedule Appointment  Confirmed Symptoms Management: Discussed  Medication Reconciliation Updated: Yes  Influenza Vaccine Indication: Patient Refuses    I understand that a diet low in cholesterol, fat, and sodium is recommended for good health. Unless I have been given specific instructions below for another diet, I accept this instruction as my diet prescription.   Other diet: regular    Special Instructions: Call or seek medical attention for questions or concerns   - Follow up with Dr. Hernandez in 1 weeks time   - Follow up with Dr. Wilhelm in 1 weeks time for facial fracture follow-up   - Follow up with Dr. Hernandez in 2 weeks for opthalmology follow-up   - Follow up with Dr. Felipe on 5/20 for repeat MRI of spine   - Follow up with primary care provider within one weeks time   - Resume regular diet   - Continue daily over the counter stool softener while on narcotics   - No operation of machinery or motorized vehicles while under the influence of narcotics   - No alcohol use while under the influence of narcotics   - No swimming, hot tubs, baths or wound submersion until cleared by outpatient provider. May shower   - No contact sports, strenuous activities, or heavy lifting until cleared by outpatient provider   - If respiratory decompensation, changes in neurologic status, changes in vision, uncontrolled pain, or signs or symptoms of infection occur seek medical attention    · Is patient discharged on Warfarin / Coumadin?   No     · Is patient Post Blood Transfusion?  No    Depression /  Suicide Risk    As you are discharged from this [facility] facility, it is important to learn how to keep safe from harming yourself.    Recognize the warning signs:  · Abrupt changes in personality, positive or negative- including increase in energy   · Giving away possessions  · Change in eating patterns- significant weight changes-  positive or negative  · Change in sleeping patterns- unable to sleep or sleeping all the time   · Unwillingness or inability to communicate  · Depression  · Unusual sadness, discouragement and loneliness  · Talk of wanting to die  · Neglect of personal appearance   · Rebelliousness- reckless behavior  · Withdrawal from people/activities they love  · Confusion- inability to concentrate     If you or a loved one observes any of these behaviors or has concerns about self-harm, here's what you can do:  · Talk about it- your feelings and reasons for harming yourself  · Remove any means that you might use to hurt yourself (examples: pills, rope, extension cords, firearm)  · Get professional help from the community (Mental Health, Substance Abuse, psychological counseling)  · Do not be alone:Call your Safe Contact- someone whom you trust who will be there for you.  · Call your local CRISIS HOTLINE 314-2045 or 254-175-6366  · Call your local Children's Mobile Crisis Response Team [place] (177) 951-1953 or www.Yoolink  · Call the toll free National Suicide Prevention Hotlines   · National Suicide Prevention Lifeline 142-037-LWIC (6550)  · National Hope Line Network 800-SUICIDE (841-3475)    Fracture Care, Generic  The 206 bones in our body are important for supporting our body (skeleton) and also for production of blood cells by the bone marrow. A fracture is a break in a tissue. A tissue is a collection of cells that performs a function or job in our body. We most commonly think of fractures in bones.  When a bone is broken, or fractured, it affects not only blood production  and function. There may also be other damage when structures near the bones are injured.  There are three main types of fractures:  · Open - where there is a wound leading to the fracture site or the bone is protruding from the skin.   · Closed - where the bone has fractured but has no external wound.   · Complicated - this may involve damage to associated vital organs and major blood vessels as a result of the fracture.   Fractures are usually managed by keeping the bones in place long enough for them to heal. This is usually done with splints or casts. Sometimes surgery is required and pins, plates and screws may be used to hold fractures in proper position. The amount of time it takes a fracture to heal depends mostly on the age and health of the patient.  Young children are prone to fractures. These fractures heal rather quickly. The common fractures suffered by children tend to be associated with the arms and wrists. As young bones do not harden for some years, children's fractures tend to 'bend and splinter', similar to a broken branch on a tree. This the reason for the name, 'greenstick fracture'.  As we grow older, there may be a loss of bone known as osteopenia or osteoporosis. These conditions make breaking a bone much easier. Sometimes a minor accident or simply over-use may produce a fracture. These fractures do not heal as fast a younger person's.  SYMPTOMS  The signs and symptoms of fractures of bones depend on how bad the injury is. If shock is present, there may be pale, cool, clammy skin with a rapid, weak pulse. There is usually pain and tenderness in the area of the fracture. There may or may not be deformity of the bone. There may be injury to surrounding tissues.  TREATMENT  · Care and treatment of fractures relies on immobilization and adequate splinting of the injury. If the fracture is complex, the wound associated with an open fracture may be difficult to handle without professional help.    · If the pulse to the end part of the limb (distal pulse) cannot be restored by gentle traction, then the limb should be stabilized in its current position. Urgent ambulance transport should be obtained. Do not waste time with splinting.   · Generally, fractured limbs should be made immobile and left for medical aid. In remote areas or some distance from medical aid, you may be required to treat as follows.   FRACTURED FOREARM  · Check for pulse at the end part of the limb. If none - gentle traction until pulse returns   · Treat any wounds   · Pad bony prominences   · Apply adequate splinting.   · Secure above and below fracture, secure wrist.   · Reassess pulse or return of color and/or warmth.   · Elevate injury with arm sling.    FRACTURED UPPER ARM  · Check for pulse at the end part of the limb, if none - gentle traction until pulse returns.   · Treat any wounds.   · Pad between arm and chest.   · Apply 'collar and cuff' sling, secure above and below fracture firmly against chest with triangular bandages.   · Reassess pulse or return of color and/or warmth.    FRACTURED LEG  · Check for circulation and pulse at the end part of the limb (skin color and temperature). If no circulation, apply gentle traction until pulse or color returns.   · Call '911' for an ambulance.   · Treat any wounds.   · Immobilize (keep it from moving) the limb.   · Pad bony prominences.   · Reassess circulation below injury.   FRACTURED PELVIS  · Call '911' for an ambulance.   · Check for pulses in both legs.   · Bend legs at knees, elevate lower legs slightly and support on pillows or something padded.   · Support both hips with folded blankets either side.   · Discourage attempts to urinate.   · Care must be exercised with a suspected fractured pelvis. This injury may have serious complications. The casualty should always be transported by ambulance and not by alternative means unless absolutely necessary.   FRACTURED JAW  · A common  injury in certain contact sports is dislocation, or fracture of the lower jaw (mandible). The casualty will have pain in the jaw, be unable to speak properly, and may have trouble swallowing.   · Call '911' for an ambulance.   · Support the jaw.   · Sit the injured person leaning slightly forward.   · Rest the injured jaw on a pad held by the injured person.   · DO NOT apply a bandage to support the jaw.   · Observe the casualty carefully for signs of breathing difficulties and any indication that he or she is becoming drowsy or unconscious.   SLINGS  · Slings are used to support an injured arm, or to supplement treatment for another injury such as fractured ribs. Generally, the most effective sling is made with a triangular bandage. Every first aid kit, no matter how small, should have at least one of these bandages.   · Although triangular bandages are preferable, any material (tie, belt, or piece of thick twine or rope) can be used in an emergency. If no likely material is at hand, an injured arm can be adequately supported by inserting it inside the injured person's shirt or blouse. Similarly, a safety pin applied to a sleeve and secured to clothing on the chest may work well enough.   · There are essentially three types of sling; the arm sling for injuries to the forearm, the elevated sling for injuries to the shoulder, and the 'collar-and-cuff' or clove hitch for injuries to the upper arm and as supplementary support to fractured ribs.   · After application of any sling, always check the circulation to the limb by feeling for the pulse at the wrist, or by squeezing a fingernail and observing for change of color in the nail bed. All slings must be in a position that is comfortable for the injured person. Never force an arm into the 'right position'.   ARM SLING  · Support the injured forearm approximately parallel to the ground with the wrist slightly higher than the elbow.   · Place an opened triangular  "bandage between the body and the arm, with its apex towards the elbow.   · Extend the upper point of the bandage over the shoulder on the uninjured side.   · Bring the lower point up over the arm, across the shoulder on the injured side to join the upper point and tie firmly with a reef knot.   · Ensure the elbow is secured by folding the excess bandage over the elbow and securing with a safety pin.   ELEVATED SLING  · Support the injured arm with the elbow beside the body and the hand extended towards the uninjured shoulder.   · Place an opened triangular bandage over the forearm and hand, with the apex towards the elbow.   · Extend the upper point of the bandage over the uninjured shoulder.   · Tuck the lower part of the bandage under the injured arm, bring it under the elbow and around the back and extend the lower point up to meet the upper point at the shoulder.   · Tie firmly with a reef knot.   · Secure the elbow by folding the excess material and applying a safety pin, then ensure that the sling is tucked under the arm giving firm support.   COLLAR-AND-CUFF (CLOVE HITCH)   · Allow the elbow to hang naturally at the side and place the hand extended towards the shoulder on the uninjured side.   · Form a clove hitch by forming two loops - one towards you, one away from you.   · Put the loops together by sliding your hands under the loops and closing with a \"clapping\" motion. If you are experienced at forming a clove hitch, then apply a clove hitch directly on the wrist, but take care not to move the injured arm.   · Slide the clove hitch over the hand and gently pull it firmly to secure the wrist.   · Extend the points of the bandage to either side of the neck and tie firmly with a reef knot.   · Allow the arm to hang comfortably. For support to fractured ribs, apply triangular bandages around the body and upper arm to hold the arm firmly against the chest.   If your caregiver has given you a follow-up " appointment, it is very important to keep that appointment. This includes any orthopedic referrals, physical therapy, and rehabilitation. Any delay in obtaining necessary care could result in a delay or failure of the bones to heal. Not keeping the appointment could result in a chronic or permanent injury, pain, and disability. If there is any problem keeping the appointment, you must call back to this facility for assistance.   Document Released: 12/22/2005 Document Revised: 03/11/2013 Document Reviewed: 07/24/2009  ExitCare® Patient Information ©2013 [facility].      Splenic Injury  A splenic injury is an injury of the spleen. The spleen is an organ located in the upper left area of your abdomen, just under your ribs. Your spleen filters and cleans your blood. It also stores blood cells and destroys cells that are worn out. Your spleen is also important for fighting disease.   Splenic injuries can vary. In some cases, the spleen may only be bruised with some bleeding inside the covering and around the spleen. Splenic injuries may also cause a deep tear or cut into the spleen (lacerated spleen). Some splenic injuries can cause the spleen to break open (rupture).  CAUSES   Splenic injuries can be caused by a direct blow (blunt trauma) from:  · Car accidents.  · Contact sports.  · Falls.  Gunshot wounds or knife wounds (penetrating injuries) can also cause a splenic injury.  RISK FACTORS  You may be at greater risk for a splenic injury if you have a disease that can cause the spleen to become enlarged. These include:  · Alcoholic liver disease.  · Viral infections, especially mononucleosis.  SIGNS AND SYMPTOMS   A minor splenic injury often causes no symptoms or only minor abdominal pain. If the injury causes severe bleeding, your blood pressure may rapidly decrease. This may cause:  · Dizziness or light-headedness.  · Rapid heart rate.  · Difficulty breathing.  · Fainting.  · Sweating with clammy skin.  Other  signs and symptoms of a splenic injury can include:  · Very bad abdominal pain.  · Pain in the left shoulder.  · Pain when the abdomen is pressed (tenderness).  · Nausea.  · Swelling or bruising of the abdomen.  DIAGNOSIS   Your health care provider may suspect a splenic injury based on your signs and symptoms, especially if you were recently in an accident or you recently got hurt. Your health care provider will do a physical exam. Imaging tests may be done to confirm the diagnosis. These may include:  · Ultrasound.  · CT scan.  You may have frequent blood tests for a few days after the injury to monitor your condition.   TREATMENT   Treatment depends on the type of splenic injury you have and how bad it is. Your health care provider will develop a treatment plan specific to your needs.  · Less severe injuries may be treated with:  ¨ Observation.  ¨ Interventional radiology. This involves using flexible tubes (catheters) to stop the bleeding from inside the blood vessel.    · More severe injuries may require hospitalization in the intensive care unit (ICU). While you are in the ICU:    ¨ Your fluid and blood levels will be monitored closely.  ¨ You will get fluids through an IV tube as needed.    ¨ You may need follow-up scans to check whether your spleen is able to heal itself. If the injury is getting worse, you may need surgery.  ¨ You may receive donated blood (transfusion).  ¨ You may have a long needle inserted into your abdomen to remove any blood that has collected inside the spleen (hematoma).  · Surgery. If your blood pressure is too low, you may need emergency surgery. This may include:  ¨ Repairing a laceration.  ¨ Removing part of the spleen.  ¨ Removing the entire spleen (splenectomy).  HOME CARE INSTRUCTIONS  · Take medicines only as directed by your health care provider.  · Rest at home.  · Do not participate in any strenuous activity until your health care provider says it is safe to do so.  · Do  not lift anything that is heavier than 10 lb (4.5 kg).  · Do not participate in contact sports until your health care provider says it is safe to do so.  SEEK MEDICAL CARE IF:  · You have a fever.  · You have new or increasing pain in your abdomen or in your left shoulder.  SEEK IMMEDIATE MEDICAL CARE IF:  · You have signs or symptoms of internal bleeding. Watch for:  ¨ Sweating.  ¨ Dizziness.  ¨ Weakness.  ¨ Cold and clammy skin.  ¨ Fainting.  · You have chest pain or difficulty breathing.      This information is not intended to replace advice given to you by your health care provider. Make sure you discuss any questions you have with your health care provider.     Document Released: 10/09/2007 Document Revised: 01/08/2016 Document Reviewed: 09/02/2015  [employer] Patient Education ©2016 [employer]

## 2017-04-06 NOTE — DISCHARGE PLANNING
Medical Social Work    Referral: Legal hold extended      Intervention: Pt presented to legal court yesterday and legal hold was continued for one week. (04/12/17).     Plan: [facility] to send referrals to local psych facilities, once extension paperwork obtained.

## 2017-04-06 NOTE — PROGRESS NOTES
"Received report from day shift RN. Pt A&O x 4. 1:1 sitter at bedside. C/o pain at 7/10. Medicated per MAR. Pt denies nausea, vomiting, chest pain, shortness of breath at this time. +BM, +void. Pt's friends at bedside. Pt sitting up in bed. Pt states \"I am still upset about legal hold, but I feel better than earlier\". All needs are met at this time. Call light within reach. Bed locked and in lowest position.   "

## 2017-04-06 NOTE — PSYCHIATRY
"PSYCHIATRIC FOLLOW UP:    Reason for Admission: status post falling from two stories above the ground. Law enforcement states patient jumped out of the window as a suicidal attempt.  Legal hold status: +, now dc'd       Consistent in saying this was not a SA, that her dogs are her \"babies\" (showed me more pictures), that she has supportive friends and is able to stay for the month of April in her apt. She has lost one job but still has the other although it won't be enough to support the cost of her rent. She smiles, is very appropriate in her interaction, talks of future plans, problem solves. Has pain on movement but participating in therapy. This is also preventing her from sleeping well.    Psychiatric Examination: observed phenomenon:  Vitals:Blood pressure 103/75, pulse 81, temperature 36 °C (96.8 °F), resp. rate 16, height 1.626 m (5' 4\"), weight 74 kg (163 lb 2.3 oz), last menstrual period 03/25/2017, SpO2 98 %, not currently breastfeeding.  Musculoskeletal(abnormal movements, gait, etc): none noted but she reports her balance is still and issue and has pain on movement.  Appearance:good eye contact, physical discomfort less obvious than when first interviewed.   Thoughts:linear, no psychosis  Speech:wnl  Mood: \"good\"  Affect: euthymic  SI/HI: denies  Attention/Alertness: intact    Memory: intact  Orientation:x 4  Fund of Knowledge:good    Insight/Judgement into psychiatric symptoms: good         Assessment:(acuity level)  Alcohol Intoxication: resolved, cause of fall, not a SA.     Plan:will order remeron and see if it helps for sleep. Would like to go to counseling.  to provide referrals.   legal hold:dc'd     Will follow.            "

## 2017-04-06 NOTE — FACE TO FACE
Face to Face Note  -  Durable Medical Equipment    DAVID Dutton - NPI: [id number]  I certify that this patient is under my care and that they had a durable medical equipment(DME)face to face encounter by the nurse practitioner working collaboratively with me that meets the physician DME face-to-face encounter requirements with this patient on:    Date of encounter:   Patient:                    MRN:                       YOB: 2017  Kaela Rhodes  [id number]  1970     The encounter with the patient was in whole, or in part, for the following medical condition, which is the primary reason for durable medical equipment:  Other - Trauma    I certify that, based on my findings, the following durable medical equipment is medically necessary:  Wheel Chair.    HOME O2 Saturation Measurements:(Values must be present for Home Oxygen orders)         ,     ,         My Clinical findings support the need for the above equipment due to:  Abnormal Gait    Supporting Symptoms: Trauma resulting in abnormal gait, for safe discharge home

## 2017-04-06 NOTE — DISCHARGE PLANNING
Medical Social Work     Per psych, pt's legal hold discontinued this AM. Met w/ pt at bedside to discuss legal hold determination and provide pt with mental health counseling resources. Highlighted counseling agencies that contract with pt's insurance. She is agreeable to this plan. Awaiting treatment team recommendations for possible outpatient rehab and equipment needed for d/c. Will continue to follow.

## 2017-04-06 NOTE — DISCHARGE PLANNING
Medical Social Work    Referral: Legal hold court    Intervention: Pt presented for legal hold evaluation with . Recommendation per  is pt’s legal hold will be continued for one week (4/12/17). Updated bedside RN and unit SW.     Plan: As above, pt’s legal hold has been extended for one week (3/12/17). Unit SW to continue to pursue placement at inpatient  facility.

## 2017-04-06 NOTE — DISCHARGE PLANNING
Received DME choice from Perla([facility]) at 1230.  DME referral sent to [facility] at 1242.

## 2017-04-06 NOTE — PROGRESS NOTES
"  Trauma/Surgical Progress Note    Author: Karen Walden Date & Time created: 4/6/2017   1:35 PM     Interval Events:  Legal hold discontinued by Dr. Simpson  Outpatient PT/OT scripts provided  Medically cleared for discharge once patient receives walker  Disposition: home with family/friends     Review of Systems   Constitutional: Negative for fever and chills.   HENT:        Jaw pain, left facial pain / pressure   Eyes: Positive for blurred vision. Negative for double vision, pain and redness.        Wears glasses  Left eye diplopia   Respiratory: Negative for cough and shortness of breath.    Cardiovascular: Positive for chest pain. Negative for leg swelling.        Rib fractures   Gastrointestinal: Negative for nausea, vomiting, abdominal pain and constipation.        BM 4/4   Genitourinary: Negative for dysuria, urgency and frequency.        Voiding   Musculoskeletal: Positive for myalgias, back pain and joint pain. Negative for neck pain.        Right rib cage, mid back pain.  Left ankle mild pain.   Skin: Negative.    Neurological: Positive for dizziness. Negative for focal weakness and headaches.        With abrupt standing   Psychiatric/Behavioral: Negative for suicidal ideas.        Denies suicidal ideas.     Hemodynamics:  Blood pressure 103/75, pulse 81, temperature 36 °C (96.8 °F), resp. rate 16, height 1.626 m (5' 4\"), weight 74 kg (163 lb 2.3 oz), last menstrual period 03/25/2017, SpO2 98 %, not currently breastfeeding.     Respiratory:    Respiration: 16, Pulse Oximetry: 98 %, O2 Daily Delivery Respiratory : Room Air with O2 Available     PEP/CPT Method: Positive Airway Pressure Device, Work Of Breathing / Effort: Mild  RUL Breath Sounds: Clear, RML Breath Sounds: Clear, RLL Breath Sounds: Diminished, LUIS M Breath Sounds: Clear, LLL Breath Sounds: Diminished  Fluids:    Intake/Output Summary (Last 24 hours) at 04/06/17 1335  Last data filed at 04/06/17 0800   Gross per 24 hour   Intake    836 ml   Output  "     0 ml   Net    836 ml     Admit Weight: 74.844 kg (165 lb)  Current      Physical Exam   Constitutional: She is oriented to person, place, and time. She appears well-developed. No distress.   HENT:   Head: Normocephalic.   Left periorbital ecchymosis. Minimal swelling.   Eyes: Conjunctivae are normal.   Neck: Neck supple. No JVD present. No tracheal deviation present.   Cardiovascular: Normal rate, regular rhythm and intact distal pulses.    Pulmonary/Chest: Effort normal and breath sounds normal. No respiratory distress. She exhibits tenderness.   Left rib pain   Abdominal: Soft. Bowel sounds are normal. She exhibits no distension. There is no tenderness. There is no rebound.   Left abdominal / flank bruising - tender.   Musculoskeletal: Normal range of motion. She exhibits edema and tenderness.   Left ankle and shoulder tenderness  Sling at bedside   Neurological: She is alert and oriented to person, place, and time.   Skin: Skin is warm and dry. She is not diaphoretic.   Psychiatric: She has a normal mood and affect.   tearful   Nursing note and vitals reviewed.      Medical Decision Making/Problem List:    Active Hospital Problems    Diagnosis   • Suicidal behavior [R46.89]     Priority: High     Possible fall was intentional  Legal 2000 placed by police  3/28 - Legal hold extended  4/5 - Psych court, legal hold continued  4/6 - Legal hold discontinued by Dr. Calvin Simpson MD. Psychiatry     • Multiple facial fractures (CMS-HCC) [S02.92XA]     Priority: Medium     Comminuted fracture of lateral wall of left maxillary sinus. Fracture of anterolateral wall of the left maxillary sinus extending into inferior and lateral wall of left orbit which is slightly displaced. Left zygomatic arch fracture.  Nonoperative management at this time. Follow up after swelling decreases.   Blayne Wilhelm MD - Facial Surgeon.     • Closed fracture of multiple ribs of left side [S22.42XA]     Priority: Medium     Left  second through ninth rib fractures with flail segment of third and fourth ribs.  Aggressive pulmonary hygiene, pain control and radiographic studies.      • Fall [W19.XXXA]     Priority: Low     Fall from second story.     • Left shoulder pain [M25.512]     Priority: Low     Associated with progressively worsening left extremity numbness.  Normal ROM  4/4 - Imaging with left acromioclavicular separation which is likely grade 3  Per ortho recommendations - sling for comfort      • Acute left ankle pain [M25.572]     Priority: Low     Swelling and ecchymosis   Diagnostic imaging negative for acute fracture   Ace wrap for comfort and support      • Diplopia [H53.2]     Priority: Low     Complaining of visual disturbance.  Likely secondary to facial/orbital fractures and orbital edema vs decompensated phoria  Nonoperative management: Patch either eye.  Re-check in office 1 month.   Haim Hernandez MD. Opthalmology      • Splenic laceration [S36.039A]     Priority: Low     Grade III splenic laceration. Splenic fracture involving hilum with hazy central density suggesting active extravasation.   Serial abdominal exam and laboratory studies stable.      • Closed fracture of thoracic vertebra (CMS-HCC) [S22.009A]     Priority: Low     Left second through sixth transverse process fractures. Bony projection into spinal canal at T7 resulting component of canal narrowing. Sclerosis and slight expansion of the posterior elements of T1 and C7.   MRI spine most consistent with an old calcified disc, incidental finding from the trauma presentation.  Nonoperative management. No bracing needed.   Follow up in 8 weeks with repeat MRI imaging. (5/20)    Damián Felipe MD. Neurosurgeon.      • Alcohol intoxication (CMS-HCC) [F10.129]     Priority: Low     Admit BA 0.22  3/13 - SBIRT completed     • Inadequate anticoagulation [Z51.81, Z79.01]     Priority: Low     Prophylactic Lovenox initially contraindicated due to elevated bleeding risk  from spleen injury.  RAP score 8  3/27 - Prophylactic Lovenox initiated  Trauma duplex as clinically indicated        Core Measures & Quality Metrics:  Labs reviewed, Medications reviewed and Radiology images reviewed  Walden catheter: No Walden      DVT Prophylaxis: Enoxaparin (Lovenox)  DVT prophylaxis - mechanical: SCDs  Ulcer prophylaxis: Yes    Assessed for rehab: Patient was assess for and/or received rehabilitation services during this hospitalization    Total Score: 8  ETOH Screening  CAGE Score: 0  Intervention complete date: 3/31/2017  Patient response to intervention: Denies illicit drug/alcohol abuse. Smokes 1 pack of cigarettes a day. .   Patient demonstrats understanding of intervention.Plan of care: patient refused substance abuse cessation resources     has not been contacted.Follow up with: PCP and Clinic  Total ETOH intervention time: 15 - 30 mintues       Discussed patient condition with RN, Patient and trauma surgery. Dr. Hernandez

## 2017-04-07 NOTE — DISCHARGE SUMMARY
WDATE OF ADMISSION:  03/25/2017    DATE OF DISCHARGE:  04/6/2017    ATTENDING PHYSICIAN:  Dr. Jolly Hernandez, trauma surgery.    CONSULTING PHYSICIANS:  1.  Blayne Wilhelm, [facility], maxillofacial trauma.  2.  Dr. Damián Felipe, neurosurgery.  3.  Dr. Haim Hernandez, ophthalmology.  4.  Mabel Gonzales MD psychiatry.    DISCHARGE DIAGNOSES:  1.  Closed fracture of multiple ribs on the left side.  2.  Splenic laceration.  3.  Closed fracture of thoracic vertebrae.  4.  Alcohol intoxication.  5.  Multiple facial fractures.  6.  Suicidal behavior.  7.  Diplopia.  8.  Acute left ankle pain.  9.  Left shoulder pain.  10.  Traumatic pneumothorax.  11.  Hyponatremia.  12.  Fall.  13.  Inadequate anticoagulation.    PROCEDURES:  No procedures during this hospitalization.    HISTORY OF PRESENT ILLNESS:  This is a 46-year-old female who was apparently   intoxicated and fell off a second story balcony.  She was transferred to   [facility] for definitive trauma here.  She was initially   triaged as a trauma green and later upgraded to a trauma yellow in accordance   to establish pre-hospital protocols.    HOSPITAL COURSE:  On arrival, the patient underwent extensive radiographic and   laboratory studies and was admitted to the critical care team under the   direction and supervision of Dr. Hernandez.  She sustained the above injuries and   underwent the above procedures during her stay.  She was found to have   multiple facial fractures, a grade III splenic laceration, multiple rib   fractures and a small pneumothorax and thoracic spine fractures.    She was transferred from the emergency room to the intensive care unit for   serial laboratory studies and abdominal examinations.  She was found to have   left ribs 2 through 9 fractured with flail segment and third through fourth   rib fractures.  She received aggressive pulmonary hygiene, multi-modular pain   management and serial radiographic studies.  In  addition, she was found to   have a comminuted fracture of the lateral wall of the left maxillary sinus,   fractures of the anterolateral wall of the left maxillary sinus extending into   the inferior and lateral wall of the left orbit, which is slightly displaced   and the left zygomatic arch fracture.  Dr. Wilhelm was consulted for these   injuries.  She was treated nonoperatively and is to follow up with him when   swelling is decreased for further evaluation.  She was also found to have a   left 2nd through 6 transverse process fracture with bony projection into the   spinal canal at T7 level being confirmed as canal narrowing, sclerosis and   slight expansion of the posterior elements of T1 and C7.  An MRI was performed   which showed most consistent with an old calcified disk, incidental finding   from the trauma presentation.  Dr. Felipe was consulted for this injury.  The   patient was treated nonoperatively and did not need a brace.  She is to follow   up in 8 weeks for repeat MRI.  Patient was also found to have a small left   anterior lung base pneumothorax.  Repeat chest x-ray was done on 4/3/2017,   which showed moderate left subpulmonic effusion, basilar atelectasis and   possible consolidation, but no pneumothorax.    Given the patient's mechanism of injury, the police did initiate a legal 2000   upon arrival as this may have been a potential intentional fall.  Dr. Gonzales was consulted for this and the legal hold was extended.  The   patient presented to court on 4/5/2017 and the psychiatric hold was extended   and the patient was reevaluated by Dr. Gonzales on 4/6/2017.  At this   time, legal hold was discontinued as she felt the patient was not suicidal.    The patient complained of visual disturbances, likely secondary to facial and   orbital fractures and orbital edema versus decompensated phoria.  Dr. Hernandez   was consulted for this.  The patient was treated nonoperatively and  it was   recommended that the patient wear a patch to either eye and to follow up in 1   month.  The patient did experience hyponatremia through hospitalization.  She   received electrolyte replacement and her laboratory studies were trended   through her hospitalization and her sodium normalized.    The patient was medically stable for transfer from the intensive care unit to   the general surgical floor where she continued to progress and a tertiary exam   was performed at this time.  The patient complained of left ankle pain and   left shoulder pain.  Diagnostic imaging was performed for her acute left ankle   pain with swelling and ecchymosis.  Her diagnostic imaging was negative for   acute fracture.  An Ace wrap for comfort and support was provided for her left   shoulder pain with associated progressive worsening of left extremity   numbness.  Diagnostic imaging was performed and she was found to have a left   acromioclavicular separation, which is likely a grade III.  This was discussed   with the orthopedic team and the patient was placed in a sling for comfort   and to continue with physical therapy and occupational therapy.  The patient   was also found to have a blood alcohol level of 0.22 on admission to the   hospital.  A substance abuse screening was performed on 3/13/2017 and the   patient denied need for cessation information.    The patient was evaluated by physical therapy and occupational therapy who   recommended additional skilled therapy services upon discharge home.  She was   provided a script for outpatient physical therapy and occupational therapy in   addition to a front wheel walker upon discharge.    The patient continued to progress and on day of discharge, the patient is   ambulating independently with a walker.  She is reporting adequate pain   control.  She is tolerating a regular diet.  She is having regular bowel   movements and is able to void.  The patient's abdominal exam has  remained   stable and her hemoglobin on day of discharge was 13.4.  Chest x-ray has been   stable.  The patient is anxious about going home and has been provided lengthy   emotional support.    DISCHARGE PHYSICAL EXAM:  See Epic physical exam dated 04/06/2017.    DISCHARGE MEDICATIONS:  The patient's controlled substance history is reviewed   and the following medications are prescribed.  1.  Oxycodone immediate release 10 mg immediate release tablet to take 1/2-1   tab by mouth every 3 hours as needed for moderate pain, dispensed 42, refills   0.  2.  Colace 100 mg capsule, take 100 mg by mouth 3 times a day while on   narcotic pain medication, over-the-counter.  4.  The patient is instructed to resume all previously prescribed medications   as ordered except for ibuprofen.    DISPOSITION:  The patient will be discharged home under the care and   supervision of her family on 4/6/2017.  She will follow up with Dr. Leone in 1   week's time.  She will also follow with Dr. Felipe on 5/20/2017 for a repeat   MRI of her spinal injuries.  In addition, she will follow up with Dr. Wilhelm in 1 week's time and Dr. Hernandez in 1-2 weeks' time.  She also has   been provided a followup appointment with Dr. Montano to establish a primary   care provider on 4/10/2017.  The patient has been extensively counseled and   all questions have been answered.  Special attention was paid to the signs and   symptoms of infection, uncontrolled pain, changes in neurologic status or   suicidal ideation and to seek immediate medical attention if these develop.    The patient and family demonstrated understanding and good _____ compliance   with these discharge instructions.    Time spent on discharge 1 hour.       ____________________________________     MD WILLIE BRANCH / BARBIE    DD:  04/06/2017 15:20:06  DT:  04/06/2017 19:49:33    D#:  [id number]  Job#:  [id number]    cc: GABE FELIPE MD, EMIGDIO LEONE MD, MIRIAN HERNANDEZ MD, ARRON  LEO HERNANDEZ, HUA MONAHAN DMD,MD
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
## 2017-06-19 NOTE — ED NOTES
L facial swelling, pain x 2 weeks.  
Med po for pain.  
Pt to triage c/o left lower dental pain / jaw swelling , hx of same  
Verbalizes understanding of dc andf/u instructions.  RX x 2 in hand. Released amb to self.  
Discharged

## 2017-07-19 ENCOUNTER — APPOINTMENT (OUTPATIENT)
Dept: RADIOLOGY | Facility: MEDICAL CENTER | Age: 47
End: 2017-07-19
Attending: NEUROLOGICAL SURGERY
Payer: MEDICAID

## 2017-07-19 DIAGNOSIS — D49.2 NEOPLASM OF UNSPECIFIED NATURE OF BONE, SOFT TISSUE, AND SKIN: ICD-10-CM

## 2017-07-19 PROCEDURE — 72146 MRI CHEST SPINE W/O DYE: CPT

## 2018-11-03 NOTE — MR AVS SNAPSHOT
Kaela Rhodes   2/10/2017 11:50 AM   Non-Provider Visit   MRN: [id number]    Department:  [facility]   Dept Phone:  [phone]    Description:  Female : 1970   Provider:  [facility] DORIS DURAN RKENDY           Reason for Visit     Other [employer] Pre-employment DS       Allergies as of 2/10/2017     No Known Allergies      You were diagnosed with     Pre-employment drug screening   [731152]         Vital Signs     Smoking Status                   Current Every Day Smoker           Basic Information     Date Of Birth Sex Race Ethnicity Preferred Language    1970 Female White Non- English      Problem List              ICD-10-CM Priority Class Noted - Resolved    Chest pain R07.9   2016 - Present      Health Maintenance        Date Due Completion Dates    IMM DTaP/Tdap/Td Vaccine (1 - Tdap) 1989 ---    PAP SMEAR 1991 ---    MAMMOGRAM 2010 ---    IMM INFLUENZA (1) 2016 ---            Results     POCT 6 Panel Urine Drug Screen      Component    AMPHETAMINE    POC THC    COCAINE    OPIATES    PHENCYCLIDINE    METHAMPHETAMINES    POC Urine Drug Screen Comment    NEG    Internal Control Positive    Valid    Internal Control Negative    Valid                        Current Immunizations     No immunizations on file.      Below and/or attached are the medications your provider expects you to take. Review all of your home medications and newly ordered medications with your provider and/or pharmacist. Follow medication instructions as directed by your provider and/or pharmacist. Please keep your medication list with you and share with your provider. Update the information when medications are discontinued, doses are changed, or new medications (including over-the-counter products) are added; and carry medication information at all times in the event of emergency situations     Allergies:  No Known Allergies          Medications  Valid as of: February 10, 2017 -  12:41 PM    Generic Name Brand Name Tablet Size Instructions for use    Dicyclomine HCl (Tab) BENTYL 20 MG Take 1 Tab by mouth every 6 hours.        Ibuprofen (Tab) MOTRIN 200 MG Take 800 mg by mouth 2 Times a Day.        Ibuprofen (Tab) MOTRIN 600 MG Take 1 Tab by mouth every 6 hours as needed.        Ibuprofen (Tab) MOTRIN 600 MG Take 1 Tab by mouth 3 times a day, with meals.        Omeprazole (CAPSULE DELAYED RELEASE) PRILOSEC 20 MG Take 1 Cap by mouth every day.        Ondansetron (TABLET DISPERSIBLE) ZOFRAN ODT 4 MG Take 1 Tab by mouth every 8 hours as needed for Nausea/Vomiting.        TraMADol HCl (Tab) ULTRAM 50 MG Take 1 Tab by mouth every 6 hours as needed for Moderate Pain.        .                 Medicines prescribed today were sent to:     [facility] #[id number] - [place], [place] - [address]    [address] [place] [address] [place] [address]    Phone: [phone] Fax: [phone]    Open 24 Hours?: No      Medication refill instructions:       If your prescription bottle indicates you have medication refills left, it is not necessary to call your provider’s office. Please contact your pharmacy and they will refill your medication.    If your prescription bottle indicates you do not have any refills left, you may request refills at any time through one of the following ways: The online Myrio Solution system (except Urgent Care), by calling your provider’s office, or by asking your pharmacy to contact your provider’s office with a refill request. Medication refills are processed only during regular business hours and may not be available until the next business day. Your provider may request additional information or to have a follow-up visit with you prior to refilling your medication.   *Please Note: Medication refills are assigned a new Rx number when refilled electronically. Your pharmacy may indicate that no refills were authorized even though a new prescription for the same medication is  available at the pharmacy. Please request the medicine by name with the pharmacy before contacting your provider for a refill.           Evolita Access Code: [email or web address]  Expires: 3/12/2017 12:41 PM    Evolita  A secure, online tool to manage your health information     [employer] Evolita® is a secure, online tool that connects you to your personalized health information from the privacy of your home -- day or night - making it very easy for you to manage your healthcare. Once the activation process is completed, you can even access your medical information using the Evolita radha, which is available for free in the Apple Radha store or Google Play store.     Evolita provides the following levels of access (as shown below):   My Chart Features   [facility] Doctor [facility]  Urgent  Care Non-[facility]  Primary Care  Doctor   Email your healthcare team securely and privately 24/7 X X X    Manage appointments: schedule your next appointment; view details of past/upcoming appointments X      Request prescription refills. X      View recent personal medical records, including lab and immunizations X X X X   View health record, including health history, allergies, medications X X X X   Read reports about your outpatient visits, procedures, consult and ER notes X X X X   See your discharge summary, which is a recap of your hospital and/or ER visit that includes your diagnosis, lab results, and care plan. X X       How to register for Evolita:  1. Go to  [email or web address].  2. Click on the Sign Up Now box, which takes you to the New Member Sign Up page. You will need to provide the following information:  a. Enter your Evolita Access Code exactly as it appears at the top of this page. (You will not need to use this code after you’ve completed the sign-up process. If you do not sign up before the expiration date, you must request a new code.)   b. Enter your date of birth.   c. Enter  your home email address.   d. Click Submit, and follow the next screen’s instructions.  3. Create a Coveot ID. This will be your Triloq login ID and cannot be changed, so think of one that is secure and easy to remember.  4. Create a Coveot password. You can change your password at any time.  5. Enter your Password Reset Question and Answer. This can be used at a later time if you forget your password.   6. Enter your e-mail address. This allows you to receive e-mail notifications when new information is available in Triloq.  7. Click Sign Up. You can now view your health information.    For assistance activating your Triloq account, call [phone]         details…

## 2019-06-19 ENCOUNTER — APPOINTMENT (OUTPATIENT)
Dept: RADIOLOGY | Facility: MEDICAL CENTER | Age: 49
DRG: 603 | End: 2019-06-19
Attending: EMERGENCY MEDICINE

## 2019-06-19 ENCOUNTER — HOSPITAL ENCOUNTER (INPATIENT)
Facility: MEDICAL CENTER | Age: 49
LOS: 1 days | DRG: 603 | End: 2019-06-20
Attending: EMERGENCY MEDICINE | Admitting: HOSPITALIST

## 2019-06-19 DIAGNOSIS — R05.9 COUGH: ICD-10-CM

## 2019-06-19 DIAGNOSIS — L03.114 CELLULITIS OF HAND, LEFT: ICD-10-CM

## 2019-06-19 DIAGNOSIS — J98.01 BRONCHOSPASM: ICD-10-CM

## 2019-06-19 PROBLEM — Z72.0 TOBACCO ABUSE: Status: ACTIVE | Noted: 2019-06-19

## 2019-06-19 PROBLEM — E87.1 HYPONATREMIA: Status: ACTIVE | Noted: 2019-06-19

## 2019-06-19 PROBLEM — S69.92XA HAND INJURY, LEFT, INITIAL ENCOUNTER: Status: ACTIVE | Noted: 2019-06-19

## 2019-06-19 LAB
ALBUMIN SERPL BCP-MCNC: 4.4 G/DL (ref 3.2–4.9)
ALBUMIN/GLOB SERPL: 1.1 G/DL
ALP SERPL-CCNC: 75 U/L (ref 30–99)
ALT SERPL-CCNC: 18 U/L (ref 2–50)
ANION GAP SERPL CALC-SCNC: 10 MMOL/L (ref 0–11.9)
AST SERPL-CCNC: 29 U/L (ref 12–45)
BASOPHILS # BLD AUTO: 0.3 % (ref 0–1.8)
BASOPHILS # BLD: 0.02 K/UL (ref 0–0.12)
BILIRUB SERPL-MCNC: 1.2 MG/DL (ref 0.1–1.5)
BUN SERPL-MCNC: 13 MG/DL (ref 8–22)
CALCIUM SERPL-MCNC: 8.8 MG/DL (ref 8.4–10.2)
CHLORIDE SERPL-SCNC: 100 MMOL/L (ref 96–112)
CO2 SERPL-SCNC: 24 MMOL/L (ref 20–33)
CREAT SERPL-MCNC: 0.79 MG/DL (ref 0.5–1.4)
EOSINOPHIL # BLD AUTO: 0.03 K/UL (ref 0–0.51)
EOSINOPHIL NFR BLD: 0.4 % (ref 0–6.9)
ERYTHROCYTE [DISTWIDTH] IN BLOOD BY AUTOMATED COUNT: 43.8 FL (ref 35.9–50)
GLOBULIN SER CALC-MCNC: 3.9 G/DL (ref 1.9–3.5)
GLUCOSE SERPL-MCNC: 95 MG/DL (ref 65–99)
HCT VFR BLD AUTO: 47.9 % (ref 37–47)
HGB BLD-MCNC: 15.7 G/DL (ref 12–16)
IMM GRANULOCYTES # BLD AUTO: 0.02 K/UL (ref 0–0.11)
IMM GRANULOCYTES NFR BLD AUTO: 0.3 % (ref 0–0.9)
LACTATE BLD-SCNC: 1.2 MMOL/L (ref 0.5–2)
LYMPHOCYTES # BLD AUTO: 1.6 K/UL (ref 1–4.8)
LYMPHOCYTES NFR BLD: 21 % (ref 22–41)
MCH RBC QN AUTO: 30.4 PG (ref 27–33)
MCHC RBC AUTO-ENTMCNC: 32.8 G/DL (ref 33.6–35)
MCV RBC AUTO: 92.6 FL (ref 81.4–97.8)
MONOCYTES # BLD AUTO: 0.63 K/UL (ref 0–0.85)
MONOCYTES NFR BLD AUTO: 8.3 % (ref 0–13.4)
NEUTROPHILS # BLD AUTO: 5.33 K/UL (ref 2–7.15)
NEUTROPHILS NFR BLD: 69.7 % (ref 44–72)
NRBC # BLD AUTO: 0 K/UL
NRBC BLD-RTO: 0 /100 WBC
PLATELET # BLD AUTO: 275 K/UL (ref 164–446)
PMV BLD AUTO: 9.2 FL (ref 9–12.9)
POTASSIUM SERPL-SCNC: 4.3 MMOL/L (ref 3.6–5.5)
PROT SERPL-MCNC: 8.3 G/DL (ref 6–8.2)
RBC # BLD AUTO: 5.17 M/UL (ref 4.2–5.4)
SODIUM SERPL-SCNC: 134 MMOL/L (ref 135–145)
WBC # BLD AUTO: 7.6 K/UL (ref 4.8–10.8)

## 2019-06-19 PROCEDURE — 87040 BLOOD CULTURE FOR BACTERIA: CPT

## 2019-06-19 PROCEDURE — 94760 N-INVAS EAR/PLS OXIMETRY 1: CPT

## 2019-06-19 PROCEDURE — 770006 HCHG ROOM/CARE - MED/SURG/GYN SEMI*

## 2019-06-19 PROCEDURE — 83605 ASSAY OF LACTIC ACID: CPT

## 2019-06-19 PROCEDURE — 700105 HCHG RX REV CODE 258: Performed by: HOSPITALIST

## 2019-06-19 PROCEDURE — 71045 X-RAY EXAM CHEST 1 VIEW: CPT

## 2019-06-19 PROCEDURE — 99407 BEHAV CHNG SMOKING > 10 MIN: CPT | Performed by: HOSPITALIST

## 2019-06-19 PROCEDURE — 700101 HCHG RX REV CODE 250: Performed by: EMERGENCY MEDICINE

## 2019-06-19 PROCEDURE — 700111 HCHG RX REV CODE 636 W/ 250 OVERRIDE (IP): Performed by: HOSPITALIST

## 2019-06-19 PROCEDURE — A9270 NON-COVERED ITEM OR SERVICE: HCPCS | Performed by: HOSPITALIST

## 2019-06-19 PROCEDURE — 96365 THER/PROPH/DIAG IV INF INIT: CPT

## 2019-06-19 PROCEDURE — 99285 EMERGENCY DEPT VISIT HI MDM: CPT

## 2019-06-19 PROCEDURE — 700111 HCHG RX REV CODE 636 W/ 250 OVERRIDE (IP): Performed by: EMERGENCY MEDICINE

## 2019-06-19 PROCEDURE — 700105 HCHG RX REV CODE 258: Performed by: EMERGENCY MEDICINE

## 2019-06-19 PROCEDURE — 85025 COMPLETE CBC W/AUTO DIFF WBC: CPT

## 2019-06-19 PROCEDURE — 73130 X-RAY EXAM OF HAND: CPT | Mod: LT

## 2019-06-19 PROCEDURE — 94640 AIRWAY INHALATION TREATMENT: CPT

## 2019-06-19 PROCEDURE — 80053 COMPREHEN METABOLIC PANEL: CPT

## 2019-06-19 PROCEDURE — 99223 1ST HOSP IP/OBS HIGH 75: CPT | Mod: 25 | Performed by: HOSPITALIST

## 2019-06-19 PROCEDURE — 700102 HCHG RX REV CODE 250 W/ 637 OVERRIDE(OP): Performed by: HOSPITALIST

## 2019-06-19 RX ORDER — METHYLPREDNISOLONE SODIUM SUCCINATE 40 MG/ML
40 INJECTION, POWDER, LYOPHILIZED, FOR SOLUTION INTRAMUSCULAR; INTRAVENOUS EVERY 6 HOURS
Status: DISCONTINUED | OUTPATIENT
Start: 2019-06-19 | End: 2019-06-20

## 2019-06-19 RX ORDER — IPRATROPIUM BROMIDE AND ALBUTEROL SULFATE 2.5; .5 MG/3ML; MG/3ML
3 SOLUTION RESPIRATORY (INHALATION)
Status: DISCONTINUED | OUTPATIENT
Start: 2019-06-19 | End: 2019-06-20 | Stop reason: HOSPADM

## 2019-06-19 RX ORDER — ACETAMINOPHEN 325 MG/1
650 TABLET ORAL EVERY 6 HOURS PRN
Status: DISCONTINUED | OUTPATIENT
Start: 2019-06-19 | End: 2019-06-20 | Stop reason: HOSPADM

## 2019-06-19 RX ORDER — OMEPRAZOLE 20 MG/1
20 CAPSULE, DELAYED RELEASE ORAL DAILY
Status: DISCONTINUED | OUTPATIENT
Start: 2019-06-19 | End: 2019-06-20 | Stop reason: HOSPADM

## 2019-06-19 RX ORDER — PREDNISONE 20 MG/1
60 TABLET ORAL ONCE
Status: COMPLETED | OUTPATIENT
Start: 2019-06-19 | End: 2019-06-19

## 2019-06-19 RX ORDER — OXYCODONE HYDROCHLORIDE 5 MG/1
2.5 TABLET ORAL
Status: DISCONTINUED | OUTPATIENT
Start: 2019-06-19 | End: 2019-06-20 | Stop reason: HOSPADM

## 2019-06-19 RX ORDER — SODIUM CHLORIDE 9 MG/ML
INJECTION, SOLUTION INTRAVENOUS CONTINUOUS
Status: DISCONTINUED | OUTPATIENT
Start: 2019-06-19 | End: 2019-06-19

## 2019-06-19 RX ORDER — POLYETHYLENE GLYCOL 3350 17 G/17G
1 POWDER, FOR SOLUTION ORAL
Status: DISCONTINUED | OUTPATIENT
Start: 2019-06-19 | End: 2019-06-20 | Stop reason: HOSPADM

## 2019-06-19 RX ORDER — SODIUM CHLORIDE 9 MG/ML
INJECTION, SOLUTION INTRAVENOUS CONTINUOUS
Status: DISCONTINUED | OUTPATIENT
Start: 2019-06-19 | End: 2019-06-20 | Stop reason: HOSPADM

## 2019-06-19 RX ORDER — BISACODYL 10 MG
10 SUPPOSITORY, RECTAL RECTAL
Status: DISCONTINUED | OUTPATIENT
Start: 2019-06-19 | End: 2019-06-20 | Stop reason: HOSPADM

## 2019-06-19 RX ORDER — AMOXICILLIN 250 MG
2 CAPSULE ORAL 2 TIMES DAILY
Status: DISCONTINUED | OUTPATIENT
Start: 2019-06-19 | End: 2019-06-20 | Stop reason: HOSPADM

## 2019-06-19 RX ORDER — SODIUM CHLORIDE 9 MG/ML
30 INJECTION, SOLUTION INTRAVENOUS ONCE
Status: COMPLETED | OUTPATIENT
Start: 2019-06-19 | End: 2019-06-19

## 2019-06-19 RX ORDER — OXYCODONE HYDROCHLORIDE 5 MG/1
5 TABLET ORAL
Status: DISCONTINUED | OUTPATIENT
Start: 2019-06-19 | End: 2019-06-20 | Stop reason: HOSPADM

## 2019-06-19 RX ORDER — HYDROMORPHONE HYDROCHLORIDE 1 MG/ML
0.25 INJECTION, SOLUTION INTRAMUSCULAR; INTRAVENOUS; SUBCUTANEOUS
Status: DISCONTINUED | OUTPATIENT
Start: 2019-06-19 | End: 2019-06-20 | Stop reason: HOSPADM

## 2019-06-19 RX ADMIN — VANCOMYCIN 1600 MG: 1 INJECTION, SOLUTION INTRAVENOUS at 15:29

## 2019-06-19 RX ADMIN — METHYLPREDNISOLONE SODIUM SUCCINATE 40 MG: 40 INJECTION, POWDER, FOR SOLUTION INTRAMUSCULAR; INTRAVENOUS at 17:59

## 2019-06-19 RX ADMIN — SODIUM CHLORIDE: 9 INJECTION, SOLUTION INTRAVENOUS at 18:06

## 2019-06-19 RX ADMIN — SENNOSIDES, DOCUSATE SODIUM 2 TABLET: 50; 8.6 TABLET, FILM COATED ORAL at 18:02

## 2019-06-19 RX ADMIN — ALBUTEROL SULFATE 2.5 MG: 2.5 SOLUTION RESPIRATORY (INHALATION) at 14:09

## 2019-06-19 RX ADMIN — OMEPRAZOLE 20 MG: 20 CAPSULE, DELAYED RELEASE ORAL at 18:02

## 2019-06-19 RX ADMIN — SODIUM CHLORIDE 1914 ML: 9 INJECTION, SOLUTION INTRAVENOUS at 13:40

## 2019-06-19 RX ADMIN — CEFTRIAXONE 1 G: 1 INJECTION, POWDER, FOR SOLUTION INTRAMUSCULAR; INTRAVENOUS at 13:39

## 2019-06-19 RX ADMIN — OXYCODONE HYDROCHLORIDE 5 MG: 5 TABLET ORAL at 17:58

## 2019-06-19 RX ADMIN — PREDNISONE 60 MG: 20 TABLET ORAL at 13:39

## 2019-06-19 RX ADMIN — AMPICILLIN SODIUM AND SULBACTAM SODIUM 3 G: 2; 1 INJECTION, POWDER, FOR SOLUTION INTRAMUSCULAR; INTRAVENOUS at 18:30

## 2019-06-19 ASSESSMENT — ENCOUNTER SYMPTOMS
SENSORY CHANGE: 0
NERVOUS/ANXIOUS: 0
DIZZINESS: 0
MYALGIAS: 1
TINGLING: 0
CLAUDICATION: 0
SPUTUM PRODUCTION: 0
NECK PAIN: 0
DEPRESSION: 0
HEADACHES: 0
NAUSEA: 0
BACK PAIN: 0
BLURRED VISION: 0
CONSTIPATION: 0
ORTHOPNEA: 0
COUGH: 0
SORE THROAT: 0
TREMORS: 0
HEMOPTYSIS: 0
PND: 0
HEARTBURN: 0
PHOTOPHOBIA: 0
SPEECH CHANGE: 0
DOUBLE VISION: 0
WEAKNESS: 0
WHEEZING: 1
MEMORY LOSS: 0
FEVER: 0
CHILLS: 0
SHORTNESS OF BREATH: 1
BLOOD IN STOOL: 0
EYE PAIN: 0
PALPITATIONS: 0
VOMITING: 0
STRIDOR: 0

## 2019-06-19 ASSESSMENT — COGNITIVE AND FUNCTIONAL STATUS - GENERAL
TOILETING: A LITTLE
DRESSING REGULAR UPPER BODY CLOTHING: A LITTLE
HELP NEEDED FOR BATHING: A LITTLE
DRESSING REGULAR LOWER BODY CLOTHING: A LITTLE
MOBILITY SCORE: 21
SUGGESTED CMS G CODE MODIFIER DAILY ACTIVITY: CK
WALKING IN HOSPITAL ROOM: A LITTLE
EATING MEALS: A LITTLE
CLIMB 3 TO 5 STEPS WITH RAILING: A LITTLE
DAILY ACTIVITIY SCORE: 18
SUGGESTED CMS G CODE MODIFIER MOBILITY: CJ
PERSONAL GROOMING: A LITTLE
STANDING UP FROM CHAIR USING ARMS: A LITTLE

## 2019-06-19 ASSESSMENT — PATIENT HEALTH QUESTIONNAIRE - PHQ9
1. LITTLE INTEREST OR PLEASURE IN DOING THINGS: NOT AT ALL
SUM OF ALL RESPONSES TO PHQ9 QUESTIONS 1 AND 2: 0
2. FEELING DOWN, DEPRESSED, IRRITABLE, OR HOPELESS: NOT AT ALL

## 2019-06-19 ASSESSMENT — LIFESTYLE VARIABLES
ALCOHOL_USE: YES
EVER HAD A DRINK FIRST THING IN THE MORNING TO STEADY YOUR NERVES TO GET RID OF A HANGOVER: NO
HOW MANY TIMES IN THE PAST YEAR HAVE YOU HAD 5 OR MORE DRINKS IN A DAY: 0
AVERAGE NUMBER OF DAYS PER WEEK YOU HAVE A DRINK CONTAINING ALCOHOL: 1
EVER FELT BAD OR GUILTY ABOUT YOUR DRINKING: NO
HAVE PEOPLE ANNOYED YOU BY CRITICIZING YOUR DRINKING: NO
HAVE YOU EVER FELT YOU SHOULD CUT DOWN ON YOUR DRINKING: NO
TOTAL SCORE: 0
CONSUMPTION TOTAL: NEGATIVE
ON A TYPICAL DAY WHEN YOU DRINK ALCOHOL HOW MANY DRINKS DO YOU HAVE: 1
TOTAL SCORE: 0
EVER_SMOKED: YES
TOTAL SCORE: 0

## 2019-06-19 ASSESSMENT — COPD QUESTIONNAIRES
COPD SCREENING SCORE: 4
HAVE YOU SMOKED AT LEAST 100 CIGARETTES IN YOUR ENTIRE LIFE: YES
DO YOU EVER COUGH UP ANY MUCUS OR PHLEGM?: NO/ONLY WITH OCCASIONAL COLDS OR INFECTIONS
DURING THE PAST 4 WEEKS HOW MUCH DID YOU FEEL SHORT OF BREATH: SOME OF THE TIME

## 2019-06-19 NOTE — PROGRESS NOTES
2 RN skin assessment complete, skin not WDL.Pt has swelling/redness/bruising on L index finger, painful to touch, no other signs of skin breakdown.
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
## 2019-06-19 NOTE — PROGRESS NOTES
"Pharmacy Kinetics 49 y.o. female on vancomycin day # 1 2019    Receiving Vancomycin 1600 mg loading dose iv currently. Hung at 1529 19    Indication for Treatment: skin and soft tissue infection    Pertinent history per medical record: Admitted on 2019 for abscess of left index finger.    Other antibiotics: Unasyn 3gm iv q6 hours    Allergies: Patient has no known allergies.     List concerns for renal function: other nephrotoxic drugs (unasyn)    Pertinent cultures to date:   None        Recent Labs      19   1330   WBC  7.6   NEUTSPOLYS  69.70     Recent Labs      19   1330   BUN  13   CREATININE  0.79   ALBUMIN  4.4       No intake or output data in the 24 hours ending 19 1603   /69   Pulse (!) 103   Temp 37.3 °C (99.2 °F) (Oral)   Resp 20   Ht 1.626 m (5' 4\")   Wt 63.8 kg (140 lb 10.5 oz)   SpO2 97%  Temp (24hrs), Av °C (98.6 °F), Min:36.7 °C (98 °F), Max:37.3 °C (99.2 °F)      A/P   1. Maintenance Vancomycin dose 1 gm every 12 hours to begin 0300 19  2. Vancomycin level due at 1430 19  3. Goal trough: 12-16 mcg/ml    GIANNA QUIÑONEZ  "

## 2019-06-19 NOTE — CARE PLAN
Problem: Safety  Goal: Will remain free from injury    Intervention: Provide assistance with mobility  Bed in low position, traded socks on, call light within reach. Pt instructed to call nurse for any further needs. Bed alarm in place        Problem: Knowledge Deficit  Goal: Knowledge of disease process/condition, treatment plan, diagnostic tests, and medications will improve    Intervention: Assess knowledge level of disease process/condition, treatment plan, diagnostic tests, and medications  POC discussed with pt, pt verbalized understanding.  IV abx, IV fluids ordered

## 2019-06-19 NOTE — PROGRESS NOTES
Received report from ED RN, assumed pt care. Pt transferred from ED to [facility] room 212-1. Pt A&Ox4, reports mild pain in hand. Pt taught to inform nurse if experiencing pain above comfort goal, SOB, chest pain, ambulating out of bed, or for any further assistance. Fall precautions in place, call light within reach. Will continue with care. Pt has 500cc boluses x2 left, will run before continuous fluids, Vancomycin started

## 2019-06-19 NOTE — ASSESSMENT & PLAN NOTE
Change steroid to inhaled  Add as needed albuterol nebulizer  Continue RT protocol, duo nebs, Pep therapy if warranted, and incentive spirometry.

## 2019-06-19 NOTE — ED NOTES
Patient moved into room 10.  Line and lab obtained. Patient very anxious about PIV.  Reassured.   Oriented to call button and room.  Will continue to monitor.
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
## 2019-06-20 VITALS
SYSTOLIC BLOOD PRESSURE: 135 MMHG | DIASTOLIC BLOOD PRESSURE: 69 MMHG | HEART RATE: 90 BPM | OXYGEN SATURATION: 94 % | HEIGHT: 64 IN | BODY MASS INDEX: 24.01 KG/M2 | TEMPERATURE: 98.1 F | RESPIRATION RATE: 20 BRPM | WEIGHT: 140.65 LBS

## 2019-06-20 LAB
ALBUMIN SERPL BCP-MCNC: 2.9 G/DL (ref 3.2–4.9)
ALBUMIN/GLOB SERPL: 0.9 G/DL
ALP SERPL-CCNC: 54 U/L (ref 30–99)
ALT SERPL-CCNC: 14 U/L (ref 2–50)
ANION GAP SERPL CALC-SCNC: 9 MMOL/L (ref 0–11.9)
AST SERPL-CCNC: 13 U/L (ref 12–45)
BASOPHILS # BLD AUTO: 0.1 % (ref 0–1.8)
BASOPHILS # BLD: 0.01 K/UL (ref 0–0.12)
BILIRUB SERPL-MCNC: 0.5 MG/DL (ref 0.1–1.5)
BUN SERPL-MCNC: 11 MG/DL (ref 8–22)
CALCIUM SERPL-MCNC: 8.1 MG/DL (ref 8.4–10.2)
CHLORIDE SERPL-SCNC: 107 MMOL/L (ref 96–112)
CO2 SERPL-SCNC: 23 MMOL/L (ref 20–33)
CREAT SERPL-MCNC: 0.62 MG/DL (ref 0.5–1.4)
EOSINOPHIL # BLD AUTO: 0 K/UL (ref 0–0.51)
EOSINOPHIL NFR BLD: 0 % (ref 0–6.9)
ERYTHROCYTE [DISTWIDTH] IN BLOOD BY AUTOMATED COUNT: 44.1 FL (ref 35.9–50)
GLOBULIN SER CALC-MCNC: 3.1 G/DL (ref 1.9–3.5)
GLUCOSE SERPL-MCNC: 187 MG/DL (ref 65–99)
HCT VFR BLD AUTO: 38.8 % (ref 37–47)
HGB BLD-MCNC: 12.8 G/DL (ref 12–16)
IMM GRANULOCYTES # BLD AUTO: 0.05 K/UL (ref 0–0.11)
IMM GRANULOCYTES NFR BLD AUTO: 0.6 % (ref 0–0.9)
LYMPHOCYTES # BLD AUTO: 0.98 K/UL (ref 1–4.8)
LYMPHOCYTES NFR BLD: 11.3 % (ref 22–41)
MCH RBC QN AUTO: 30.8 PG (ref 27–33)
MCHC RBC AUTO-ENTMCNC: 33 G/DL (ref 33.6–35)
MCV RBC AUTO: 93.3 FL (ref 81.4–97.8)
MONOCYTES # BLD AUTO: 0.13 K/UL (ref 0–0.85)
MONOCYTES NFR BLD AUTO: 1.5 % (ref 0–13.4)
NEUTROPHILS # BLD AUTO: 7.52 K/UL (ref 2–7.15)
NEUTROPHILS NFR BLD: 86.5 % (ref 44–72)
NRBC # BLD AUTO: 0 K/UL
NRBC BLD-RTO: 0 /100 WBC
PLATELET # BLD AUTO: 237 K/UL (ref 164–446)
PMV BLD AUTO: 9.4 FL (ref 9–12.9)
POTASSIUM SERPL-SCNC: 4.1 MMOL/L (ref 3.6–5.5)
PROT SERPL-MCNC: 6 G/DL (ref 6–8.2)
RBC # BLD AUTO: 4.16 M/UL (ref 4.2–5.4)
SODIUM SERPL-SCNC: 139 MMOL/L (ref 135–145)
VANCOMYCIN TROUGH SERPL-MCNC: 7.1 UG/ML (ref 10–20)
WBC # BLD AUTO: 8.7 K/UL (ref 4.8–10.8)

## 2019-06-20 PROCEDURE — 85025 COMPLETE CBC W/AUTO DIFF WBC: CPT

## 2019-06-20 PROCEDURE — 700102 HCHG RX REV CODE 250 W/ 637 OVERRIDE(OP): Performed by: HOSPITALIST

## 2019-06-20 PROCEDURE — 700105 HCHG RX REV CODE 258: Performed by: HOSPITALIST

## 2019-06-20 PROCEDURE — 700102 HCHG RX REV CODE 250 W/ 637 OVERRIDE(OP): Performed by: INTERNAL MEDICINE

## 2019-06-20 PROCEDURE — 80202 ASSAY OF VANCOMYCIN: CPT

## 2019-06-20 PROCEDURE — 99239 HOSP IP/OBS DSCHRG MGMT >30: CPT | Performed by: INTERNAL MEDICINE

## 2019-06-20 PROCEDURE — 700111 HCHG RX REV CODE 636 W/ 250 OVERRIDE (IP): Performed by: INTERNAL MEDICINE

## 2019-06-20 PROCEDURE — 80053 COMPREHEN METABOLIC PANEL: CPT

## 2019-06-20 PROCEDURE — 96365 THER/PROPH/DIAG IV INF INIT: CPT

## 2019-06-20 PROCEDURE — 36415 COLL VENOUS BLD VENIPUNCTURE: CPT

## 2019-06-20 PROCEDURE — 99406 BEHAV CHNG SMOKING 3-10 MIN: CPT

## 2019-06-20 PROCEDURE — A9270 NON-COVERED ITEM OR SERVICE: HCPCS | Performed by: INTERNAL MEDICINE

## 2019-06-20 PROCEDURE — 700111 HCHG RX REV CODE 636 W/ 250 OVERRIDE (IP): Performed by: HOSPITALIST

## 2019-06-20 PROCEDURE — 99285 EMERGENCY DEPT VISIT HI MDM: CPT

## 2019-06-20 PROCEDURE — A9270 NON-COVERED ITEM OR SERVICE: HCPCS | Performed by: HOSPITALIST

## 2019-06-20 RX ORDER — AMOXICILLIN AND CLAVULANATE POTASSIUM 875; 125 MG/1; MG/1
1 TABLET, FILM COATED ORAL 2 TIMES DAILY
Qty: 28 TAB | Refills: 0 | Status: SHIPPED | OUTPATIENT
Start: 2019-06-20 | End: 2019-07-04

## 2019-06-20 RX ORDER — SULFAMETHOXAZOLE AND TRIMETHOPRIM 800; 160 MG/1; MG/1
1 TABLET ORAL 2 TIMES DAILY
Qty: 28 TAB | Refills: 0 | Status: SHIPPED | OUTPATIENT
Start: 2019-06-20 | End: 2019-07-04

## 2019-06-20 RX ORDER — PREDNISONE 20 MG/1
40 TABLET ORAL DAILY
Status: DISCONTINUED | OUTPATIENT
Start: 2019-06-20 | End: 2019-06-20

## 2019-06-20 RX ORDER — BUDESONIDE AND FORMOTEROL FUMARATE DIHYDRATE 160; 4.5 UG/1; UG/1
2 AEROSOL RESPIRATORY (INHALATION) 2 TIMES DAILY
Status: DISCONTINUED | OUTPATIENT
Start: 2019-06-21 | End: 2019-06-20 | Stop reason: HOSPADM

## 2019-06-20 RX ORDER — BUDESONIDE AND FORMOTEROL FUMARATE DIHYDRATE 160; 4.5 UG/1; UG/1
2 AEROSOL RESPIRATORY (INHALATION)
Status: DISCONTINUED | OUTPATIENT
Start: 2019-06-20 | End: 2019-06-20

## 2019-06-20 RX ADMIN — PREDNISONE 40 MG: 20 TABLET ORAL at 10:48

## 2019-06-20 RX ADMIN — VANCOMYCIN HYDROCHLORIDE 1000 MG: 500 INJECTION, POWDER, LYOPHILIZED, FOR SOLUTION INTRAVENOUS at 15:16

## 2019-06-20 RX ADMIN — AMPICILLIN SODIUM AND SULBACTAM SODIUM 3 G: 2; 1 INJECTION, POWDER, FOR SOLUTION INTRAMUSCULAR; INTRAVENOUS at 06:31

## 2019-06-20 RX ADMIN — OXYCODONE HYDROCHLORIDE 2.5 MG: 5 TABLET ORAL at 06:28

## 2019-06-20 RX ADMIN — BUDESONIDE AND FORMOTEROL FUMARATE DIHYDRATE 2 PUFF: 160; 4.5 AEROSOL RESPIRATORY (INHALATION) at 15:23

## 2019-06-20 RX ADMIN — METHYLPREDNISOLONE SODIUM SUCCINATE 40 MG: 40 INJECTION, POWDER, FOR SOLUTION INTRAMUSCULAR; INTRAVENOUS at 05:30

## 2019-06-20 RX ADMIN — VANCOMYCIN HYDROCHLORIDE 1000 MG: 500 INJECTION, POWDER, LYOPHILIZED, FOR SOLUTION INTRAVENOUS at 04:07

## 2019-06-20 RX ADMIN — METHYLPREDNISOLONE SODIUM SUCCINATE 40 MG: 40 INJECTION, POWDER, FOR SOLUTION INTRAMUSCULAR; INTRAVENOUS at 00:05

## 2019-06-20 RX ADMIN — AMPICILLIN SODIUM AND SULBACTAM SODIUM 3 G: 2; 1 INJECTION, POWDER, FOR SOLUTION INTRAMUSCULAR; INTRAVENOUS at 00:07

## 2019-06-20 RX ADMIN — AMPICILLIN SODIUM AND SULBACTAM SODIUM 3 G: 2; 1 INJECTION, POWDER, FOR SOLUTION INTRAMUSCULAR; INTRAVENOUS at 11:39

## 2019-06-20 RX ADMIN — OMEPRAZOLE 20 MG: 20 CAPSULE, DELAYED RELEASE ORAL at 05:30

## 2019-06-20 RX ADMIN — AMPICILLIN SODIUM AND SULBACTAM SODIUM 3 G: 2; 1 INJECTION, POWDER, FOR SOLUTION INTRAMUSCULAR; INTRAVENOUS at 17:24

## 2019-06-20 RX ADMIN — SENNOSIDES, DOCUSATE SODIUM 2 TABLET: 50; 8.6 TABLET, FILM COATED ORAL at 05:29

## 2019-06-20 ASSESSMENT — ENCOUNTER SYMPTOMS
MYALGIAS: 0
PALPITATIONS: 0
VOMITING: 0
DIARRHEA: 0
BLOOD IN STOOL: 0
DEPRESSION: 1
SHORTNESS OF BREATH: 0
NAUSEA: 0
SORE THROAT: 0
DIZZINESS: 0
HEADACHES: 0
COUGH: 0
WEAKNESS: 0
FEVER: 0
ABDOMINAL PAIN: 0
HALLUCINATIONS: 0
CHILLS: 0
FOCAL WEAKNESS: 0
BACK PAIN: 0
WHEEZING: 1
HEARTBURN: 0

## 2019-06-20 NOTE — PROGRESS NOTES
L hand red and edematous, index finger is crusted-  active drainage with pus with increased mobility. Pt refused pain medication/ ice packs. Vital signs stable, antibiotics and IV fluids infusing. Will continue to monitor.
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
## 2019-06-20 NOTE — CARE PLAN
"Problem: Safety  Goal: Will remain free from injury  Outcome: PROGRESSING AS EXPECTED  Pt calls appropriately when in need. Call light and personal belongings w/n reach. Room free of clutters. Bed locked and in lowest position. Answer call light immediately. Hourly rounding.    Problem: Infection  Goal: Will remain free from infection  Outcome: PROGRESSING AS EXPECTED  Pt afebrile so far. IV antibiotic administered per MD order. Scant purulent drainage observed and cleaned in left index finger. Finger still swollen, redness in finger and parts pf palm observed, but pt stated \"It's gotten better.\" Will continue to monitor.       "

## 2019-06-20 NOTE — PROGRESS NOTES
Report given to SANDY De Souza. Pt's left index finger was draining purulent drainage last night, was cleaned w/ gauze w/ NS. Wound open to air.

## 2019-06-20 NOTE — PROGRESS NOTES
"1915 Report received from SANDY Young. POC discussed.     Pt AAOx4. C/o generalized weakness and pain 5/10 \"achy\" backaches and painful index finger. \"My legs are on fire\". Reports numbness in fingertips that started this morning. Index finger red and swollen pt stated \" It's gotten better, swelling gotten down\", \"but I don't know , I just don't feel right.\" Denies any chills, tingling. Safety  measures in place. No additional needs at this time.   "
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
## 2019-06-21 NOTE — DISCHARGE INSTRUCTIONS
Discharge Against Medical Advice  I am signing this paper to show that I am leaving this hospital or health care center of my own free will. It is done against all medical advice. In doing so, I am releasing this hospital or health care center and the attending physicians from any and all claims that I may want to make.  I understand that further care has been recommended. My condition may worsen. This could cause me further bodily injury, illness, or even death. I do know that the medical staff has fully explained to me the risk that I am taking in leaving against medical advice.  Document Released: 12/18/2006 Document Revised: 03/11/2013 Document Reviewed: 06/03/2008  [employer]® Patient Information ©2014 [employer].      Cellulitis, Adult  Cellulitis is a skin infection. The infected area is usually red and tender. This condition occurs most often in the arms and lower legs. The infection can travel to the muscles, blood, and underlying tissue and become serious. It is very important to get treated for this condition.  What are the causes?  Cellulitis is caused by bacteria. The bacteria enter through a break in the skin, such as a cut, burn, insect bite, open sore, or crack.  What increases the risk?  This condition is more likely to occur in people who:  · Have a weak defense system (immune system).  · Have open wounds on the skin such as cuts, burns, bites, and scrapes. Bacteria can enter the body through these open wounds.  · Are older.  · Have diabetes.  · Have a type of long-lasting (chronic) liver disease (cirrhosis) or kidney disease.  · Use IV drugs.  What are the signs or symptoms?  Symptoms of this condition include:  · Redness, streaking, or spotting on the skin.  · Swollen area of the skin.  · Tenderness or pain when an area of the skin is touched.  · Warm skin.  · Fever.  · Chills.  · Blisters.  How is this diagnosed?  This condition is diagnosed based on a medical history and physical exam. You may  also have tests, including:  · Blood tests.  · Lab tests.  · Imaging tests.  How is this treated?  Treatment for this condition may include:  · Medicines, such as antibiotic medicines or antihistamines.  · Supportive care, such as rest and application of cold or warm cloths (cold or warm compresses) to the skin.  · Hospital care, if the condition is severe.  The infection usually gets better within 1-2 days of treatment.  Follow these instructions at home:  · Take over-the-counter and prescription medicines only as told by your health care provider.  · If you were prescribed an antibiotic medicine, take it as told by your health care provider. Do not stop taking the antibiotic even if you start to feel better.  · Drink enough fluid to keep your urine clear or pale yellow.  · Do not touch or rub the infected area.  · Raise (elevate) the infected area above the level of your heart while you are sitting or lying down.  · Apply warm or cold compresses to the affected area as told by your health care provider.  · Keep all follow-up visits as told by your health care provider. This is important. These visits let your health care provider make sure a more serious infection is not developing.  Contact a health care provider if:  · You have a fever.  · Your symptoms do not improve within 1-2 days of starting treatment.  · Your bone or joint underneath the infected area becomes painful after the skin has healed.  · Your infection returns in the same area or another area.  · You notice a swollen bump in the infected area.  · You develop new symptoms.  · You have a general ill feeling (malaise) with muscle aches and pains.  Get help right away if:  · Your symptoms get worse.  · You feel very sleepy.  · You develop vomiting or diarrhea that persists.  · You notice red streaks coming from the infected area.  · Your red area gets larger or turns dark in color.  This information is not intended to replace advice given to you by  your health care provider. Make sure you discuss any questions you have with your health care provider.  Document Released: 09/27/2006 Document Revised: 04/27/2017 Document Reviewed: 10/26/2016  [employer] Interactive Patient Education © 2017 [employer]

## 2019-06-21 NOTE — PROGRESS NOTES
Patient is leaving AGAINST MEDICAL ADVICE, I had a face-to-face discussion about the severity of her condition, I told her that if she does not get properly treated her infection may worsen and then she may lose a finger as a result of this infection.  Patient said that she understands the risk but has to leave otherwise she gets evicted from her apartment.    I have prescribed her with oral Augmentin and Bactrim, although this is suboptimal this is the best we can do and the patient understands this.  I did recommend her to come back to the emergency room if her symptoms worsen.  I did also recommend her to follow-up with any physician in the next 1 to 2 days.

## 2019-06-21 NOTE — PROGRESS NOTES
"Charge RN Note    I was called to bedside by primary RN Nikunj. Patient anxious and hysterical, states she needs to get home to take care of her dogs and because she is going to be evicted in 2 days. Emotional support provided and discussed alternative options such as assistance for the patient's pets and living situation. Pt refuses and continues to want to leave against medical advice. Risks of leaving against medical advice explained to the patient including worsening infection, sepsis, loss of limb, death. The patient has the capacity to understand the risks and benefits described.    [facility] nursing coordinator made aware of situation and Dr. Ramirez notified. Dr. Ramirez to bedside to encourage the patient to continue treatment and further explain risks of leaving against medical advice. Pt continues to express desire to leave AMA. Dr. Ramirez offered to write the patient an antibiotic prescription, which patient initially agreed to but then said \"I can't wait anymore, I have to go now!\" and proceeded to leave the hospital without prescription. The patient was given discharge instructions and wound care instructions and encouraged to return to the emergency department at any time if she changes her mind and would like to continue treatment.    "
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
## 2019-06-24 LAB
BACTERIA BLD CULT: NORMAL
BACTERIA BLD CULT: NORMAL
SIGNIFICANT IND 70042: NORMAL
SIGNIFICANT IND 70042: NORMAL
SITE SITE: NORMAL
SITE SITE: NORMAL
SOURCE SOURCE: NORMAL
SOURCE SOURCE: NORMAL

## 2019-08-30 ENCOUNTER — HOSPITAL ENCOUNTER (EMERGENCY)
Facility: MEDICAL CENTER | Age: 49
End: 2019-08-30
Attending: EMERGENCY MEDICINE

## 2019-08-30 VITALS
RESPIRATION RATE: 18 BRPM | OXYGEN SATURATION: 97 % | WEIGHT: 135.8 LBS | HEART RATE: 89 BPM | BODY MASS INDEX: 23.18 KG/M2 | HEIGHT: 64 IN | SYSTOLIC BLOOD PRESSURE: 117 MMHG | DIASTOLIC BLOOD PRESSURE: 84 MMHG | TEMPERATURE: 98.7 F

## 2019-08-30 DIAGNOSIS — S21.012A LACERATION OF LEFT BREAST, INITIAL ENCOUNTER: ICD-10-CM

## 2019-08-30 DIAGNOSIS — S29.9XXA: ICD-10-CM

## 2019-08-30 PROCEDURE — 700102 HCHG RX REV CODE 250 W/ 637 OVERRIDE(OP): Performed by: EMERGENCY MEDICINE

## 2019-08-30 PROCEDURE — 99284 EMERGENCY DEPT VISIT MOD MDM: CPT

## 2019-08-30 PROCEDURE — A9270 NON-COVERED ITEM OR SERVICE: HCPCS | Performed by: EMERGENCY MEDICINE

## 2019-08-30 RX ORDER — HYDROCODONE BITARTRATE AND ACETAMINOPHEN 5; 325 MG/1; MG/1
1 TABLET ORAL ONCE
Status: COMPLETED | OUTPATIENT
Start: 2019-08-30 | End: 2019-08-30

## 2019-08-30 RX ORDER — CEPHALEXIN 500 MG/1
500 CAPSULE ORAL 2 TIMES DAILY
Qty: 14 CAP | Refills: 0 | Status: SHIPPED | OUTPATIENT
Start: 2019-08-30 | End: 2019-09-06

## 2019-08-30 RX ADMIN — HYDROCODONE BITARTRATE AND ACETAMINOPHEN 1 TABLET: 5; 325 TABLET ORAL at 17:32
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
## 2019-08-31 NOTE — ED TRIAGE NOTES
"Chief Complaint   Patient presents with   • T-5000 Lacerations     pt reports that she pulled out left nipple ring     Pt reports that she got stuck on a fence.  Bleeding controlled.  /88   Pulse (!) 118   Temp 36.1 °C (97 °F) (Temporal)   Resp 20   Ht 1.626 m (5' 4\")   Wt 61.6 kg (135 lb 12.9 oz)   SpO2 96%   Pt informed of wait times. Educated on triage process.  Asked to return to triage RN for any new or worsening of symptoms. Thanked for patience.        "

## 2019-08-31 NOTE — DISCHARGE INSTRUCTIONS
Please follow-up with your primary care physician in 2-3 days for wound recheck.  You may take Tylenol or ibuprofen as needed for pain.  After 24 hours, you may wash the wound normally.  Please follow-up with your primary care physician, urgent care, or this emergency department in 7-10 days  wound recheck.  Return to the emergency department immediately if you develop redness or swelling around the wound, pain in the area of the wound, streaking from the wound, drainage from the wound, fevers, or if you develop any other new or worsening symptoms.  Please take the antibiotics as prescribed.

## 2020-04-14 ENCOUNTER — APPOINTMENT (OUTPATIENT)
Dept: RADIOLOGY | Facility: MEDICAL CENTER | Age: 50
End: 2020-04-14
Attending: EMERGENCY MEDICINE
Payer: MEDICAID

## 2020-04-14 ENCOUNTER — HOSPITAL ENCOUNTER (EMERGENCY)
Facility: MEDICAL CENTER | Age: 50
End: 2020-04-15
Attending: EMERGENCY MEDICINE
Payer: MEDICAID

## 2020-04-14 VITALS
HEART RATE: 80 BPM | TEMPERATURE: 98 F | HEIGHT: 64 IN | OXYGEN SATURATION: 99 % | WEIGHT: 153.66 LBS | SYSTOLIC BLOOD PRESSURE: 140 MMHG | DIASTOLIC BLOOD PRESSURE: 83 MMHG | RESPIRATION RATE: 18 BRPM | BODY MASS INDEX: 26.23 KG/M2

## 2020-04-14 DIAGNOSIS — S82.301A CLOSED FRACTURE OF DISTAL END OF RIGHT TIBIA, UNSPECIFIED FRACTURE MORPHOLOGY, INITIAL ENCOUNTER: ICD-10-CM

## 2020-04-14 DIAGNOSIS — S92.901A CLOSED FRACTURE OF RIGHT FOOT, INITIAL ENCOUNTER: ICD-10-CM

## 2020-04-14 PROCEDURE — 73610 X-RAY EXAM OF ANKLE: CPT | Mod: RT

## 2020-04-14 PROCEDURE — 99284 EMERGENCY DEPT VISIT MOD MDM: CPT

## 2020-04-14 PROCEDURE — 700102 HCHG RX REV CODE 250 W/ 637 OVERRIDE(OP): Performed by: EMERGENCY MEDICINE

## 2020-04-14 PROCEDURE — 73630 X-RAY EXAM OF FOOT: CPT | Mod: RT

## 2020-04-14 PROCEDURE — A9270 NON-COVERED ITEM OR SERVICE: HCPCS | Performed by: EMERGENCY MEDICINE

## 2020-04-14 RX ORDER — IBUPROFEN 600 MG/1
600 TABLET ORAL ONCE
Status: COMPLETED | OUTPATIENT
Start: 2020-04-14 | End: 2020-04-14

## 2020-04-14 RX ORDER — OXYCODONE HYDROCHLORIDE 5 MG/1
5 TABLET ORAL EVERY 4 HOURS PRN
Qty: 18 TAB | Refills: 0 | Status: SHIPPED | OUTPATIENT
Start: 2020-04-14 | End: 2020-04-17

## 2020-04-14 RX ORDER — OXYCODONE HYDROCHLORIDE 5 MG/1
5 TABLET ORAL ONCE
Status: COMPLETED | OUTPATIENT
Start: 2020-04-15 | End: 2020-04-14

## 2020-04-14 RX ORDER — OXYCODONE HYDROCHLORIDE 5 MG/1
5 TABLET ORAL ONCE
Status: COMPLETED | OUTPATIENT
Start: 2020-04-14 | End: 2020-04-14

## 2020-04-14 RX ORDER — IBUPROFEN 600 MG/1
600 TABLET ORAL ONCE
Status: DISCONTINUED | OUTPATIENT
Start: 2020-04-15 | End: 2020-04-15 | Stop reason: HOSPADM

## 2020-04-14 RX ADMIN — OXYCODONE HYDROCHLORIDE 5 MG: 5 TABLET ORAL at 23:59

## 2020-04-14 RX ADMIN — IBUPROFEN 600 MG: 600 TABLET ORAL at 22:53

## 2020-04-14 RX ADMIN — OXYCODONE HYDROCHLORIDE 5 MG: 5 TABLET ORAL at 22:53

## 2020-04-14 ASSESSMENT — FIBROSIS 4 INDEX: FIB4 SCORE: 0.72

## 2020-04-15 PROCEDURE — 99284 EMERGENCY DEPT VISIT MOD MDM: CPT

## 2020-04-15 PROCEDURE — 302874 HCHG BANDAGE ACE 2 OR 3""

## 2020-04-15 PROCEDURE — 302875 HCHG BANDAGE ACE 4 OR 6""

## 2020-04-15 PROCEDURE — 29515 APPLICATION SHORT LEG SPLINT: CPT

## 2020-04-15 NOTE — DISCHARGE INSTRUCTIONS
You were seen in the ER for right ankle pain and swelling that is due to a broken foot/ankle.  I did speak with our on-call orthopedic surgeon who would like to see you in his office tomorrow/Wednesday.  I have given you his contact information.  We put you in a splint, please keep this in place until you follow-up with the orthopedic surgeon.  We also gave you a pair of crutches, use these and do not put any weight on the right foot as this can make the fracture worse.  I did give you a prescription for some pain medication, please take it as directed.  You should not drink alcohol or drive after taking this medication as it will make you sleepy.  As discussed in the ER, I recommend that you follow-up at the [facility] or [facility] due to the fact that you are undomiciled and do not have insurance/cannot afford the pain medication.  Your injury is going to lead to significant pain and it will be important that you get assistance in filling this prescription.  You can and should also take ibuprofen for additional pain control as well as use ice.  Please return immediately to the ER with any new or worsening symptoms.

## 2020-04-15 NOTE — ED PROVIDER NOTES
ED Provider Note    CHIEF COMPLAINT  Chief Complaint   Patient presents with   • Foot Pain     pt stepped off curb awkwardly and fell injuring her right ankle. pt has swollen painful right ankle. unable to bear weight and limited ROM. denies hitting head of LOC.        HPI  Kaela Rhodes is a 49 y.o. undomiciled female who presents with a chief complaint of right ankle pain and swelling that began yesterday after she stepped off of a curb the wrong way.  She has not taken any medication for her symptoms.  Overnight the pain is worsened after the swelling became significant.  She is having difficulty ambulating.  She denies any sensation loss.    REVIEW OF SYSTEMS  See HPI for further details.  Right ankle pain and swelling.  All other systems are negative.     PAST MEDICAL HISTORY   has a past medical history of Hypertension.    SOCIAL HISTORY  Social History     Tobacco Use   • Smoking status: Current Every Day Smoker     Packs/day: 0.20     Types: Cigarettes   • Smokeless tobacco: Never Used   Substance and Sexual Activity   • Alcohol use: Not Currently     Comment: occasional   • Drug use: No   • Sexual activity: Not on file       SURGICAL HISTORY   has a past surgical history that includes other orthopedic surgery.    CURRENT MEDICATIONS  Home Medications     Reviewed by Luis Alberto Rockwell R.N. (Registered Nurse) on 04/14/20 at 2137  Med List Status: Complete   Medication Last Dose Status   dicyclomine (BENTYL) 20 MG Tab Not Taking Active   docusate sodium 100 MG Cap  Active   omeprazole (PRILOSEC) 20 MG delayed-release capsule Not Taking Active   ondansetron (ZOFRAN ODT) 4 MG TABLET DISPERSIBLE Not Taking Active   oxycodone immediate release (ROXICODONE) 10 MG immediate release tablet Not Taking Active   penicillin v potassium (VEETID) 500 MG Tab Not Taking Active                ALLERGIES  No Known Allergies    PHYSICAL EXAM  VITAL SIGNS: /92   Pulse (!) 106   Temp 36.7 °C (98 °F) (Temporal)    "Resp 18   Ht 1.626 m (5' 4\")   Wt 69.7 kg (153 lb 10.6 oz)   LMP 03/25/2017 (Exact Date)   SpO2 96%   Breastfeeding No   BMI 26.38 kg/m²    Pulse ox interpretation: I interpret this pulse ox as normal.  Constitutional: Alert in no apparent distress.  HENT: Normocephalic, atraumatic, bilateral external ears normal. Mucous membranes moist. Nose normal.   Eyes: Pupils are equal and reactive. Conjunctiva normal, non-icteric.   Heart: Regular rate and rythm, no murmurs.  2+ right DP.  Lungs: Clear to auscultation bilaterally.  Skin: Warm, dry, no erythema, no rash.   MSK: Edema and ecchymosis over right ankle and right foot with associated tenderness along distal tibia and fibula as well as bilateral malleoli and dorsum of foot.  Wiggles all digits on the right foot.  Neurologic: Alert, grossly non-focal.   Psychiatric: Affect normal, judgment normal, mood normal, appears appropriate and not intoxicated.     COURSE & MEDICAL DECISION MAKING  Pertinent Labs & Imaging studies reviewed. (See chart for details)  This is a 49-year-old undomiciled female who is here with right foot/ankle pain and swelling after tripping off a curb yesterday.  She is tender, there is edema and ecchymosis.  X-rays and pain meds ordered.    X-rays reveal comminuted and minimally displaced intra-articular fracture base of right fifth metatarsal.  Also noted is right medial malleolar fracture extending proximally into the metaphysis with widening of the lateral mortise suggesting ligamentous injury.    Due to concerns for potential need for operative management, orthopedics was consulted.  I spoke with Dr. Colin who has recommended splint and follow-up in his clinic tomorrow.  The patient was splinted and given crutches with instructions for nonweightbearing until she follows up with orthopedics.  She was given a prescription for oxycodone.  I have recommended that she take ibuprofen and use ice for additional pain control.  I also " recommended that she follow-up with a primary care physician specifically [facility] or [facility] as I think social work is available at those 2 clinics and she could use some assistance getting Medicaid.    In prescribing controlled substances to this patient, I certify that I have obtained and reviewed the medical history of Kaela Rhodes. I have also made a good arcelia effort to obtain applicable records from other providers who have treated the patient and records did not demonstrate any increased risk of substance abuse that would prevent me from prescribing controlled substances.     I have conducted a physical exam and documented it. I have reviewed Ms. Rhodes’s prescription history as maintained by the [place] Prescription Monitoring Program.     I have assessed the patient’s risk for abuse, dependency, and addiction using the validated Opioid Risk Tool available at https://www.mdcalc.com/acwlpe-uoyn-lubk-ort-narcotic-abuse.     Given the above, I believe the benefits of controlled substance therapy outweigh the risks. The reasons for prescribing controlled substances include in my professional opinion, controlled substances are the only reasonable choice for this patient because Of the nature of her injury. Accordingly, I have discussed the risk and benefits, treatment plan, and alternative therapies with the patient.     The patient will not drink alcohol nor drive with prescribed medications. The patient will return for worsening symptoms and is stable at the time of discharge. The patient verbalizes understanding and will comply.    FINAL IMPRESSION  1.  Fifth metatarsal fracture  2.  Ankle fracture    Electronically signed by: Daniel Velasquez M.D., 4/14/2020 9:51 PM

## 2020-04-15 NOTE — ED NOTES
Patient resting comfortably, denies needs at this time. Call light in reach. Bed in low position. A&O x 4.

## 2020-04-15 NOTE — ED NOTES
Pt given discharge instructions. RN answered questions. VSS. Pt in wheelchair out to MARIANA riley.

## 2020-04-15 NOTE — ED TRIAGE NOTES
"Bib SO for above complaints. SO asked to wait in car.     Chief Complaint   Patient presents with   • Foot Pain     pt stepped off curb awkwardly and fell injuring her right ankle. pt has swollen painful right ankle. unable to bear weight and limited ROM. denies hitting head of LOC.      /92   Pulse (!) 106   Temp 36.7 °C (98 °F) (Temporal)   Resp 18   Ht 1.626 m (5' 4\")   LMP 03/25/2017 (Exact Date)   SpO2 96%   Breastfeeding No   BMI 23.31 kg/m²     Negative PUI screen  "

## 2020-05-03 ENCOUNTER — APPOINTMENT (OUTPATIENT)
Dept: RADIOLOGY | Facility: MEDICAL CENTER | Age: 50
DRG: 603 | End: 2020-05-03
Attending: EMERGENCY MEDICINE
Payer: MEDICAID

## 2020-05-03 ENCOUNTER — HOSPITAL ENCOUNTER (INPATIENT)
Facility: MEDICAL CENTER | Age: 50
LOS: 2 days | DRG: 603 | End: 2020-05-05
Attending: EMERGENCY MEDICINE | Admitting: INTERNAL MEDICINE
Payer: MEDICAID

## 2020-05-03 DIAGNOSIS — L02.91 ABSCESS: ICD-10-CM

## 2020-05-03 DIAGNOSIS — L03.211 FACIAL CELLULITIS: ICD-10-CM

## 2020-05-03 PROBLEM — D72.829 LEUKOCYTOSIS: Status: ACTIVE | Noted: 2020-05-03

## 2020-05-03 PROBLEM — F17.200 TOBACCO DEPENDENCE: Status: ACTIVE | Noted: 2020-05-03

## 2020-05-03 PROBLEM — I10 HYPERTENSION, ESSENTIAL: Status: ACTIVE | Noted: 2020-05-03

## 2020-05-03 PROBLEM — L02.01 FACIAL ABSCESS: Status: ACTIVE | Noted: 2020-05-03

## 2020-05-03 LAB
ANION GAP SERPL CALC-SCNC: 15 MMOL/L (ref 7–16)
BASOPHILS # BLD AUTO: 0.4 % (ref 0–1.8)
BASOPHILS # BLD: 0.06 K/UL (ref 0–0.12)
BUN SERPL-MCNC: 10 MG/DL (ref 8–22)
CALCIUM SERPL-MCNC: 9.6 MG/DL (ref 8.4–10.2)
CHLORIDE SERPL-SCNC: 98 MMOL/L (ref 96–112)
CO2 SERPL-SCNC: 26 MMOL/L (ref 20–33)
CREAT SERPL-MCNC: 0.68 MG/DL (ref 0.5–1.4)
EOSINOPHIL # BLD AUTO: 0.24 K/UL (ref 0–0.51)
EOSINOPHIL NFR BLD: 1.7 % (ref 0–6.9)
ERYTHROCYTE [DISTWIDTH] IN BLOOD BY AUTOMATED COUNT: 43.3 FL (ref 35.9–50)
GLUCOSE SERPL-MCNC: 117 MG/DL (ref 65–99)
HCT VFR BLD AUTO: 45.7 % (ref 37–47)
HGB BLD-MCNC: 15 G/DL (ref 12–16)
IMM GRANULOCYTES # BLD AUTO: 0.08 K/UL (ref 0–0.11)
IMM GRANULOCYTES NFR BLD AUTO: 0.6 % (ref 0–0.9)
LYMPHOCYTES # BLD AUTO: 2.6 K/UL (ref 1–4.8)
LYMPHOCYTES NFR BLD: 18 % (ref 22–41)
MCH RBC QN AUTO: 29.6 PG (ref 27–33)
MCHC RBC AUTO-ENTMCNC: 32.8 G/DL (ref 33.6–35)
MCV RBC AUTO: 90.3 FL (ref 81.4–97.8)
MONOCYTES # BLD AUTO: 1.08 K/UL (ref 0–0.85)
MONOCYTES NFR BLD AUTO: 7.5 % (ref 0–13.4)
NEUTROPHILS # BLD AUTO: 10.36 K/UL (ref 2–7.15)
NEUTROPHILS NFR BLD: 71.8 % (ref 44–72)
NRBC # BLD AUTO: 0 K/UL
NRBC BLD-RTO: 0 /100 WBC
PLATELET # BLD AUTO: 352 K/UL (ref 164–446)
PMV BLD AUTO: 9.1 FL (ref 9–12.9)
POTASSIUM SERPL-SCNC: 4.1 MMOL/L (ref 3.6–5.5)
RBC # BLD AUTO: 5.06 M/UL (ref 4.2–5.4)
SODIUM SERPL-SCNC: 139 MMOL/L (ref 135–145)
WBC # BLD AUTO: 14.4 K/UL (ref 4.8–10.8)

## 2020-05-03 PROCEDURE — 80048 BASIC METABOLIC PNL TOTAL CA: CPT

## 2020-05-03 PROCEDURE — A9270 NON-COVERED ITEM OR SERVICE: HCPCS | Performed by: INTERNAL MEDICINE

## 2020-05-03 PROCEDURE — 99223 1ST HOSP IP/OBS HIGH 75: CPT | Mod: 25 | Performed by: INTERNAL MEDICINE

## 2020-05-03 PROCEDURE — 99285 EMERGENCY DEPT VISIT HI MDM: CPT

## 2020-05-03 PROCEDURE — 87040 BLOOD CULTURE FOR BACTERIA: CPT

## 2020-05-03 PROCEDURE — 0J913ZZ DRAINAGE OF FACE SUBCUTANEOUS TISSUE AND FASCIA, PERCUTANEOUS APPROACH: ICD-10-PCS | Performed by: EMERGENCY MEDICINE

## 2020-05-03 PROCEDURE — 700101 HCHG RX REV CODE 250: Performed by: EMERGENCY MEDICINE

## 2020-05-03 PROCEDURE — 770006 HCHG ROOM/CARE - MED/SURG/GYN SEMI*

## 2020-05-03 PROCEDURE — 85025 COMPLETE CBC W/AUTO DIFF WBC: CPT

## 2020-05-03 PROCEDURE — 700105 HCHG RX REV CODE 258: Performed by: EMERGENCY MEDICINE

## 2020-05-03 PROCEDURE — 99407 BEHAV CHNG SMOKING > 10 MIN: CPT | Performed by: INTERNAL MEDICINE

## 2020-05-03 PROCEDURE — 700111 HCHG RX REV CODE 636 W/ 250 OVERRIDE (IP): Performed by: EMERGENCY MEDICINE

## 2020-05-03 PROCEDURE — 700111 HCHG RX REV CODE 636 W/ 250 OVERRIDE (IP): Performed by: INTERNAL MEDICINE

## 2020-05-03 PROCEDURE — 10160 PNXR ASPIR ABSC HMTMA BULLA: CPT

## 2020-05-03 PROCEDURE — 70487 CT MAXILLOFACIAL W/DYE: CPT

## 2020-05-03 PROCEDURE — 700117 HCHG RX CONTRAST REV CODE 255: Performed by: EMERGENCY MEDICINE

## 2020-05-03 PROCEDURE — 36415 COLL VENOUS BLD VENIPUNCTURE: CPT

## 2020-05-03 PROCEDURE — 96375 TX/PRO/DX INJ NEW DRUG ADDON: CPT

## 2020-05-03 PROCEDURE — 700105 HCHG RX REV CODE 258: Performed by: INTERNAL MEDICINE

## 2020-05-03 PROCEDURE — 96365 THER/PROPH/DIAG IV INF INIT: CPT

## 2020-05-03 PROCEDURE — 700102 HCHG RX REV CODE 250 W/ 637 OVERRIDE(OP): Performed by: INTERNAL MEDICINE

## 2020-05-03 RX ORDER — PROMETHAZINE HYDROCHLORIDE 25 MG/1
12.5-25 TABLET ORAL EVERY 4 HOURS PRN
Status: DISCONTINUED | OUTPATIENT
Start: 2020-05-03 | End: 2020-05-05 | Stop reason: HOSPADM

## 2020-05-03 RX ORDER — BISACODYL 10 MG
10 SUPPOSITORY, RECTAL RECTAL
Status: DISCONTINUED | OUTPATIENT
Start: 2020-05-03 | End: 2020-05-05 | Stop reason: HOSPADM

## 2020-05-03 RX ORDER — AMOXICILLIN 250 MG
2 CAPSULE ORAL 2 TIMES DAILY
Status: DISCONTINUED | OUTPATIENT
Start: 2020-05-03 | End: 2020-05-05 | Stop reason: HOSPADM

## 2020-05-03 RX ORDER — ENALAPRILAT 1.25 MG/ML
1.25 INJECTION INTRAVENOUS EVERY 6 HOURS PRN
Status: DISCONTINUED | OUTPATIENT
Start: 2020-05-03 | End: 2020-05-05 | Stop reason: HOSPADM

## 2020-05-03 RX ORDER — SODIUM CHLORIDE 9 MG/ML
INJECTION, SOLUTION INTRAVENOUS CONTINUOUS
Status: DISCONTINUED | OUTPATIENT
Start: 2020-05-03 | End: 2020-05-05

## 2020-05-03 RX ORDER — ONDANSETRON 4 MG/1
4 TABLET, ORALLY DISINTEGRATING ORAL EVERY 4 HOURS PRN
Status: DISCONTINUED | OUTPATIENT
Start: 2020-05-03 | End: 2020-05-05 | Stop reason: HOSPADM

## 2020-05-03 RX ORDER — POLYETHYLENE GLYCOL 3350 17 G/17G
1 POWDER, FOR SOLUTION ORAL
Status: DISCONTINUED | OUTPATIENT
Start: 2020-05-03 | End: 2020-05-05 | Stop reason: HOSPADM

## 2020-05-03 RX ORDER — ONDANSETRON 2 MG/ML
4 INJECTION INTRAMUSCULAR; INTRAVENOUS ONCE
Status: COMPLETED | OUTPATIENT
Start: 2020-05-03 | End: 2020-05-03

## 2020-05-03 RX ORDER — ONDANSETRON 2 MG/ML
4 INJECTION INTRAMUSCULAR; INTRAVENOUS EVERY 4 HOURS PRN
Status: DISCONTINUED | OUTPATIENT
Start: 2020-05-03 | End: 2020-05-05 | Stop reason: HOSPADM

## 2020-05-03 RX ORDER — PROMETHAZINE HYDROCHLORIDE 25 MG/1
12.5-25 SUPPOSITORY RECTAL EVERY 4 HOURS PRN
Status: DISCONTINUED | OUTPATIENT
Start: 2020-05-03 | End: 2020-05-05 | Stop reason: HOSPADM

## 2020-05-03 RX ORDER — CLINDAMYCIN PHOSPHATE 600 MG/50ML
600 INJECTION, SOLUTION INTRAVENOUS ONCE
Status: COMPLETED | OUTPATIENT
Start: 2020-05-03 | End: 2020-05-03

## 2020-05-03 RX ORDER — PROCHLORPERAZINE EDISYLATE 5 MG/ML
5-10 INJECTION INTRAMUSCULAR; INTRAVENOUS EVERY 4 HOURS PRN
Status: DISCONTINUED | OUTPATIENT
Start: 2020-05-03 | End: 2020-05-05 | Stop reason: HOSPADM

## 2020-05-03 RX ORDER — OXYCODONE HYDROCHLORIDE 5 MG/1
5 TABLET ORAL
Status: DISCONTINUED | OUTPATIENT
Start: 2020-05-03 | End: 2020-05-05 | Stop reason: HOSPADM

## 2020-05-03 RX ORDER — NICOTINE 21 MG/24HR
14 PATCH, TRANSDERMAL 24 HOURS TRANSDERMAL
Status: DISCONTINUED | OUTPATIENT
Start: 2020-05-03 | End: 2020-05-05 | Stop reason: HOSPADM

## 2020-05-03 RX ORDER — ACETAMINOPHEN 325 MG/1
650 TABLET ORAL EVERY 6 HOURS PRN
Status: DISCONTINUED | OUTPATIENT
Start: 2020-05-03 | End: 2020-05-05 | Stop reason: HOSPADM

## 2020-05-03 RX ORDER — SODIUM CHLORIDE 9 MG/ML
INJECTION, SOLUTION INTRAVENOUS CONTINUOUS
Status: DISCONTINUED | OUTPATIENT
Start: 2020-05-03 | End: 2020-05-04

## 2020-05-03 RX ORDER — HYDROMORPHONE HYDROCHLORIDE 1 MG/ML
1 INJECTION, SOLUTION INTRAMUSCULAR; INTRAVENOUS; SUBCUTANEOUS ONCE
Status: COMPLETED | OUTPATIENT
Start: 2020-05-03 | End: 2020-05-03

## 2020-05-03 RX ORDER — OXYCODONE HYDROCHLORIDE 5 MG/1
2.5 TABLET ORAL
Status: DISCONTINUED | OUTPATIENT
Start: 2020-05-03 | End: 2020-05-05 | Stop reason: HOSPADM

## 2020-05-03 RX ORDER — HYDROMORPHONE HYDROCHLORIDE 1 MG/ML
0.25 INJECTION, SOLUTION INTRAMUSCULAR; INTRAVENOUS; SUBCUTANEOUS
Status: DISCONTINUED | OUTPATIENT
Start: 2020-05-03 | End: 2020-05-05 | Stop reason: HOSPADM

## 2020-05-03 RX ADMIN — VANCOMYCIN HYDROCHLORIDE 1500 MG: 500 INJECTION, POWDER, LYOPHILIZED, FOR SOLUTION INTRAVENOUS at 13:20

## 2020-05-03 RX ADMIN — AMPICILLIN SODIUM AND SULBACTAM SODIUM 3 G: 2; 1 INJECTION, POWDER, FOR SOLUTION INTRAMUSCULAR; INTRAVENOUS at 18:09

## 2020-05-03 RX ADMIN — NICOTINE 14 MG: 14 PATCH, EXTENDED RELEASE TRANSDERMAL at 12:14

## 2020-05-03 RX ADMIN — CLINDAMYCIN IN 5 PERCENT DEXTROSE 600 MG: 12 INJECTION, SOLUTION INTRAVENOUS at 09:34

## 2020-05-03 RX ADMIN — HYDROMORPHONE HYDROCHLORIDE 1 MG: 1 INJECTION, SOLUTION INTRAMUSCULAR; INTRAVENOUS; SUBCUTANEOUS at 09:34

## 2020-05-03 RX ADMIN — AMPICILLIN SODIUM AND SULBACTAM SODIUM 3 G: 2; 1 INJECTION, POWDER, FOR SOLUTION INTRAMUSCULAR; INTRAVENOUS at 12:12

## 2020-05-03 RX ADMIN — OXYCODONE HYDROCHLORIDE 5 MG: 5 TABLET ORAL at 12:14

## 2020-05-03 RX ADMIN — SODIUM CHLORIDE: 9 INJECTION, SOLUTION INTRAVENOUS at 12:11

## 2020-05-03 RX ADMIN — SENNOSIDES AND DOCUSATE SODIUM 2 TABLET: 8.6; 5 TABLET ORAL at 12:14

## 2020-05-03 RX ADMIN — ONDANSETRON 4 MG: 2 INJECTION INTRAMUSCULAR; INTRAVENOUS at 09:34

## 2020-05-03 RX ADMIN — IOHEXOL 80 ML: 350 INJECTION, SOLUTION INTRAVENOUS at 09:50

## 2020-05-03 RX ADMIN — HYDROMORPHONE HYDROCHLORIDE 0.25 MG: 1 INJECTION, SOLUTION INTRAMUSCULAR; INTRAVENOUS; SUBCUTANEOUS at 16:18

## 2020-05-03 RX ADMIN — HYDROMORPHONE HYDROCHLORIDE 0.25 MG: 1 INJECTION, SOLUTION INTRAMUSCULAR; INTRAVENOUS; SUBCUTANEOUS at 20:17

## 2020-05-03 RX ADMIN — SODIUM CHLORIDE: 9 INJECTION, SOLUTION INTRAVENOUS at 09:34

## 2020-05-03 ASSESSMENT — LIFESTYLE VARIABLES
EVER FELT BAD OR GUILTY ABOUT YOUR DRINKING: NO
HOW MANY TIMES IN THE PAST YEAR HAVE YOU HAD 5 OR MORE DRINKS IN A DAY: 0
TOTAL SCORE: 0
ALCOHOL_USE: NO
AVERAGE NUMBER OF DAYS PER WEEK YOU HAVE A DRINK CONTAINING ALCOHOL: 0
EVER HAD A DRINK FIRST THING IN THE MORNING TO STEADY YOUR NERVES TO GET RID OF A HANGOVER: NO
EVER_SMOKED: YES
HAVE PEOPLE ANNOYED YOU BY CRITICIZING YOUR DRINKING: NO
TOTAL SCORE: 0
CONSUMPTION TOTAL: NEGATIVE
HAVE YOU EVER FELT YOU SHOULD CUT DOWN ON YOUR DRINKING: NO
TOTAL SCORE: 0
SUBSTANCE_ABUSE: 0
ON A TYPICAL DAY WHEN YOU DRINK ALCOHOL HOW MANY DRINKS DO YOU HAVE: 0

## 2020-05-03 ASSESSMENT — COGNITIVE AND FUNCTIONAL STATUS - GENERAL
STANDING UP FROM CHAIR USING ARMS: A LITTLE
TOILETING: A LITTLE
WALKING IN HOSPITAL ROOM: A LITTLE
MOBILITY SCORE: 21
SUGGESTED CMS G CODE MODIFIER MOBILITY: CJ
CLIMB 3 TO 5 STEPS WITH RAILING: A LITTLE
DAILY ACTIVITIY SCORE: 23
SUGGESTED CMS G CODE MODIFIER DAILY ACTIVITY: CI

## 2020-05-03 ASSESSMENT — ENCOUNTER SYMPTOMS
ABDOMINAL PAIN: 0
CHILLS: 0
EYE PAIN: 0
PND: 0
HEADACHES: 0
SEIZURES: 0
NAUSEA: 0
WHEEZING: 0
SPUTUM PRODUCTION: 0
CONSTIPATION: 0
PALPITATIONS: 0
SHORTNESS OF BREATH: 0
DIARRHEA: 0
COUGH: 0
WEIGHT LOSS: 0
DIZZINESS: 0
SPEECH CHANGE: 0
FEVER: 0
DEPRESSION: 0
FALLS: 0
NECK PAIN: 0
TREMORS: 0
WEAKNESS: 0
BACK PAIN: 0
HEARTBURN: 0
BLURRED VISION: 0
SINUS PAIN: 1
VOMITING: 0

## 2020-05-03 ASSESSMENT — FIBROSIS 4 INDEX: FIB4 SCORE: 0.73

## 2020-05-03 ASSESSMENT — PATIENT HEALTH QUESTIONNAIRE - PHQ9
2. FEELING DOWN, DEPRESSED, IRRITABLE, OR HOPELESS: NOT AT ALL
SUM OF ALL RESPONSES TO PHQ9 QUESTIONS 1 AND 2: 0
1. LITTLE INTEREST OR PLEASURE IN DOING THINGS: NOT AT ALL

## 2020-05-03 NOTE — ED NOTES
ERP at bedside. Pt agrees with plan of care discussed by ERP. AIDET acknowledged with patient. Fabrizio in low position, side rail up for pt safety. Call light within reach. Will continue to monitor.

## 2020-05-03 NOTE — PROGRESS NOTES
"Pharmacy Kinetics 50 y.o. female on vancomycin day # 1 5/3/2020    Currently on Vancomycin 1500 mg iv given 5/3/20 1320  Provider specified end date: to be determined    Indication for Treatment: Facial cellulitis and abscess    Pertinent history per medical record: Admitted on 5/3/2020 for lower chin swelling and pain.  PMH: HTN, substance abuse    Other antibiotics: Unasyn 3 gm IV Q6H    Allergies: Patient has no known allergies.     List concerns for renal function: none at this time    Pertinent cultures to date:   5/3 BCpx2 in process    MRSA nares swab if pneumonia is a concern (ordered/positive/negative/n-a): NA    Recent Labs     20  0930   WBC 14.4*   NEUTSPOLYS 71.80     Recent Labs     20  0930   BUN 10   CREATININE 0.68     No results for input(s): VANCOTROUGH, VANCOPEAK, VANCORANDOM in the last 72 hours.No intake or output data in the 24 hours ending 20 1416   /90   Pulse 97   Temp 36.8 °C (98.3 °F) (Axillary)   Resp 18   Ht 1.626 m (5' 4\")   Wt 61.6 kg (135 lb 12.9 oz)   SpO2 99%  Temp (24hrs), Av.8 °C (98.3 °F), Min:36.8 °C (98.2 °F), Max:36.8 °C (98.3 °F)      A/P   1. Vancomycin dose change: 1000 mg IV Q12H  2. Next vancomycin level:  1230  3. Goal trough: 12 - 16 mcg/ml  4. Comments: Plan to check trough prior to 3rd dose and adjust if needed per protocol.    DENISHA Pereyra PharmD    "

## 2020-05-03 NOTE — ASSESSMENT & PLAN NOTE
CT confirmed a superficial facial abscess and cellulitis  ED performed I&D  Culture pending  WBC improving  Add warm compress QID to facilitate spontaneous drainage  Continue IV Unasyn and vancomycin

## 2020-05-03 NOTE — PROGRESS NOTES
Med rec updated and complete  Allergies reviewed  Called pt in her room, to verify prescription medications and over the counter.  Pt reports no prescription medications, OTC's, or vitamins  Pt reports no antibiotics in the last 2 weeks

## 2020-05-03 NOTE — PROGRESS NOTES
Pt up to bathroom, complains of increased pain and some slight difficulty swallowing, swelling noted to be traveling down chin into neck. Page out to Dr. Gillespie.

## 2020-05-03 NOTE — H&P
Hospital Medicine History & Physical Note    Date of Service  5/3/2020    Primary Care Physician  Pcp Pt States None    Code Status  Prior    Chief Complaint  Chief Complaint   Patient presents with   • Oral Swelling   • Dental Pain       History of Presenting Illness  50 y.o. female who presented 5/3/2020 with past medical history of hypertension, substance use, presented with complaint of complaint of lower chin swelling and pain.  Patient's she started noting seeing lower chin swelling and tenderness several days ago and getting worse.  Patient came to the ER for evaluation.  Patient denies fever, chills, nausea vomiting diarrhea or shortness of breath.  Patient said the pain is severe. Patient's pain is local 7-8/10, intermittent and does not radiate to other location, sharp and with some tingling. Can be controlled by pain meds. Dressing in place.  Patient denied history of dental abscess.  Patient also denies trauma.  In the ER CT confirmed with superficial abscess and ER physician performed a needle aspiration with success.  Culture is pending.  Patient was recommended to be admitted to the hospital for IV antibiotics.  Patient refused further surgical I&D at this moment.    Review of Systems  Review of Systems   Constitutional: Positive for malaise/fatigue. Negative for chills, fever and weight loss.   HENT: Positive for sinus pain. Negative for congestion, ear discharge, ear pain, hearing loss and nosebleeds.         Facial pain and dental   Eyes: Negative for blurred vision and pain.   Respiratory: Negative for cough, sputum production, shortness of breath and wheezing.    Cardiovascular: Negative for chest pain, palpitations, leg swelling and PND.   Gastrointestinal: Negative for abdominal pain, constipation, diarrhea, heartburn, nausea and vomiting.   Genitourinary: Negative for dysuria, frequency and hematuria.   Musculoskeletal: Negative for back pain, falls, joint pain and neck pain.   Skin: Positive  for rash.   Neurological: Negative for dizziness, tremors, speech change, seizures, weakness and headaches.   Psychiatric/Behavioral: Negative for depression, substance abuse and suicidal ideas.       Past Medical History   has a past medical history of Hypertension.    Surgical History   has a past surgical history that includes other orthopedic surgery.     Family History  Family history reviewed , felt nonpertinent to this encounter.    Social History   reports that she has been smoking cigarettes. She has been smoking about 0.20 packs per day. She has never used smokeless tobacco. She reports previous alcohol use. She reports that she does not use drugs.    Allergies  No Known Allergies    Medications  Prior to Admission Medications   Prescriptions Last Dose Informant Patient Reported? Taking?   dicyclomine (BENTYL) 20 MG Tab   No No   Sig: Take 1 Tab by mouth every 6 hours.   Patient not taking: Reported on 4/14/2020   docusate sodium 100 MG Cap   Yes No   Sig: Take 100 mg by mouth 2 times a day.   omeprazole (PRILOSEC) 20 MG delayed-release capsule   No No   Sig: Take 1 Cap by mouth every day.   Patient not taking: Reported on 4/14/2020   ondansetron (ZOFRAN ODT) 4 MG TABLET DISPERSIBLE   No No   Sig: Take 1 Tab by mouth every 8 hours as needed for Nausea/Vomiting.   Patient not taking: Reported on 4/14/2020   oxycodone immediate release (ROXICODONE) 10 MG immediate release tablet   No No   Sig: Take 0.5-1 Tabs by mouth every 3 hours as needed for Moderate Pain ((4-6)).   Patient not taking: Reported on 4/14/2020   penicillin v potassium (VEETID) 500 MG Tab   No No   Sig: Take 1 Tab by mouth 4 times a day.   Patient not taking: Reported on 4/14/2020      Facility-Administered Medications: None       Physical Exam  Temp:  [36.8 °C (98.2 °F)] 36.8 °C (98.2 °F)  Pulse:  [] 95  Resp:  [18] 18  BP: (174-182)/(94-99) 174/94  SpO2:  [86 %-99 %] 86 %    Physical Exam  Vitals signs and nursing note reviewed.    Constitutional:       General: She is not in acute distress.     Appearance: Normal appearance. She is normal weight.   HENT:      Head: Normocephalic and atraumatic.      Right Ear: Tympanic membrane, ear canal and external ear normal.      Left Ear: Tympanic membrane, ear canal and external ear normal.      Nose: Nose normal.      Mouth/Throat:      Mouth: Mucous membranes are moist.      Pharynx: Oropharynx is clear.   Eyes:      Extraocular Movements: Extraocular movements intact.      Conjunctiva/sclera: Conjunctivae normal.      Pupils: Pupils are equal, round, and reactive to light.   Neck:      Musculoskeletal: Normal range of motion and neck supple. No neck rigidity.      Vascular: No carotid bruit.   Cardiovascular:      Rate and Rhythm: Normal rate and regular rhythm.      Pulses: Normal pulses.      Heart sounds: Normal heart sounds. No murmur. No friction rub. No gallop.    Pulmonary:      Effort: Pulmonary effort is normal. No respiratory distress.      Breath sounds: Normal breath sounds. No stridor. No wheezing, rhonchi or rales.   Chest:      Chest wall: No tenderness.   Abdominal:      General: Abdomen is flat. Bowel sounds are normal. There is no distension.      Palpations: Abdomen is soft. There is no mass.      Tenderness: There is no abdominal tenderness. There is no guarding or rebound.      Hernia: No hernia is present.   Musculoskeletal: Normal range of motion.         General: No swelling.   Lymphadenopathy:      Cervical: No cervical adenopathy.   Skin:     General: Skin is warm.      Capillary Refill: Capillary refill takes more than 3 seconds.      Coloration: Skin is not jaundiced or pale.      Findings: Erythema and lesion present.      Comments: Facial swelling and erythema cellulitis   Neurological:      General: No focal deficit present.      Mental Status: She is alert and oriented to person, place, and time. Mental status is at baseline.   Psychiatric:         Mood and Affect:  Mood normal.         Behavior: Behavior normal.         Laboratory:  Recent Labs     05/03/20  0930   WBC 14.4*   RBC 5.06   HEMOGLOBIN 15.0   HEMATOCRIT 45.7   MCV 90.3   MCH 29.6   MCHC 32.8*   RDW 43.3   PLATELETCT 352   MPV 9.1     Recent Labs     05/03/20  0930   SODIUM 139   POTASSIUM 4.1   CHLORIDE 98   CO2 26   GLUCOSE 117*   BUN 10   CREATININE 0.68   CALCIUM 9.6     Recent Labs     05/03/20  0930   GLUCOSE 117*         No results for input(s): NTPROBNP in the last 72 hours.      No results for input(s): TROPONINT in the last 72 hours.    Imaging:  CT-MAXILLOFACIAL WITH PLUS RECONS   Final Result      1.  There is a superficial subcutaneous abscess just anterior to the mandible in the midline with surrounding phlegmonous enhancement of the subcutaneous tissues.   2.  There are reactive level 1 and level 2 lymph nodes.   3.  There is no CT evidence of acute osteomyelitis.   4.  There are old left-sided facial fractures noted.            Assessment/Plan:    * Facial abscess- (present on admission)  Assessment & Plan  CT confirmed a superficial facial abscess and cellulitis  ED performed I&D  Culture in place and pending  no signs of systemic sepsis  Start patient on IV antibiotics Unasyn and vancomycin  Following culture result    Tobacco dependence- (present on admission)  Assessment & Plan  - Smoking cessation education provided  - Nicotine patch  I spent 11 minutes on tobacco cessation counseling including nicotine patches, gum, and dangers of smoking. Also discussed options of nicotine patch, medical treatment with wellbutrin and chantix. Discussed the risks of smoking including increased risk of heart disease, stroke, cancer and COPD. Discussed the benefits of quitting smoking. Nicotine replacement protocol provided to the patient.      47930 (smoking and tobacco cessation counseling visit; intensive, greater than 10 minutes).      Hypertension, essential- (present on admission)  Assessment & Plan  No  known history of medication  Possibly due to pain  PRN Vasotec ordered    Leukocytosis- (present on admission)  Assessment & Plan  Likely from facial abscess and cellulitis  Start antibiotics see above    DVT prophylaxis SCD and Lovenox    Anticipated hospital stay more than 2 midnights for IV antibiotics and close monitor

## 2020-05-03 NOTE — ED PROVIDER NOTES
ED Provider Note    CHIEF COMPLAINT  No chief complaint on file.      HPI  Kaela Rhodes is a 50 y.o. female who presents to the emergency department the chief complaint of dental pain and facial swelling.  The patient says that for the last several days she has had worsening dental pain and facial swelling.  Became more swollen this morning and therefore came the emergency department.  Patient notes swelling along the mandible.  She says that all of her teeth are bad.  She thinks that 1 of the front teeth in the lower jaw is where it may have started.  She is not sure if she is had a fever.  She rates the pain as severe.    REVIEW OF SYSTEMS  See HPI for further details. All other systems are negative.     PAST MEDICAL HISTORY  Past Medical History:   Diagnosis Date   • Hypertension        FAMILY HISTORY  No family history on file.    SOCIAL HISTORY  Social History     Socioeconomic History   • Marital status:      Spouse name: Not on file   • Number of children: Not on file   • Years of education: Not on file   • Highest education level: Not on file   Occupational History   • Not on file   Social Needs   • Financial resource strain: Not on file   • Food insecurity     Worry: Not on file     Inability: Not on file   • Transportation needs     Medical: Not on file     Non-medical: Not on file   Tobacco Use   • Smoking status: Current Every Day Smoker     Packs/day: 0.20     Types: Cigarettes   • Smokeless tobacco: Never Used   Substance and Sexual Activity   • Alcohol use: Not Currently     Comment: occasional   • Drug use: No   • Sexual activity: Not on file   Lifestyle   • Physical activity     Days per week: Not on file     Minutes per session: Not on file   • Stress: Not on file   Relationships   • Social connections     Talks on phone: Not on file     Gets together: Not on file     Attends Anabaptism service: Not on file     Active member of club or organization: Not on file     Attends meetings  "of clubs or organizations: Not on file     Relationship status: Not on file   • Intimate partner violence     Fear of current or ex partner: Not on file     Emotionally abused: Not on file     Physically abused: Not on file     Forced sexual activity: Not on file   Other Topics Concern   • Not on file   Social History Narrative   • Not on file       SURGICAL HISTORY  Past Surgical History:   Procedure Laterality Date   • OTHER ORTHOPEDIC SURGERY      rt knee miniscus  removal x2 , bite repair rt ankle       CURRENT MEDICATIONS  Home Medications    **Home medications have not yet been reviewed for this encounter**         ALLERGIES  No Known Allergies    PHYSICAL EXAM  VITAL SIGNS: BP (!) 182/99   Pulse (!) 103   Temp 36.8 °C (98.2 °F) (Temporal)   Resp 18   Ht 1.626 m (5' 4\")   Wt 61.6 kg (135 lb 12.9 oz)   LMP 03/25/2017 (Exact Date)   SpO2 99%   BMI 23.31 kg/m²   Constitutional:  Well developed, Well nourished, moderate to severe distress secondary to pain, Non-toxic appearance.   HENT: Swelling over the chin and anterior aspect of the mandible diffusely including the lower lip.  Diffuse dental caries noted.  No discrete palpable abscess.  The floor the mouth is soft.  Airway is patent.  No anterior neck swelling or erythema.  Eyes: PERRLA, EOMI, Conjunctiva normal, No discharge.   Neck: Normal range of motion, No tenderness, Supple, No stridor.   Lymphatic: No lymphadenopathy noted.   Cardiovascular: Normal heart rate, Normal rhythm, No murmurs, No rubs, No gallops.   Thorax & Lungs: Normal breath sounds, No respiratory distress, No wheezing, No chest tenderness.   Skin: Warm, Dry, No erythema, No rash.  Facial cellulitis as above.  Extremities: Intact distal pulses, No edema, No tenderness, No cyanosis, No clubbing.   Neurologic: Alert & oriented x 3, Normal motor function, Normal sensory function, No focal deficits noted.   Psychiatric: Affect normal, Judgment normal, Mood normal. "     RADIOLOGY/PROCEDURES    Needle Aspiration    Indication: Abscess    Procedure: The patient was positioned appropriately and the skin over the incision site was prepped with chlorhexidine. Local anesthesia was obtained by infiltration using PainEase.  An 18 gauge needle was Inserted into the central portion of the lesion and approximately 1 cc of thick, purulent and bloody material was expressed. Bandage applied  The patient tolerated the procedure with difficulty.    Complications: None        COURSE & MEDICAL DECISION MAKING  Pertinent Labs & Imaging studies reviewed. (See chart for details)    The patient presents today with facial cellulitis and infection.  Likely secondary to a dental infection.  An IV is established laboratory studies are obtained obtained.  Patient is given hydromorphone for pain control.  Treated with clindamycin for infection.    Patient presents today with facial cellulitis.  CT scan demonstrates a subcutaneous abscess.  I performed a needle aspiration of this and did express some purulent drainage.  The patient declined further incision and drainage.  Patient was treated initially with clindamycin as I had a suspicion for dental infection.  However seeing that this is more of a subcutaneous abscess I gave her a dose of vancomycin.  Patient will be admitted to the hospital for further evaluation and treatment.  Spoke with Dr. Pan for admission.    FINAL IMPRESSION  1. Facial cellulitis    2. Abscess            Electronically signed by: Mukul Leonard M.D., 5/3/2020 9:53 AM

## 2020-05-04 LAB
MRSA DNA SPEC QL NAA+PROBE: NORMAL
SIGNIFICANT IND 70042: NORMAL
SITE SITE: NORMAL
SOURCE SOURCE: NORMAL
VANCOMYCIN TROUGH SERPL-MCNC: 7.3 UG/ML (ref 10–20)

## 2020-05-04 PROCEDURE — 80202 ASSAY OF VANCOMYCIN: CPT

## 2020-05-04 PROCEDURE — 700105 HCHG RX REV CODE 258: Performed by: INTERNAL MEDICINE

## 2020-05-04 PROCEDURE — 700102 HCHG RX REV CODE 250 W/ 637 OVERRIDE(OP): Performed by: INTERNAL MEDICINE

## 2020-05-04 PROCEDURE — 99233 SBSQ HOSP IP/OBS HIGH 50: CPT | Performed by: INTERNAL MEDICINE

## 2020-05-04 PROCEDURE — 36415 COLL VENOUS BLD VENIPUNCTURE: CPT

## 2020-05-04 PROCEDURE — 87641 MR-STAPH DNA AMP PROBE: CPT

## 2020-05-04 PROCEDURE — 770006 HCHG ROOM/CARE - MED/SURG/GYN SEMI*

## 2020-05-04 PROCEDURE — 700111 HCHG RX REV CODE 636 W/ 250 OVERRIDE (IP): Performed by: INTERNAL MEDICINE

## 2020-05-04 PROCEDURE — A9270 NON-COVERED ITEM OR SERVICE: HCPCS | Performed by: INTERNAL MEDICINE

## 2020-05-04 RX ADMIN — NICOTINE 14 MG: 14 PATCH, EXTENDED RELEASE TRANSDERMAL at 05:57

## 2020-05-04 RX ADMIN — AMPICILLIN SODIUM AND SULBACTAM SODIUM 3 G: 2; 1 INJECTION, POWDER, FOR SOLUTION INTRAMUSCULAR; INTRAVENOUS at 12:36

## 2020-05-04 RX ADMIN — SENNOSIDES AND DOCUSATE SODIUM 2 TABLET: 8.6; 5 TABLET ORAL at 18:30

## 2020-05-04 RX ADMIN — ACETAMINOPHEN 650 MG: 325 TABLET, FILM COATED ORAL at 08:02

## 2020-05-04 RX ADMIN — HYDROMORPHONE HYDROCHLORIDE 0.25 MG: 1 INJECTION, SOLUTION INTRAMUSCULAR; INTRAVENOUS; SUBCUTANEOUS at 08:02

## 2020-05-04 RX ADMIN — HYDROMORPHONE HYDROCHLORIDE 0.25 MG: 1 INJECTION, SOLUTION INTRAMUSCULAR; INTRAVENOUS; SUBCUTANEOUS at 01:02

## 2020-05-04 RX ADMIN — SODIUM CHLORIDE: 9 INJECTION, SOLUTION INTRAVENOUS at 19:07

## 2020-05-04 RX ADMIN — HYDROMORPHONE HYDROCHLORIDE 0.25 MG: 1 INJECTION, SOLUTION INTRAMUSCULAR; INTRAVENOUS; SUBCUTANEOUS at 04:05

## 2020-05-04 RX ADMIN — SODIUM CHLORIDE: 9 INJECTION, SOLUTION INTRAVENOUS at 01:14

## 2020-05-04 RX ADMIN — VANCOMYCIN HYDROCHLORIDE 1000 MG: 500 INJECTION, POWDER, LYOPHILIZED, FOR SOLUTION INTRAVENOUS at 13:09

## 2020-05-04 RX ADMIN — VANCOMYCIN HYDROCHLORIDE 1000 MG: 500 INJECTION, POWDER, LYOPHILIZED, FOR SOLUTION INTRAVENOUS at 20:22

## 2020-05-04 RX ADMIN — VANCOMYCIN HYDROCHLORIDE 1000 MG: 500 INJECTION, POWDER, LYOPHILIZED, FOR SOLUTION INTRAVENOUS at 01:03

## 2020-05-04 RX ADMIN — ACETAMINOPHEN 650 MG: 325 TABLET, FILM COATED ORAL at 18:30

## 2020-05-04 RX ADMIN — AMPICILLIN SODIUM AND SULBACTAM SODIUM 3 G: 2; 1 INJECTION, POWDER, FOR SOLUTION INTRAMUSCULAR; INTRAVENOUS at 00:13

## 2020-05-04 RX ADMIN — AMPICILLIN SODIUM AND SULBACTAM SODIUM 3 G: 2; 1 INJECTION, POWDER, FOR SOLUTION INTRAMUSCULAR; INTRAVENOUS at 05:57

## 2020-05-04 RX ADMIN — AMPICILLIN SODIUM AND SULBACTAM SODIUM 3 G: 2; 1 INJECTION, POWDER, FOR SOLUTION INTRAMUSCULAR; INTRAVENOUS at 18:31

## 2020-05-04 RX ADMIN — OXYCODONE HYDROCHLORIDE 5 MG: 5 TABLET ORAL at 18:31

## 2020-05-04 ASSESSMENT — ENCOUNTER SYMPTOMS
ABDOMINAL PAIN: 0
BLURRED VISION: 0
EYE PAIN: 0
WHEEZING: 0
PALPITATIONS: 0
COUGH: 0
WEIGHT LOSS: 0
SPEECH CHANGE: 0
DIARRHEA: 0
NAUSEA: 0
NERVOUS/ANXIOUS: 1
TREMORS: 0
NECK PAIN: 0
CHILLS: 0
DEPRESSION: 0
WEAKNESS: 0
CONSTIPATION: 0
FALLS: 0
SEIZURES: 0
ROS SKIN COMMENTS: FACIAL SWELLING AND PAIN
PND: 0
DIZZINESS: 0
SHORTNESS OF BREATH: 0
HEARTBURN: 0
VOMITING: 0
SPUTUM PRODUCTION: 0
HEADACHES: 0
FEVER: 0
BACK PAIN: 0

## 2020-05-04 ASSESSMENT — LIFESTYLE VARIABLES: SUBSTANCE_ABUSE: 1

## 2020-05-04 NOTE — PROGRESS NOTES
Received report from day shift. Assumed care of patient. VSS. Patient up to the bathroom with two assist. Will continue to monitor patient.

## 2020-05-04 NOTE — PROGRESS NOTES
"Pharmacy Kinetics 50 y.o. female on vancomycin day # 2 2020    Currently on Vancomycin 1000 mg iv q12hr    Indication for Treatment: Facial cellulitis and abscess    Pertinent history per medical record: Admitted on 5/3/2020 for lower chin swelling and pain.  PMH: HTN, substance abuse.    Other antibiotics: Unasyn 3 Gm IV every 6 hours. Thru 20    Allergies: Patient has no known allergies.     List concerns for renal function:  None at this time.  Pertinent cultures to date:   5/3/20 peripheral BCx2 NGTD    MRSA nares swab if pneumonia is a concern (ordered/positive/negative/n-a): N/A    Recent Labs     20  0930   WBC 14.4*   NEUTSPOLYS 71.80     Recent Labs     20  0930   BUN 10   CREATININE 0.68     Recent Labs     20  1201   VANCOTROUGH 7.3*       Intake/Output Summary (Last 24 hours) at 2020 1417  Last data filed at 2020 1306  Gross per 24 hour   Intake 400 ml   Output 1500 ml   Net -1100 ml      /75   Pulse 90   Temp 36.8 °C (98.2 °F) (Oral)   Resp 18   Ht 1.626 m (5' 4\")   Wt 61.6 kg (135 lb 12.9 oz)   SpO2 95%  Temp (24hrs), Av.7 °C (98.1 °F), Min:36.4 °C (97.5 °F), Max:37.1 °C (98.8 °F)      A/P   1. Vancomycin dose change: 1000 mg IV every 8 hours.  2. Next vancomycin level: tomorrow at 1230 hours.  3. Goal trough: 12-16 mcg/ml  4. Comments: Will monitor and adjust regimen per protocol.    Alex Silva formerly [facility]    "

## 2020-05-04 NOTE — CARE PLAN
Problem: Safety  Goal: Will remain free from injury  Outcome: PROGRESSING AS EXPECTED  Note: Educated patient on use of call light for assistance when ambulating.     Problem: Pain Management  Goal: Pain level will decrease to patient's comfort goal  Outcome: PROGRESSING AS EXPECTED  Intervention: Educate and implement non-pharmacologic comfort measures. Examples: relaxation, distration, play therapy, activity therapy, massage, etc.  Note: Provided pain medications as prescribed for 10/10 pain.     Problem: Psychosocial Needs:  Goal: Level of anxiety will decrease  Outcome: PROGRESSING AS EXPECTED  Intervention: Identify and develop with patient strategies to cope with anxiety triggers  Note: Provided therapeutic communication.

## 2020-05-04 NOTE — PROGRESS NOTES
[facility] Medicine Daily Progress Note    Date of Service  5/4/2020    Chief Complaint  50 y.o. female admitted 5/3/2020 with complaints of facial cellulitis and abscess    Hospital Course    Past medical history of homelessness and drug abuse presented with complaint of facial cellulitis and abscess.  Started on IV antibiotics and the patient received aspiration in the ER.      Interval Problem Update  5/4.  Patient still complains of significant pain Patient's pain is local 6-7/10, intermittent and does not radiate to other location, sharp and with some tingling. Can be controlled by pain meds. Dressing in place.  Patient otherwise denies fever, chills, nausea vomiting diarrhea.    Consultants/Specialty  none    Code Status  full    Disposition  tbd    Review of Systems  Review of Systems   Constitutional: Positive for malaise/fatigue. Negative for chills, fever and weight loss.   HENT: Negative for congestion, ear discharge, ear pain, hearing loss and nosebleeds.    Eyes: Negative for blurred vision and pain.   Respiratory: Negative for cough, sputum production, shortness of breath and wheezing.    Cardiovascular: Negative for chest pain, palpitations, leg swelling and PND.   Gastrointestinal: Negative for abdominal pain, constipation, diarrhea, heartburn, nausea and vomiting.   Genitourinary: Negative for dysuria, frequency and hematuria.   Musculoskeletal: Negative for back pain, falls, joint pain and neck pain.   Skin: Negative for rash.        Facial swelling and pain   Neurological: Negative for dizziness, tremors, speech change, seizures, weakness and headaches.   Psychiatric/Behavioral: Positive for substance abuse. Negative for depression and suicidal ideas. The patient is nervous/anxious.         Physical Exam  Temp:  [36.4 °C (97.5 °F)-37.1 °C (98.8 °F)] 36.8 °C (98.2 °F)  Pulse:  [90-98] 90  Resp:  [18-20] 18  BP: (134-158)/(75-90) 134/75  SpO2:  [93 %-99 %] 95 %    Physical Exam  Vitals signs and  nursing note reviewed.   Constitutional:       General: She is not in acute distress.     Appearance: Normal appearance. She is normal weight.   HENT:      Head: Normocephalic and atraumatic.      Right Ear: Tympanic membrane, ear canal and external ear normal.      Left Ear: Tympanic membrane, ear canal and external ear normal.      Nose: Nose normal.      Mouth/Throat:      Mouth: Mucous membranes are moist.      Pharynx: Oropharynx is clear. Posterior oropharyngeal erythema present.      Comments: Significant lower change tissue swelling.  Eyes:      Extraocular Movements: Extraocular movements intact.      Conjunctiva/sclera: Conjunctivae normal.      Pupils: Pupils are equal, round, and reactive to light.   Neck:      Musculoskeletal: Normal range of motion and neck supple. No neck rigidity.      Vascular: No carotid bruit.   Cardiovascular:      Rate and Rhythm: Normal rate and regular rhythm.      Pulses: Normal pulses.      Heart sounds: Normal heart sounds. No murmur. No friction rub. No gallop.    Pulmonary:      Effort: Pulmonary effort is normal. No respiratory distress.      Breath sounds: Normal breath sounds. No stridor. No wheezing, rhonchi or rales.   Chest:      Chest wall: No tenderness.   Abdominal:      General: Abdomen is flat. Bowel sounds are normal. There is no distension.      Palpations: Abdomen is soft. There is no mass.      Tenderness: There is no abdominal tenderness. There is no guarding or rebound.      Hernia: No hernia is present.   Musculoskeletal: Normal range of motion.         General: No swelling.   Lymphadenopathy:      Cervical: No cervical adenopathy.   Skin:     General: Skin is warm.      Capillary Refill: Capillary refill takes more than 3 seconds.      Coloration: Skin is not jaundiced or pale.   Neurological:      General: No focal deficit present.      Mental Status: She is alert and oriented to person, place, and time. Mental status is at baseline.   Psychiatric:          Mood and Affect: Mood normal.         Behavior: Behavior normal.         Fluids    Intake/Output Summary (Last 24 hours) at 5/4/2020 1130  Last data filed at 5/4/2020 0800  Gross per 24 hour   Intake 180 ml   Output 1500 ml   Net -1320 ml       Laboratory  Recent Labs     05/03/20  0930   WBC 14.4*   RBC 5.06   HEMOGLOBIN 15.0   HEMATOCRIT 45.7   MCV 90.3   MCH 29.6   MCHC 32.8*   RDW 43.3   PLATELETCT 352   MPV 9.1     Recent Labs     05/03/20  0930   SODIUM 139   POTASSIUM 4.1   CHLORIDE 98   CO2 26   GLUCOSE 117*   BUN 10   CREATININE 0.68   CALCIUM 9.6                   Imaging  CT-MAXILLOFACIAL WITH PLUS RECONS   Final Result      1.  There is a superficial subcutaneous abscess just anterior to the mandible in the midline with surrounding phlegmonous enhancement of the subcutaneous tissues.   2.  There are reactive level 1 and level 2 lymph nodes.   3.  There is no CT evidence of acute osteomyelitis.   4.  There are old left-sided facial fractures noted.           Assessment/Plan  * Facial abscess- (present on admission)  Assessment & Plan  CT confirmed a superficial facial abscess and cellulitis  ED performed I&D  Culture in place and pending  no signs of systemic sepsis  Start patient on IV antibiotics Unasyn and vancomycin  Following culture result, currently no growth to date.    Tobacco dependence- (present on admission)  Assessment & Plan  - Smoking cessation education provided  - Nicotine patch      Hypertension, essential- (present on admission)  Assessment & Plan  No known history of medication  Possibly due to pain  PRN Vasotec ordered    Leukocytosis- (present on admission)  Assessment & Plan  Likely from facial abscess and cellulitis  Start antibiotics see above  Patient refused lab test today.  Recommend lab test to be collected.         VTE prophylaxis: SCD and lovenox      Current Facility-Administered Medications:   •  acetaminophen (TYLENOL) tablet 650 mg, 650 mg, Oral, Q6HRS PRN, Kinyarwanda  MICA Gillespie, 650 mg at 05/04/20 0802  •  senna-docusate (PERICOLACE or SENOKOT S) 8.6-50 MG per tablet 2 Tab, 2 Tab, Oral, BID, 2 Tab at 05/03/20 1214 **AND** polyethylene glycol/lytes (MIRALAX) PACKET 1 Packet, 1 Packet, Oral, QDAY PRN **AND** magnesium hydroxide (MILK OF MAGNESIA) suspension 30 mL, 30 mL, Oral, QDAY PRN **AND** bisacodyl (DULCOLAX) suppository 10 mg, 10 mg, Rectal, QDAY PRN, Maria Ines Gilelspie M.D.  •  enoxaparin (LOVENOX) inj 40 mg, 40 mg, Subcutaneous, DAILY, Maria Ines Gillespie M.D.  •  Notify provider if pain remains uncontrolled, , , CONTINUOUS **AND** Use the numeric rating scale (NRS-11) on regular floors and Critical-Care Pain Observation Tool (CPOT) on ICUs/Trauma to assess pain, , , CONTINUOUS **AND** Pulse Ox (Oximetry), , , CONTINUOUS **AND** Pharmacy Consult Request ...Pain Management Review 1 Each, 1 Each, Other, PHARMACY TO DOSE **AND** If patient difficult to arouse and/or has respiratory depression, stop any opiates that are currently infusing and call a Rapid Response., , , CONTINUOUS **AND** oxyCODONE immediate-release (ROXICODONE) tablet 2.5 mg, 2.5 mg, Oral, Q3HRS PRN **AND** oxyCODONE immediate-release (ROXICODONE) tablet 5 mg, 5 mg, Oral, Q3HRS PRN, 5 mg at 05/03/20 1214 **AND** HYDROmorphone pf (DILAUDID) injection 0.25 mg, 0.25 mg, Intravenous, Q3HRS PRN, Maria Ines Gillespie M.D., 0.25 mg at 05/04/20 0802  •  enalaprilat (VASOTEC) injection 1.25 mg, 1.25 mg, Intravenous, Q6HRS PRN, Maria Ines Gillespie M.D.  •  ondansetron (ZOFRAN) syringe/vial injection 4 mg, 4 mg, Intravenous, Q4HRS PRN, Maria Ines Gillespie M.D.  •  ondansetron (ZOFRAN ODT) dispertab 4 mg, 4 mg, Oral, Q4HRS PRN, Maria Ines Gillespie M.D.  •  promethazine (PHENERGAN) tablet 12.5-25 mg, 12.5-25 mg, Oral, Q4HRS PRN, Maria Ines Gillespie M.D.  •  promethazine (PHENERGAN) suppository 12.5-25 mg, 12.5-25 mg, Rectal, Q4HRS PRN, Maria Ines Gillespie M.D.  •  prochlorperazine (COMPAZINE) injection 5-10 mg, 5-10 mg, Intravenous, Q4HRS PRN, Maria Ines Gillespie M.D.  •  nicotine (NICODERM) 14 MG/24HR 14 mg,  14 mg, Transdermal, Daily-0600, 14 mg at 05/04/20 0557 **AND** Nicotine Replacement Patient Education Materials, , , Once **AND** nicotine polacrilex (NICORETTE) 2 MG piece 2 mg, 2 mg, Oral, Q HOUR PRN, Maria Ines Gillespie M.D.  •  ampicillin/sulbactam (UNASYN) 3 g in  mL IVPB, 3 g, Intravenous, Q6HRS, Maria Ines Gillespie M.D., Stopped at 05/04/20 0627  •  MD Alert...Vancomycin per Pharmacy, , Other, PHARMACY TO DOSE, Maria Ines Gillespie M.D.  •  NS infusion, , Intravenous, Continuous, Maria Ines Gillespie M.D., Last Rate: 75 mL/hr at 05/04/20 0114  •  vancomycin (VANCOCIN) 1,000 mg in  mL IVPB, 15 mg/kg, Intravenous, Q12HR, Maria Ines Gillespie M.D., Stopped at 05/04/20 030

## 2020-05-04 NOTE — CARE PLAN
Problem: Infection  Goal: Will remain free from infection  Outcome: PROGRESSING AS EXPECTED     Problem: Knowledge Deficit  Goal: Knowledge of disease process/condition, treatment plan, diagnostic tests, and medications will improve  Outcome: PROGRESSING AS EXPECTED  Goal: Knowledge of the prescribed therapeutic regimen will improve  Outcome: PROGRESSING AS EXPECTED     Problem: Pain Management  Goal: Pain level will decrease to patient's comfort goal  Outcome: PROGRESSING AS EXPECTED     Patient responding well to IV ABX. Patient is able to understand and verbalizes need for IV ABX to decrease the inflammation/infection on her face.  Pain was well controlled with IV Dilaudid 1mg and oral tylenol.       Problem: Safety  Goal: Will remain free from injury  Outcome: MET  Goal: Will remain free from falls  Outcome: MET     No injuries or falls this shift. Will continue to monitor.

## 2020-05-04 NOTE — PROGRESS NOTES
Pt refused lab draw. RN educated pt on importance of compliance. RN requested lab to attempt redraw at later time.

## 2020-05-04 NOTE — RESPIRATORY CARE
COPD EDUCATION by COPD CLINICAL EDUCATOR  5/4/2020 at 12:56 PM by Sosa Marroquin, RRT     Patient interviewed by COPD education team. Patient refused COPD/Smoking Cessation program and information at this time.

## 2020-05-05 VITALS
HEART RATE: 73 BPM | TEMPERATURE: 97.4 F | WEIGHT: 135.8 LBS | RESPIRATION RATE: 18 BRPM | DIASTOLIC BLOOD PRESSURE: 80 MMHG | SYSTOLIC BLOOD PRESSURE: 136 MMHG | HEIGHT: 64 IN | OXYGEN SATURATION: 98 % | BODY MASS INDEX: 23.18 KG/M2

## 2020-05-05 PROBLEM — Z59.00 HOMELESSNESS: Status: ACTIVE | Noted: 2020-05-05

## 2020-05-05 LAB
ANION GAP SERPL CALC-SCNC: 9 MMOL/L (ref 7–16)
BASOPHILS # BLD AUTO: 0.4 % (ref 0–1.8)
BASOPHILS # BLD: 0.03 K/UL (ref 0–0.12)
BUN SERPL-MCNC: 8 MG/DL (ref 8–22)
CALCIUM SERPL-MCNC: 8.7 MG/DL (ref 8.4–10.2)
CHLORIDE SERPL-SCNC: 100 MMOL/L (ref 96–112)
CO2 SERPL-SCNC: 28 MMOL/L (ref 20–33)
CREAT SERPL-MCNC: 0.61 MG/DL (ref 0.5–1.4)
EOSINOPHIL # BLD AUTO: 0.29 K/UL (ref 0–0.51)
EOSINOPHIL NFR BLD: 3.5 % (ref 0–6.9)
ERYTHROCYTE [DISTWIDTH] IN BLOOD BY AUTOMATED COUNT: 42.8 FL (ref 35.9–50)
GLUCOSE SERPL-MCNC: 111 MG/DL (ref 65–99)
HCT VFR BLD AUTO: 39.5 % (ref 37–47)
HGB BLD-MCNC: 12.7 G/DL (ref 12–16)
IMM GRANULOCYTES # BLD AUTO: 0.02 K/UL (ref 0–0.11)
IMM GRANULOCYTES NFR BLD AUTO: 0.2 % (ref 0–0.9)
LYMPHOCYTES # BLD AUTO: 3.14 K/UL (ref 1–4.8)
LYMPHOCYTES NFR BLD: 37.9 % (ref 22–41)
MCH RBC QN AUTO: 29.4 PG (ref 27–33)
MCHC RBC AUTO-ENTMCNC: 32.2 G/DL (ref 33.6–35)
MCV RBC AUTO: 91.4 FL (ref 81.4–97.8)
MONOCYTES # BLD AUTO: 0.61 K/UL (ref 0–0.85)
MONOCYTES NFR BLD AUTO: 7.4 % (ref 0–13.4)
NEUTROPHILS # BLD AUTO: 4.2 K/UL (ref 2–7.15)
NEUTROPHILS NFR BLD: 50.6 % (ref 44–72)
NRBC # BLD AUTO: 0 K/UL
NRBC BLD-RTO: 0 /100 WBC
PLATELET # BLD AUTO: 303 K/UL (ref 164–446)
PMV BLD AUTO: 9.1 FL (ref 9–12.9)
POTASSIUM SERPL-SCNC: 4.1 MMOL/L (ref 3.6–5.5)
RBC # BLD AUTO: 4.32 M/UL (ref 4.2–5.4)
SODIUM SERPL-SCNC: 137 MMOL/L (ref 135–145)
WBC # BLD AUTO: 8.3 K/UL (ref 4.8–10.8)

## 2020-05-05 PROCEDURE — A9270 NON-COVERED ITEM OR SERVICE: HCPCS | Performed by: INTERNAL MEDICINE

## 2020-05-05 PROCEDURE — 99239 HOSP IP/OBS DSCHRG MGMT >30: CPT | Performed by: INTERNAL MEDICINE

## 2020-05-05 PROCEDURE — 36415 COLL VENOUS BLD VENIPUNCTURE: CPT

## 2020-05-05 PROCEDURE — 80048 BASIC METABOLIC PNL TOTAL CA: CPT

## 2020-05-05 PROCEDURE — 700105 HCHG RX REV CODE 258: Performed by: INTERNAL MEDICINE

## 2020-05-05 PROCEDURE — 85025 COMPLETE CBC W/AUTO DIFF WBC: CPT

## 2020-05-05 PROCEDURE — 700102 HCHG RX REV CODE 250 W/ 637 OVERRIDE(OP): Performed by: INTERNAL MEDICINE

## 2020-05-05 PROCEDURE — 700111 HCHG RX REV CODE 636 W/ 250 OVERRIDE (IP): Performed by: INTERNAL MEDICINE

## 2020-05-05 RX ORDER — DOXYCYCLINE 100 MG/1
100 TABLET ORAL EVERY 12 HOURS
Status: DISCONTINUED | OUTPATIENT
Start: 2020-05-05 | End: 2020-05-05 | Stop reason: HOSPADM

## 2020-05-05 RX ADMIN — AMPICILLIN SODIUM AND SULBACTAM SODIUM 3 G: 2; 1 INJECTION, POWDER, FOR SOLUTION INTRAMUSCULAR; INTRAVENOUS at 00:01

## 2020-05-05 RX ADMIN — SENNOSIDES AND DOCUSATE SODIUM 2 TABLET: 8.6; 5 TABLET ORAL at 05:08

## 2020-05-05 RX ADMIN — VANCOMYCIN HYDROCHLORIDE 1000 MG: 500 INJECTION, POWDER, LYOPHILIZED, FOR SOLUTION INTRAVENOUS at 05:47

## 2020-05-05 RX ADMIN — AMPICILLIN SODIUM AND SULBACTAM SODIUM 3 G: 2; 1 INJECTION, POWDER, FOR SOLUTION INTRAMUSCULAR; INTRAVENOUS at 05:08

## 2020-05-05 RX ADMIN — OXYCODONE HYDROCHLORIDE 5 MG: 5 TABLET ORAL at 04:36

## 2020-05-05 RX ADMIN — ACETAMINOPHEN 650 MG: 325 TABLET, FILM COATED ORAL at 08:33

## 2020-05-05 ASSESSMENT — ENCOUNTER SYMPTOMS
FALLS: 0
SPUTUM PRODUCTION: 0
BLURRED VISION: 0
VOMITING: 0
DEPRESSION: 0
BACK PAIN: 0
FEVER: 0
TREMORS: 0
SEIZURES: 0
ABDOMINAL PAIN: 0
NERVOUS/ANXIOUS: 1
HEADACHES: 0
CONSTIPATION: 0
PALPITATIONS: 0
SPEECH CHANGE: 0
SHORTNESS OF BREATH: 0
EYE PAIN: 0
WEIGHT LOSS: 0
DIZZINESS: 0
HEARTBURN: 0
CHILLS: 0
NAUSEA: 0
DIARRHEA: 0
NECK PAIN: 0
WHEEZING: 0
COUGH: 0
PND: 0
WEAKNESS: 0

## 2020-05-05 ASSESSMENT — LIFESTYLE VARIABLES: SUBSTANCE_ABUSE: 1

## 2020-05-05 NOTE — PROGRESS NOTES
Patient states she must leave hospital at this time secondary to no one caring about her dogs as they will die if she doesn't leave at this time. Physician notified of patient wishes to leave and patient to leave hospital against medical advice.

## 2020-05-05 NOTE — PROGRESS NOTES
[facility] Medicine Daily Progress Note    Date of Service  5/5/2020    Chief Complaint  50 y.o. female admitted 5/3/2020 with complaints of facial cellulitis and abscess    Hospital Course    Past medical history of homelessness and drug abuse presented with complaint of facial cellulitis and abscess.  Started on IV antibiotics and the patient received aspiration in the ER.      Interval Problem Update  5/4.  Patient still complains of significant pain and swelling  5/5: some venous drainage at the base of her inner lip.  WBC normalized    Consultants/Specialty  None- I discussed with Dr. Rodriguez, surgery, however due to location this would be better addressed by specialist if drainage is needed.  Currently at [facility] there is no available on-call ENT or oral surgeon and no immediate/urgent need for drainage as she is having improvement of her white count and edema.    Code Status  full    Disposition  If drainage needed, transfer to [facility] for oral surgery consultation    Review of Systems  Review of Systems   Constitutional: Positive for malaise/fatigue. Negative for chills, fever and weight loss.   HENT: Negative for congestion, ear discharge, ear pain, hearing loss and nosebleeds.         Lower lip/chin painful and swollen.  Drainage in inner lip   Eyes: Negative for blurred vision and pain.   Respiratory: Negative for cough, sputum production, shortness of breath and wheezing.    Cardiovascular: Negative for chest pain, palpitations, leg swelling and PND.   Gastrointestinal: Negative for abdominal pain, constipation, diarrhea, heartburn, nausea and vomiting.   Genitourinary: Negative for dysuria, frequency and hematuria.   Musculoskeletal: Negative for back pain, falls, joint pain and neck pain.   Skin: Negative for rash.   Neurological: Negative for dizziness, tremors, speech change, seizures, weakness and headaches.   Psychiatric/Behavioral: Positive for substance abuse. Negative for depression and  suicidal ideas. The patient is nervous/anxious.         Physical Exam  Temp:  [36.3 °C (97.4 °F)-37 °C (98.6 °F)] 36.3 °C (97.4 °F)  Pulse:  [70-78] 73  Resp:  [18] 18  BP: (136-175)/() 136/80  SpO2:  [96 %-98 %] 98 %    Physical Exam  Vitals signs and nursing note reviewed.   Constitutional:       General: She is not in acute distress.     Appearance: She is normal weight.      Comments: Disheveled, unkempt   HENT:      Head: Normocephalic and atraumatic.      Nose: Nose normal.      Mouth/Throat:      Mouth: Mucous membranes are dry.      Pharynx: Posterior oropharyngeal erythema present.      Comments: Significant lower change tissue swelling.  Scant purulent drainage on inner lower lip. poor dentition  Eyes:      Extraocular Movements: Extraocular movements intact.      Conjunctiva/sclera: Conjunctivae normal.      Pupils: Pupils are equal, round, and reactive to light.   Neck:      Musculoskeletal: Normal range of motion and neck supple. No neck rigidity.      Vascular: No carotid bruit.   Cardiovascular:      Rate and Rhythm: Normal rate and regular rhythm.      Pulses: Normal pulses.      Heart sounds: Normal heart sounds. No murmur. No friction rub. No gallop.    Pulmonary:      Effort: Pulmonary effort is normal. No respiratory distress.      Breath sounds: Normal breath sounds. No stridor. No wheezing, rhonchi or rales.   Chest:      Chest wall: No tenderness.   Abdominal:      General: Abdomen is flat. Bowel sounds are normal. There is no distension.      Palpations: Abdomen is soft. There is no mass.      Tenderness: There is no abdominal tenderness. There is no guarding or rebound.      Hernia: No hernia is present.   Musculoskeletal: Normal range of motion.         General: Signs of injury (Right ankle in cat) present. No swelling.   Lymphadenopathy:      Cervical: No cervical adenopathy.   Skin:     General: Skin is warm.      Capillary Refill: Capillary refill takes more than 3 seconds.       Coloration: Skin is not jaundiced or pale.   Neurological:      General: No focal deficit present.      Mental Status: She is alert and oriented to person, place, and time. Mental status is at baseline.   Psychiatric:         Mood and Affect: Mood normal.         Behavior: Behavior normal.         Fluids    Intake/Output Summary (Last 24 hours) at 5/5/2020 1154  Last data filed at 5/5/2020 0742  Gross per 24 hour   Intake 3250.41 ml   Output 800 ml   Net 2450.41 ml       Laboratory  Recent Labs     05/03/20  0930 05/05/20  0440   WBC 14.4* 8.3   RBC 5.06 4.32   HEMOGLOBIN 15.0 12.7   HEMATOCRIT 45.7 39.5   MCV 90.3 91.4   MCH 29.6 29.4   MCHC 32.8* 32.2*   RDW 43.3 42.8   PLATELETCT 352 303   MPV 9.1 9.1     Recent Labs     05/03/20  0930 05/05/20  0440   SODIUM 139 137   POTASSIUM 4.1 4.1   CHLORIDE 98 100   CO2 26 28   GLUCOSE 117* 111*   BUN 10 8   CREATININE 0.68 0.61   CALCIUM 9.6 8.7                   Imaging  CT-MAXILLOFACIAL WITH PLUS RECONS   Final Result      1.  There is a superficial subcutaneous abscess just anterior to the mandible in the midline with surrounding phlegmonous enhancement of the subcutaneous tissues.   2.  There are reactive level 1 and level 2 lymph nodes.   3.  There is no CT evidence of acute osteomyelitis.   4.  There are old left-sided facial fractures noted.           Assessment/Plan  * Facial abscess- (present on admission)  Assessment & Plan  CT confirmed a superficial facial abscess and cellulitis  ED performed I&D  Culture pending  WBC improving  Add warm compress QID to facilitate spontaneous drainage  Continue IV Unasyn and vancomycin    Homelessness  Assessment & Plan  Poor hygiene overall which is contributing to skin infection/abscess    Tobacco dependence- (present on admission)  Assessment & Plan  - Smoking cessation education provided  - Nicotine patch    Hypertension, essential- (present on admission)  Assessment & Plan  Improved    Leukocytosis- (present on  admission)  Assessment & Plan  Improved       VTE prophylaxis: SCD and lovenox      Current Facility-Administered Medications:   •  vancomycin (VANCOCIN) 1,000 mg in  mL IVPB, 1,000 mg, Intravenous, Q8HR, Maria Ines Gillespie M.D., Stopped at 05/05/20 0747  •  acetaminophen (TYLENOL) tablet 650 mg, 650 mg, Oral, Q6HRS PRN, Maria Ines Gillespie M.D., 650 mg at 05/05/20 0833  •  senna-docusate (PERICOLACE or SENOKOT S) 8.6-50 MG per tablet 2 Tab, 2 Tab, Oral, BID, 2 Tab at 05/05/20 0508 **AND** polyethylene glycol/lytes (MIRALAX) PACKET 1 Packet, 1 Packet, Oral, QDAY PRN **AND** magnesium hydroxide (MILK OF MAGNESIA) suspension 30 mL, 30 mL, Oral, QDAY PRN **AND** bisacodyl (DULCOLAX) suppository 10 mg, 10 mg, Rectal, QDAY PRN, Maria Ines Gillespie M.D.  •  enoxaparin (LOVENOX) inj 40 mg, 40 mg, Subcutaneous, DAILY, Maria Ines Gillespie M.D.  •  Notify provider if pain remains uncontrolled, , , CONTINUOUS **AND** Use the numeric rating scale (NRS-11) on regular floors and Critical-Care Pain Observation Tool (CPOT) on ICUs/Trauma to assess pain, , , CONTINUOUS **AND** Pulse Ox (Oximetry), , , CONTINUOUS **AND** Pharmacy Consult Request ...Pain Management Review 1 Each, 1 Each, Other, PHARMACY TO DOSE **AND** If patient difficult to arouse and/or has respiratory depression, stop any opiates that are currently infusing and call a Rapid Response., , , CONTINUOUS **AND** oxyCODONE immediate-release (ROXICODONE) tablet 2.5 mg, 2.5 mg, Oral, Q3HRS PRN **AND** oxyCODONE immediate-release (ROXICODONE) tablet 5 mg, 5 mg, Oral, Q3HRS PRN, 5 mg at 05/05/20 0436 **AND** HYDROmorphone pf (DILAUDID) injection 0.25 mg, 0.25 mg, Intravenous, Q3HRS PRN, Maria Ines Gillespie M.D., 0.25 mg at 05/04/20 0802  •  enalaprilat (VASOTEC) injection 1.25 mg, 1.25 mg, Intravenous, Q6HRS PRN, Maria Ines Gillespie M.D.  •  ondansetron (ZOFRAN) syringe/vial injection 4 mg, 4 mg, Intravenous, Q4HRS PRN, Maria Ines Gillespie M.D.  •  ondansetron (ZOFRAN ODT) dispertab 4 mg, 4 mg, Oral, Q4HRS PRN, Maria Ines Gillespie M.D.  •   promethazine (PHENERGAN) tablet 12.5-25 mg, 12.5-25 mg, Oral, Q4HRS PRN, Maria Ines Gillespie M.D.  •  promethazine (PHENERGAN) suppository 12.5-25 mg, 12.5-25 mg, Rectal, Q4HRS PRN, Maria Ines Gillespie M.D.  •  prochlorperazine (COMPAZINE) injection 5-10 mg, 5-10 mg, Intravenous, Q4HRS PRN, Maria Ines Gillespie M.D.  •  nicotine (NICODERM) 14 MG/24HR 14 mg, 14 mg, Transdermal, Daily-0600, 14 mg at 05/04/20 0557 **AND** Nicotine Replacement Patient Education Materials, , , Once **AND** nicotine polacrilex (NICORETTE) 2 MG piece 2 mg, 2 mg, Oral, Q HOUR PRN, Maria Ines Gillespie M.D.  •  ampicillin/sulbactam (UNASYN) 3 g in  mL IVPB, 3 g, Intravenous, Q6HRS, Maria Ines Gillespie M.D., Stopped at 05/05/20 0538  •  MD Alert...Vancomycin per Pharmacy, , Other, PHARMACY TO DOSE, Maria Ines Gillespie M.D.

## 2020-05-05 NOTE — PROGRESS NOTES
"Notified by lab that patient refused vanco trough. When attempting to educate patient and encourage her to allow lab to draw her trough patient became anxious and emotional. Patient stated \"I just want to be left alone.\"    Discussed with charge nurse who also attempted to discuss with patient but she continues to refuse.     Patient also refused 12:00 ampicillin antibiotic.     Will reattempt at 3pm for lab draw and antibiotic.    Comfort measures offered. Charge nurse states she will contact social work to possibly get them involved regarding patients stress over her dogs.     Will continue to offer comfort and emotional support and monitor.   "
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
## 2020-05-05 NOTE — CARE PLAN
Problem: Communication  Goal: The ability to communicate needs accurately and effectively will improve  Outcome: PROGRESSING AS EXPECTED  Intervention: Educate patient and significant other/support system about the plan of care, procedures, treatments, medications and allow for questions  Note: Patient verbalized concerns of her dogs with her boyfriend. Patient is concerned of there safety and was wanting to leave AMA. Discussed with patient the outcomes of her leaving and the risks of worsening infection. Patient verbalized understanding. Offered comfort as she was anxious and crying. Patient appeared to calm down and relax in bed. Discussed plan of care for today and tomorrow.     Problem: Infection  Goal: Will remain free from infection  Outcome: PROGRESSING AS EXPECTED  Intervention: Implement standard precautions and perform hand washing before and after patient contact  Note: Educated patient on importance of hand hygiene and importance of keeping her hands away from her mouth to prevent further infection. Patient verbalized understanding. Provided suction equipment to help with the abscess drainage to attempt to keep the area as clean as possible. Will continue to monitor.     Problem: Bowel/Gastric:  Goal: Normal bowel function is maintained or improved  Outcome: PROGRESSING AS EXPECTED  Goal: Will not experience complications related to bowel motility  Outcome: PROGRESSING AS EXPECTED     Problem: Knowledge Deficit  Goal: Knowledge of disease process/condition, treatment plan, diagnostic tests, and medications will improve  Outcome: PROGRESSING AS EXPECTED  Goal: Knowledge of the prescribed therapeutic regimen will improve  Outcome: PROGRESSING AS EXPECTED     Problem: Discharge Barriers/Planning  Goal: Patient's continuum of care needs will be met  Outcome: PROGRESSING AS EXPECTED

## 2020-05-05 NOTE — CARE PLAN
Received report from day shift nurse.   Assumed pt care at approximately 1900.  Pt is A&Ox4, resting comfortably in bed.   Pt on r.a.. No signs of SOB/respiratory distress.   Labs noted, VSS.   Pt c/o 4/10 pain at this moment.   Assessment completed, bottom lip is red and swollen, cast and boot in place on right leg CMS is intact. Pt is continuing her prescribed treatment of antibiotics.   Fall precautions in place.   Bed at lowest position.   Call light and personal belongings within reach. Continue to monitor         Problem: Communication  Goal: The ability to communicate needs accurately and effectively will improve  Outcome: PROGRESSING AS EXPECTED     Problem: Infection  Goal: Will remain free from infection  Outcome: PROGRESSING AS EXPECTED

## 2020-05-05 NOTE — DISCHARGE SUMMARY
Discharge Summary    CHIEF COMPLAINT ON ADMISSION  Chief Complaint   Patient presents with   • Oral Swelling   • Dental Pain       Reason for Admission  Dental Pain     Admission Date  5/3/2020    CODE STATUS  Full Code    HPI & HOSPITAL COURSE  This is a 50 y.o. female Past medical history of homelessness and drug abuse presented with complaint of facial cellulitis and abscess.  Started on IV antibiotics and the patient received aspiration in the ER.  The following day, her white count had improved however she was still having some purulent drainage at her abscess site.  She was tolerating IV Unasyn and initially agreed to stay another night however, changed her mind and decided leave the hospital AGAINST MEDICAL ADVICE.  She was thoroughly evaluated on the day of discharge and extensive conversation was held with her to recommend staying for complete treatment however she stated she understood the risks and elected to leave AMA    Therefore, she is discharged in guarded and stable condition against medcial advice.    The patient recovered much more quickly than anticipated on admission.    Discharge Date  5/5/2020    FOLLOW UP ITEMS POST DISCHARGE  She was unable to get follow-up information she left AGAINST MEDICAL ADVICE    DISCHARGE DIAGNOSES  Principal Problem:    Facial abscess POA: Yes  Active Problems:    Leukocytosis POA: Yes    Hypertension, essential POA: Yes    Tobacco dependence POA: Yes    Homelessness POA: Unknown  Resolved Problems:    * No resolved hospital problems. *      FOLLOW UP  None scheduled, left AMA    MEDICATIONS ON DISCHARGE     Medication List      You have not been prescribed any medications.         Allergies  No Known Allergies    DIET  Orders Placed This Encounter   Procedures   • Diet Order Regular     Standing Status:   Standing     Number of Occurrences:   1     Order Specific Question:   Diet:     Answer:   Regular [1]     Order Specific Question:   Texture Modifier     Answer:    Level 6 - Soft & Bite Sized (Dysphagia 3)     Order Specific Question:   Liquid level     Answer:   Level 0 - Thin       ACTIVITY  As tolerated.  Weight bearing as tolerated    CONSULTATIONS  None    PROCEDURES  None    LABORATORY  Lab Results   Component Value Date    SODIUM 137 05/05/2020    POTASSIUM 4.1 05/05/2020    CHLORIDE 100 05/05/2020    CO2 28 05/05/2020    GLUCOSE 111 (H) 05/05/2020    BUN 8 05/05/2020    CREATININE 0.61 05/05/2020        Lab Results   Component Value Date    WBC 8.3 05/05/2020    HEMOGLOBIN 12.7 05/05/2020    HEMATOCRIT 39.5 05/05/2020    PLATELETCT 303 05/05/2020        Total time of the discharge process exceeds 38 minutes.

## 2020-05-05 NOTE — PROGRESS NOTES
Bedside shift report given to oncoming RN. Patient AOx4, sitting in bed. IV fluids running at this time. Pain well controlled. All questions answered.

## 2020-05-05 NOTE — PROGRESS NOTES
Pt stating that she had given day shift  dollar bill to bring to her boyfriend waiting in the ER, but the boyfriend states he did not receive the money. Confirmed with day shift charge nurse that the money was brought down to the security guards by the day shift unit clerk. Pt is checking with boyfriend again to see if he received the money.     2030: Pt's boyfriend states he has received the money.

## 2020-06-11 ENCOUNTER — HOSPITAL ENCOUNTER (EMERGENCY)
Dept: HOSPITAL 8 - ED | Age: 50
Discharge: HOME | End: 2020-06-11
Payer: MEDICAID

## 2020-06-11 VITALS — WEIGHT: 141.54 LBS | BODY MASS INDEX: 24.16 KG/M2 | HEIGHT: 64 IN

## 2020-06-11 VITALS — SYSTOLIC BLOOD PRESSURE: 123 MMHG | DIASTOLIC BLOOD PRESSURE: 80 MMHG

## 2020-06-11 DIAGNOSIS — L02.11: Primary | ICD-10-CM

## 2020-06-11 DIAGNOSIS — F17.200: ICD-10-CM

## 2020-06-11 PROCEDURE — 10060 I&D ABSCESS SIMPLE/SINGLE: CPT

## 2020-06-11 PROCEDURE — 99283 EMERGENCY DEPT VISIT LOW MDM: CPT

## 2020-06-11 NOTE — NUR
AFTER I&D OF ABCESS NECK FLUSHED WITH SALINE AND LARGE BAND-AID PLACED. 
PROVIDED RESOURCE TO GET ASSISTANCE WITH MED COST AND TAXI VOUCHER. PT GIVEN 
DISHARGE INSTRUCTIONS INCLUDING TO RETURN FOR RECHECK IN 48 HOURS

## 2020-09-26 ENCOUNTER — HOSPITAL ENCOUNTER (EMERGENCY)
Facility: MEDICAL CENTER | Age: 50
End: 2020-09-26
Attending: EMERGENCY MEDICINE
Payer: MEDICAID

## 2020-09-26 VITALS
TEMPERATURE: 98 F | OXYGEN SATURATION: 95 % | DIASTOLIC BLOOD PRESSURE: 76 MMHG | RESPIRATION RATE: 16 BRPM | HEIGHT: 64 IN | HEART RATE: 91 BPM | WEIGHT: 127.21 LBS | SYSTOLIC BLOOD PRESSURE: 120 MMHG | BODY MASS INDEX: 21.72 KG/M2

## 2020-09-26 DIAGNOSIS — L03.312 CELLULITIS OF BACK EXCEPT BUTTOCK: ICD-10-CM

## 2020-09-26 PROCEDURE — 700102 HCHG RX REV CODE 250 W/ 637 OVERRIDE(OP): Performed by: EMERGENCY MEDICINE

## 2020-09-26 PROCEDURE — 99283 EMERGENCY DEPT VISIT LOW MDM: CPT

## 2020-09-26 PROCEDURE — A9270 NON-COVERED ITEM OR SERVICE: HCPCS | Performed by: EMERGENCY MEDICINE

## 2020-09-26 RX ORDER — AMOXICILLIN 250 MG/1
500 CAPSULE ORAL ONCE
Status: COMPLETED | OUTPATIENT
Start: 2020-09-26 | End: 2020-09-26

## 2020-09-26 RX ORDER — IBUPROFEN 600 MG/1
600 TABLET ORAL ONCE
Status: COMPLETED | OUTPATIENT
Start: 2020-09-26 | End: 2020-09-26

## 2020-09-26 RX ORDER — SULFAMETHOXAZOLE AND TRIMETHOPRIM 800; 160 MG/1; MG/1
1 TABLET ORAL ONCE
Status: COMPLETED | OUTPATIENT
Start: 2020-09-26 | End: 2020-09-26

## 2020-09-26 RX ORDER — AMOXICILLIN 500 MG/1
500 CAPSULE ORAL 3 TIMES DAILY
Qty: 15 CAP | Refills: 0 | Status: SHIPPED | OUTPATIENT
Start: 2020-09-26 | End: 2020-10-01

## 2020-09-26 RX ORDER — OXYCODONE HYDROCHLORIDE 5 MG/1
5 TABLET ORAL ONCE
Status: DISCONTINUED | OUTPATIENT
Start: 2020-09-26 | End: 2020-09-26 | Stop reason: HOSPADM

## 2020-09-26 RX ORDER — SULFAMETHOXAZOLE AND TRIMETHOPRIM 800; 160 MG/1; MG/1
1 TABLET ORAL 2 TIMES DAILY
Qty: 10 TAB | Refills: 0 | Status: SHIPPED | OUTPATIENT
Start: 2020-09-26 | End: 2020-10-01

## 2020-09-26 RX ORDER — SULFAMETHOXAZOLE AND TRIMETHOPRIM 800; 160 MG/1; MG/1
1 TABLET ORAL 2 TIMES DAILY
Qty: 10 TAB | Refills: 0 | Status: SHIPPED | OUTPATIENT
Start: 2020-09-26 | End: 2020-09-26 | Stop reason: SDUPTHER

## 2020-09-26 RX ORDER — AMOXICILLIN 500 MG/1
500 CAPSULE ORAL 3 TIMES DAILY
Qty: 15 CAP | Refills: 0 | Status: SHIPPED | OUTPATIENT
Start: 2020-09-26 | End: 2020-09-26 | Stop reason: SDUPTHER

## 2020-09-26 RX ORDER — IBUPROFEN 600 MG/1
600 TABLET ORAL EVERY 6 HOURS PRN
Qty: 30 TAB | Refills: 0 | Status: SHIPPED | OUTPATIENT
Start: 2020-09-26

## 2020-09-26 RX ORDER — IBUPROFEN 600 MG/1
600 TABLET ORAL EVERY 6 HOURS PRN
Qty: 30 TAB | Refills: 0 | Status: SHIPPED | OUTPATIENT
Start: 2020-09-26 | End: 2020-09-26 | Stop reason: SDUPTHER

## 2020-09-26 RX ADMIN — IBUPROFEN 600 MG: 600 TABLET ORAL at 16:51

## 2020-09-26 RX ADMIN — AMOXICILLIN 500 MG: 250 CAPSULE ORAL at 16:51

## 2020-09-26 RX ADMIN — SULFAMETHOXAZOLE AND TRIMETHOPRIM 1 TABLET: 800; 160 TABLET ORAL at 16:52

## 2020-09-26 ASSESSMENT — FIBROSIS 4 INDEX: FIB4 SCORE: 0.57

## 2020-09-26 NOTE — ED PROVIDER NOTES
"ED Provider Note    CHIEF COMPLAINT   Chief Complaint   Patient presents with   • Abscess         HPI   Kaela Rhodes is a 50 y.o. female who presents to the ED with 2 large abscesses on her back.  She says that the pain is sharp severe has been there for approximately a week, subjective fevers, states that it hurts \"throughout all my body\".  Patient is tearful, states that she is homeless, finally got some money for gas to drive her RV here.    REVIEW OF SYSTEMS   See HPI for further details. All other systems are negative.     PAST MEDICAL HISTORY   Past Medical History:   Diagnosis Date   • Hypertension        FAMILY HISTORY  History reviewed. No pertinent family history.    SOCIAL HISTORY  Social History     Socioeconomic History   • Marital status:      Spouse name: Not on file   • Number of children: Not on file   • Years of education: Not on file   • Highest education level: Not on file   Occupational History   • Not on file   Social Needs   • Financial resource strain: Not on file   • Food insecurity     Worry: Not on file     Inability: Not on file   • Transportation needs     Medical: Not on file     Non-medical: Not on file   Tobacco Use   • Smoking status: Current Every Day Smoker     Packs/day: 0.20     Types: Cigarettes   • Smokeless tobacco: Never Used   Substance and Sexual Activity   • Alcohol use: Not Currently     Comment: occasional   • Drug use: No   • Sexual activity: Not on file   Lifestyle   • Physical activity     Days per week: Not on file     Minutes per session: Not on file   • Stress: Not on file   Relationships   • Social connections     Talks on phone: Not on file     Gets together: Not on file     Attends Gnosticism service: Not on file     Active member of club or organization: Not on file     Attends meetings of clubs or organizations: Not on file     Relationship status: Not on file   • Intimate partner violence     Fear of current or ex partner: Not on file     " "Emotionally abused: Not on file     Physically abused: Not on file     Forced sexual activity: Not on file   Other Topics Concern   • Not on file   Social History Narrative   • Not on file        SURGICAL HISTORY  Past Surgical History:   Procedure Laterality Date   • OTHER ORTHOPEDIC SURGERY      rt knee miniscus  removal x2 , bite repair rt ankle       CURRENT MEDICATIONS   Home Medications     Reviewed by Amalia Soriano R.N. (Registered Nurse) on 09/26/20 at 1535  Med List Status: Partial   Medication Last Dose Status        Patient Colt Taking any Medications                       ALLERGIES   No Known Allergies    PHYSICAL EXAM  VITAL SIGNS: /76   Pulse 91   Temp 36.7 °C (98 °F)   Resp 16   Ht 1.626 m (5' 4\")   Wt 57.7 kg (127 lb 3.3 oz)   LMP 03/25/2017 (Exact Date)   SpO2 95%   BMI 21.83 kg/m²   Constitutional: Well developed, Well nourished, moderate distress, Non-toxic appearance.   HENT: Atraumatic, Normocephalic, Oral pharynx with moist mucous membranes.  Eyes: EOMI, PERRL  Cardiovascular: Good pulses  Thorax & Lungs: No respiratory distress  Skin: Patient has a fairly large approximately 7 cm in diameter area of erythema and injury patient with a central eschar, there is no palpable fluid, she has a similar area of erythema and induration superior to the neck towards the left trapezius that is tender but does not feel like it has any fluid, bedside ultrasound performed did not show any fluid collections underneath these lesions on her back.  Extremities: No gross deformity seen  Neuro: Awake, alert, moved all extremities spontaneously and purposefully  Psych: Moaning screaming      COURSE & MEDICAL DECISION MAKING  Pertinent Labs & Imaging studies reviewed. (See chart for details)  Patient with cellulitis of her back, it appears that these areas have Javier drained, I will put the patient on antibiotics, give the patient a pain medicine here, she states she did not have money to get her " educations filled, discussed the case with  apparently the patient has Medicaid so that will pay for her prescriptions, the patient will take Tylenol ibuprofen, have the patient return with worsening symptoms.        FINAL IMPRESSION  1. Cellulitis of back except buttock    .    Patient referred to primary care provider for blood pressure management    This dictation was created using voice recognition software. The accuracy of the dictation is limited to the abilities of the software. I expect there may be some errors of grammar and possibly content. The nursing notes were reviewed and certain aspects of this information were incorporated into this note.    Electronically signed by: Gopal Gibson M.D., 9/26/2020 4:30 PM

## 2020-09-26 NOTE — ED TRIAGE NOTES
2 Abscesses on upper back for a little over a week. Subjective fever at home.    Denies covid symptoms or known exposure.

## 2020-09-26 NOTE — ED NOTES
Patient crying when wheeled into [facility]. Large abscess to middle of upper back with black center and red surrounding it. Another smaller abscess to upper back also.

## 2020-09-27 NOTE — ED NOTES
Patient discharged to home with instructions and prescriptions sent to [facility] per patient request. Patient is currently living in her motor home and states she has to drive herself.

## 2020-09-27 NOTE — ED NOTES
Patient states she doesn't have any money for medication.  at [facility] called and patient does have medicaid. Told patient to go to [facility] because she has medicaid. Patient given resources for food bank because she states she doesn't have any more food. Patient given food while she was here and meds given to patient.

## 2020-09-29 ENCOUNTER — HOSPITAL ENCOUNTER (EMERGENCY)
Facility: MEDICAL CENTER | Age: 50
End: 2020-09-29
Attending: EMERGENCY MEDICINE
Payer: MEDICAID

## 2020-09-29 VITALS
OXYGEN SATURATION: 96 % | BODY MASS INDEX: 21.38 KG/M2 | DIASTOLIC BLOOD PRESSURE: 79 MMHG | RESPIRATION RATE: 18 BRPM | HEIGHT: 64 IN | TEMPERATURE: 98.8 F | SYSTOLIC BLOOD PRESSURE: 126 MMHG | WEIGHT: 125.22 LBS | HEART RATE: 103 BPM

## 2020-09-29 PROCEDURE — 99281 EMR DPT VST MAYX REQ PHY/QHP: CPT

## 2020-09-29 RX ORDER — MORPHINE SULFATE 4 MG/ML
4 INJECTION, SOLUTION INTRAMUSCULAR; INTRAVENOUS ONCE
Status: COMPLETED | OUTPATIENT
Start: 2020-09-29 | End: 2020-09-29

## 2020-09-29 RX ORDER — ONDANSETRON 2 MG/ML
4 INJECTION INTRAMUSCULAR; INTRAVENOUS ONCE
Status: COMPLETED | OUTPATIENT
Start: 2020-09-29 | End: 2020-09-29

## 2020-09-29 RX ORDER — LIDOCAINE HYDROCHLORIDE 10 MG/ML
20 INJECTION, SOLUTION INFILTRATION; PERINEURAL ONCE
Status: COMPLETED | OUTPATIENT
Start: 2020-09-29 | End: 2020-09-29

## 2020-09-29 ASSESSMENT — FIBROSIS 4 INDEX: FIB4 SCORE: 0.57

## 2020-09-30 NOTE — ED NOTES
Rx ordered by ERP (4mg of Zofran, 4mg of Morphine and 20 mL of Lidocaine) - ALL 3-returned to [facility];     3g of Unasyn & 100 mL NS wasted;    CHELSEA Lowe notified;

## 2020-09-30 NOTE — ED TRIAGE NOTES
"Chief Complaint   Patient presents with   • Abscess     present on upper back, pt was here recently for same complaint. Pt c/o \"extreme pain\" radiating from abscess site. Pt was unable to get abx filled due to not having any money.      /79   Pulse (!) 103   Temp 37.1 °C (98.8 °F) (Temporal)   Resp 18   Ht 1.626 m (5' 4\")   Wt 56.8 kg (125 lb 3.5 oz)   LMP 03/25/2017 (Exact Date)   SpO2 96%   BMI 21.49 kg/m²     Pt arrived w/ above concern. Pt states she has been lying on the floor since she last left this facility due to the pain. Pt also states she can \"taste the infection\". There is a bandage in place over the abscess from last visit.     COVID screen negative. Mask in place.   "

## 2020-09-30 NOTE — ED PROVIDER NOTES
"ED Provider Note  CHIEF COMPLAINT  Chief Complaint   Patient presents with   • Abscess     present on upper back, pt was here recently for same complaint. Pt c/o \"extreme pain\" radiating from abscess site. Pt was unable to get abx filled due to not having any money.        HPI  Kaela Rhodes is a 50 y.o. female who presents with tender swollen area on the patient's back. This started several days ago. There is associated redness. There is significant pain. Pain is described as throbbing.  Patient states the pain radiates down her back.  She states she was unable to fill her antibiotics due to insurance problems.  She states the pain has gotten significantly worse where she is can only lay in her trailer for the last 2 days.  Patient is extremely irritable and she is very frustrated with answering any questions.  She does look uncomfortable and therefore I did not ask further questions at this point as I thought getting her pain controlled was more important.  She was extremely frustrated with her previous care here in the emergency department and spent most of her time saying how she wanted to get an  and ana lilia everybody who took care of her last time.  No history of diabetes.  Denies drug abuse.    REVIEW OF SYSTEMS  See HPI    PAST MEDICAL HISTORY  Past Medical History:   Diagnosis Date   • Hypertension        SOCIAL HISTORY  Social History     Socioeconomic History   • Marital status:      Spouse name: Not on file   • Number of children: Not on file   • Years of education: Not on file   • Highest education level: Not on file   Occupational History   • Not on file   Social Needs   • Financial resource strain: Not on file   • Food insecurity     Worry: Not on file     Inability: Not on file   • Transportation needs     Medical: Not on file     Non-medical: Not on file   Tobacco Use   • Smoking status: Current Every Day Smoker     Packs/day: 0.20     Types: Cigarettes   • Smokeless tobacco: " "Never Used   Substance and Sexual Activity   • Alcohol use: Not Currently     Comment: occasional   • Drug use: No   • Sexual activity: Not on file   Lifestyle   • Physical activity     Days per week: Not on file     Minutes per session: Not on file   • Stress: Not on file   Relationships   • Social connections     Talks on phone: Not on file     Gets together: Not on file     Attends Worship service: Not on file     Active member of club or organization: Not on file     Attends meetings of clubs or organizations: Not on file     Relationship status: Not on file   • Intimate partner violence     Fear of current or ex partner: Not on file     Emotionally abused: Not on file     Physically abused: Not on file     Forced sexual activity: Not on file   Other Topics Concern   • Not on file   Social History Narrative   • Not on file       SURGICAL HISTORY  Past Surgical History:   Procedure Laterality Date   • OTHER ORTHOPEDIC SURGERY      rt knee miniscus  removal x2 , bite repair rt ankle       CURRENT MEDICATIONS  Home Medications    **Home medications have not yet been reviewed for this encounter**           ALLERGIES  No Known Allergies    PHYSICAL EXAM  VITAL SIGNS: /79   Pulse (!) 103   Temp 37.1 °C (98.8 °F) (Temporal)   Resp 18   Ht 1.626 m (5' 4\")   Wt 56.8 kg (125 lb 3.5 oz)   LMP 03/25/2017 (Exact Date)   SpO2 96%   BMI 21.49 kg/m²   Constitutional: Well developed, Well nourished, moderate acute distress.  In pain  HENT:  Atraumatic, Normocephalic  Eyes: sclera white, No discharge.   Neck: Normal range of motion  Lymphatic: No lymphadenopathy noted.   Cardiovascular: Tachycardic  Thorax & Lungs: No respiratory distress  Skin: Large abscess in the upper back over the spine.  The abscess is about 7 cm x 7 cm.  There is a central Escher that is open with purulent drainage.  She is too tender for me to palpate to try to express further drainage.  There is also a smaller abscess to her upper back " towards her left trapezius that appears to be healing.  It is not fluctuant.  It is about 1 x 2 cm.  Psychiatric: Very upset and angry    COURSE & MEDICAL DECISION MAKING    I wrote for the patient to receive an IV dose of antibiotics as well as IV pain medication.  I advised her that I was going to try to numb the area and see if I could incise and drain further fluid from that area.  Also advised her that she needed antibiotics.  She was very upset that she was having difficulty filling her antibiotics and felt that we needed to do more to ensure that she got antibiotics at discharge.  I reviewed the old chart and she was seen by social work who stated she had Medicaid and could fill her antibiotics.  However she insists this is not the case and she cannot get any antibiotics.  I told her at this point we need to further evaluate her labs before we make any final disposition plans.    I was told by nursing that when they went in to start the IV she became angry and upset again.  Nursing tried to comfort her saying that we had IV pain medication to help her feel better as well as antibiotics.  However she became upset and walked out.  I was not notified that she had left until she had already left the emergency department.  She was alert and oriented when I initially saw her.  She was not intoxicated.  Think she was capable to make decisions and understand the risk of leaving.    FINAL IMPRESSION  1. subcutaneous abscess  2.  AMA    Disposition: Home    Please note that this dictation was created using voice recognition software. I have worked with consultants from the vendor as well as technical experts from [facility] to optimize the interface. I have made every reasonable attempt to correct obvious errors, but I expect that there are errors of grammar and possibly content that I did not discover before finalizing the note.      Electronically signed by: Feli Brink M.D., 9/29/2020 6:41 PM

## 2020-09-30 NOTE — ED NOTES
"Pt walked out of ER without signing AMA - stated \"I'm tired of waiting, everyone is being rude to me, and you won't give me any Pn Rx when I ask for them\" - even though Pt was informed that when we establish a IV the ERP ordered some Pn Rx; ILIA Brink is aware;      "

## 2021-02-12 NOTE — PROGRESS NOTES
12 hour chart check     Sarecycline Pregnancy And Lactation Text: This medication is Pregnancy Category D and not consider safe during pregnancy. It is also excreted in breast milk. Isotretinoin Pregnancy And Lactation Text: This medication is Pregnancy Category X and is considered extremely dangerous during pregnancy. It is unknown if it is excreted in breast milk. Tazorac Counseling:  Patient advised that medication is irritating and drying.  Patient may need to apply sparingly and wash off after an hour before eventually leaving it on overnight.  The patient verbalized understanding of the proper use and possible adverse effects of tazorac.  All of the patient's questions and concerns were addressed. Tetracycline Counseling: Patient counseled regarding possible photosensitivity and increased risk for sunburn.  Patient instructed to avoid sunlight, if possible.  When exposed to sunlight, patients should wear protective clothing, sunglasses, and sunscreen.  The patient was instructed to call the office immediately if the following severe adverse effects occur:  hearing changes, easy bruising/bleeding, severe headache, or vision changes.  The patient verbalized understanding of the proper use and possible adverse effects of tetracycline.  All of the patient's questions and concerns were addressed. Patient understands to avoid pregnancy while on therapy due to potential birth defects. Use Enhanced Medication Counseling?: No Topical Sulfur Applications Pregnancy And Lactation Text: This medication is Pregnancy Category C and has an unknown safety profile during pregnancy. It is unknown if this topical medication is excreted in breast milk. Benzoyl Peroxide Pregnancy And Lactation Text: This medication is Pregnancy Category C. It is unknown if benzoyl peroxide is excreted in breast milk. Benzoyl Peroxide Counseling: Patient counseled that medicine may cause skin irritation and bleach clothing.  In the event of skin irritation, the patient was advised to reduce the amount of the drug applied or use it less frequently.   The patient verbalized understanding of the proper use and possible adverse effects of benzoyl peroxide.  All of the patient's questions and concerns were addressed. Topical Clindamycin Pregnancy And Lactation Text: This medication is Pregnancy Category B and is considered safe during pregnancy. It is unknown if it is excreted in breast milk. Azithromycin Counseling:  I discussed with the patient the risks of azithromycin including but not limited to GI upset, allergic reaction, drug rash, diarrhea, and yeast infections. Birth Control Pills Pregnancy And Lactation Text: This medication should be avoided if pregnant and for the first 30 days post-partum. Topical Retinoid counseling:  Patient advised to apply a pea-sized amount only at bedtime and wait 30 minutes after washing their face before applying.  If too drying, patient may add a non-comedogenic moisturizer. The patient verbalized understanding of the proper use and possible adverse effects of retinoids.  All of the patient's questions and concerns were addressed. Detail Level: Zone Sarecycline Counseling: Patient advised regarding possible photosensitivity and discoloration of the teeth, skin, lips, tongue and gums.  Patient instructed to avoid sunlight, if possible.  When exposed to sunlight, patients should wear protective clothing, sunglasses, and sunscreen.  The patient was instructed to call the office immediately if the following severe adverse effects occur:  hearing changes, easy bruising/bleeding, severe headache, or vision changes.  The patient verbalized understanding of the proper use and possible adverse effects of sarecycline.  All of the patient's questions and concerns were addressed. Bactrim Counseling:  I discussed with the patient the risks of sulfa antibiotics including but not limited to GI upset, allergic reaction, drug rash, diarrhea, dizziness, photosensitivity, and yeast infections.  Rarely, more serious reactions can occur including but not limited to aplastic anemia, agranulocytosis, methemoglobinemia, blood dyscrasias, liver or kidney failure, lung infiltrates or desquamative/blistering drug rashes. Topical Sulfur Applications Counseling: Topical Sulfur Counseling: Patient counseled that this medication may cause skin irritation or allergic reactions.  In the event of skin irritation, the patient was advised to reduce the amount of the drug applied or use it less frequently.   The patient verbalized understanding of the proper use and possible adverse effects of topical sulfur application.  All of the patient's questions and concerns were addressed. Doxycycline Counseling:  Patient counseled regarding possible photosensitivity and increased risk for sunburn.  Patient instructed to avoid sunlight, if possible.  When exposed to sunlight, patients should wear protective clothing, sunglasses, and sunscreen.  The patient was instructed to call the office immediately if the following severe adverse effects occur:  hearing changes, easy bruising/bleeding, severe headache, or vision changes.  The patient verbalized understanding of the proper use and possible adverse effects of doxycycline.  All of the patient's questions and concerns were addressed. Tazorac Pregnancy And Lactation Text: This medication is not safe during pregnancy. It is unknown if this medication is excreted in breast milk. Erythromycin Pregnancy And Lactation Text: This medication is Pregnancy Category B and is considered safe during pregnancy. It is also excreted in breast milk. High Dose Vitamin A Counseling: Side effects reviewed, pt to contact office should one occur. Birth Control Pills Counseling: Birth Control Pill Counseling: I discussed with the patient the potential side effects of OCPs including but not limited to increased risk of stroke, heart attack, thrombophlebitis, deep venous thrombosis, hepatic adenomas, breast changes, GI upset, headaches, and depression.  The patient verbalized understanding of the proper use and possible adverse effects of OCPs. All of the patient's questions and concerns were addressed. Bactrim Pregnancy And Lactation Text: This medication is Pregnancy Category D and is known to cause fetal risk.  It is also excreted in breast milk. Dapsone Pregnancy And Lactation Text: This medication is Pregnancy Category C and is not considered safe during pregnancy or breast feeding. Erythromycin Counseling:  I discussed with the patient the risks of erythromycin including but not limited to GI upset, allergic reaction, drug rash, diarrhea, increase in liver enzymes, and yeast infections. Azithromycin Pregnancy And Lactation Text: This medication is considered safe during pregnancy and is also secreted in breast milk. Spironolactone Counseling: Patient advised regarding risks of diarrhea, abdominal pain, hyperkalemia, birth defects (for female patients), liver toxicity and renal toxicity. The patient may need blood work to monitor liver and kidney function and potassium levels while on therapy. The patient verbalized understanding of the proper use and possible adverse effects of spironolactone.  All of the patient's questions and concerns were addressed. Minocycline Counseling: Patient advised regarding possible photosensitivity and discoloration of the teeth, skin, lips, tongue and gums.  Patient instructed to avoid sunlight, if possible.  When exposed to sunlight, patients should wear protective clothing, sunglasses, and sunscreen.  The patient was instructed to call the office immediately if the following severe adverse effects occur:  hearing changes, easy bruising/bleeding, severe headache, or vision changes.  The patient verbalized understanding of the proper use and possible adverse effects of minocycline.  All of the patient's questions and concerns were addressed. Isotretinoin Counseling: Patient should get monthly blood tests, not donate blood, not drive at night if vision affected, not share medication, and not undergo elective surgery for 6 months after tx completed. Side effects reviewed, pt to contact office should one occur. Spironolactone Pregnancy And Lactation Text: This medication can cause feminization of the male fetus and should be avoided during pregnancy. The active metabolite is also found in breast milk. Dapsone Counseling: I discussed with the patient the risks of dapsone including but not limited to hemolytic anemia, agranulocytosis, rashes, methemoglobinemia, kidney failure, peripheral neuropathy, headaches, GI upset, and liver toxicity.  Patients who start dapsone require monitoring including baseline LFTs and weekly CBCs for the first month, then every month thereafter.  The patient verbalized understanding of the proper use and possible adverse effects of dapsone.  All of the patient's questions and concerns were addressed. Doxycycline Pregnancy And Lactation Text: This medication is Pregnancy Category D and not consider safe during pregnancy. It is also excreted in breast milk but is considered safe for shorter treatment courses. Topical Retinoid Pregnancy And Lactation Text: This medication is Pregnancy Category C. It is unknown if this medication is excreted in breast milk. High Dose Vitamin A Pregnancy And Lactation Text: High dose vitamin A therapy is contraindicated during pregnancy and breast feeding. Topical Clindamycin Counseling: Patient counseled that this medication may cause skin irritation or allergic reactions.  In the event of skin irritation, the patient was advised to reduce the amount of the drug applied or use it less frequently.   The patient verbalized understanding of the proper use and possible adverse effects of clindamycin.  All of the patient's questions and concerns were addressed.

## 2024-02-19 NOTE — RESPIRATORY CARE
COPD EDUCATION by COPD CLINICAL EDUCATOR  6/20/2019 at 9:30 AM by Sosa Marroquin     Patient interviewed by COPD education team. Patient refused COPD/Smoking cessation program at this time. Patient states has other bigger issues than smoking.  
No.